# Patient Record
Sex: MALE | Race: WHITE | URBAN - METROPOLITAN AREA
[De-identification: names, ages, dates, MRNs, and addresses within clinical notes are randomized per-mention and may not be internally consistent; named-entity substitution may affect disease eponyms.]

---

## 2017-08-07 ENCOUNTER — OUTPATIENT (OUTPATIENT)
Dept: OUTPATIENT SERVICES | Facility: HOSPITAL | Age: 67
LOS: 1 days | End: 2017-08-07

## 2017-12-09 ENCOUNTER — OUTPATIENT (OUTPATIENT)
Dept: OUTPATIENT SERVICES | Facility: HOSPITAL | Age: 67
LOS: 1 days | End: 2017-12-09

## 2017-12-09 ENCOUNTER — INPATIENT (INPATIENT)
Facility: HOSPITAL | Age: 67
LOS: 2 days | Discharge: NURSING, REHAB OR LONG-TERM CARE | End: 2017-12-12
Admitting: SURGERY
Payer: MEDICARE

## 2017-12-09 PROCEDURE — 99285 EMERGENCY DEPT VISIT HI MDM: CPT

## 2017-12-09 PROCEDURE — 73562 X-RAY EXAM OF KNEE 3: CPT | Mod: 26,RT

## 2017-12-09 PROCEDURE — 73552 X-RAY EXAM OF FEMUR 2/>: CPT | Mod: 26,RT

## 2017-12-09 PROCEDURE — 71260 CT THORAX DX C+: CPT | Mod: 26

## 2017-12-09 PROCEDURE — 93880 EXTRACRANIAL BILAT STUDY: CPT | Mod: 26

## 2017-12-09 PROCEDURE — 74177 CT ABD & PELVIS W/CONTRAST: CPT | Mod: 26

## 2017-12-09 PROCEDURE — 72125 CT NECK SPINE W/O DYE: CPT | Mod: 26

## 2017-12-09 PROCEDURE — 70450 CT HEAD/BRAIN W/O DYE: CPT | Mod: 26

## 2017-12-09 PROCEDURE — 73502 X-RAY EXAM HIP UNI 2-3 VIEWS: CPT | Mod: 26,RT

## 2017-12-10 ENCOUNTER — OUTPATIENT (OUTPATIENT)
Dept: OUTPATIENT SERVICES | Facility: HOSPITAL | Age: 67
LOS: 1 days | End: 2017-12-10

## 2017-12-10 PROCEDURE — 73502 X-RAY EXAM HIP UNI 2-3 VIEWS: CPT | Mod: 26,RT

## 2017-12-11 ENCOUNTER — OUTPATIENT (OUTPATIENT)
Dept: OUTPATIENT SERVICES | Facility: HOSPITAL | Age: 67
LOS: 1 days | End: 2017-12-11

## 2017-12-11 PROCEDURE — 71010: CPT | Mod: 26

## 2017-12-12 ENCOUNTER — OUTPATIENT (OUTPATIENT)
Dept: OUTPATIENT SERVICES | Facility: HOSPITAL | Age: 67
LOS: 1 days | End: 2017-12-12

## 2017-12-12 ENCOUNTER — INPATIENT (INPATIENT)
Facility: HOSPITAL | Age: 67
LOS: 52 days | Discharge: HOME | End: 2018-02-03
Payer: COMMERCIAL

## 2018-01-02 PROCEDURE — 73502 X-RAY EXAM HIP UNI 2-3 VIEWS: CPT | Mod: 26,RT

## 2023-02-15 ENCOUNTER — APPOINTMENT (OUTPATIENT)
Dept: RADIOLOGY | Facility: CLINIC | Age: 73
End: 2023-02-15
Payer: MEDICARE

## 2023-02-15 PROCEDURE — 71046 X-RAY EXAM CHEST 2 VIEWS: CPT

## 2025-01-20 ENCOUNTER — APPOINTMENT (OUTPATIENT)
Dept: CT IMAGING | Facility: CLINIC | Age: 75
End: 2025-01-20
Payer: MEDICARE

## 2025-01-20 PROCEDURE — 74176 CT ABD & PELVIS W/O CONTRAST: CPT

## 2025-04-14 ENCOUNTER — TRANSCRIPTION ENCOUNTER (OUTPATIENT)
Age: 75
End: 2025-04-14

## 2025-04-25 ENCOUNTER — TRANSCRIPTION ENCOUNTER (OUTPATIENT)
Age: 75
End: 2025-04-25

## 2025-04-25 ENCOUNTER — INPATIENT (INPATIENT)
Facility: HOSPITAL | Age: 75
LOS: 2 days | Discharge: HOME | DRG: 696 | End: 2025-04-28
Attending: STUDENT IN AN ORGANIZED HEALTH CARE EDUCATION/TRAINING PROGRAM | Admitting: STUDENT IN AN ORGANIZED HEALTH CARE EDUCATION/TRAINING PROGRAM
Payer: MEDICARE

## 2025-04-25 VITALS
SYSTOLIC BLOOD PRESSURE: 171 MMHG | RESPIRATION RATE: 18 BRPM | TEMPERATURE: 97 F | HEART RATE: 88 BPM | DIASTOLIC BLOOD PRESSURE: 101 MMHG | OXYGEN SATURATION: 98 %

## 2025-04-25 DIAGNOSIS — R31.9 HEMATURIA, UNSPECIFIED: ICD-10-CM

## 2025-04-25 PROBLEM — Z00.00 ENCOUNTER FOR PREVENTIVE HEALTH EXAMINATION: Status: ACTIVE | Noted: 2025-04-25

## 2025-04-25 LAB
GLUCOSE BLDC GLUCOMTR-MCNC: 130 MG/DL — HIGH (ref 70–99)
GLUCOSE BLDC GLUCOMTR-MCNC: 133 MG/DL — HIGH (ref 70–99)
GLUCOSE BLDC GLUCOMTR-MCNC: 138 MG/DL — HIGH (ref 70–99)

## 2025-04-25 PROCEDURE — 52001 CYSTO W/IRRG&EVAC MLT CLOTS: CPT | Mod: 59

## 2025-04-25 PROCEDURE — 99235 HOSP IP/OBS SAME DATE MOD 70: CPT | Mod: FS,25

## 2025-04-25 PROCEDURE — 99222 1ST HOSP IP/OBS MODERATE 55: CPT

## 2025-04-25 PROCEDURE — 52214 CYSTOSCOPY AND TREATMENT: CPT

## 2025-04-25 DEVICE — GUIDEWIRE AMPLATZ SUPER-STIFF .035" X 145CM 7CM BENTSON-TYPE: Type: IMPLANTABLE DEVICE | Status: FUNCTIONAL

## 2025-04-25 RX ORDER — PSYLLIUM SEED (WITH DEXTROSE)
1 POWDER (GRAM) ORAL AT BEDTIME
Refills: 0 | Status: DISCONTINUED | OUTPATIENT
Start: 2025-04-25 | End: 2025-04-28

## 2025-04-25 RX ORDER — ONDANSETRON HCL/PF 4 MG/2 ML
4 VIAL (ML) INJECTION ONCE
Refills: 0 | Status: DISCONTINUED | OUTPATIENT
Start: 2025-04-25 | End: 2025-04-26

## 2025-04-25 RX ORDER — ATORVASTATIN CALCIUM 80 MG/1
20 TABLET, FILM COATED ORAL AT BEDTIME
Refills: 0 | Status: DISCONTINUED | OUTPATIENT
Start: 2025-04-25 | End: 2025-04-26

## 2025-04-25 RX ORDER — CARVEDILOL 3.12 MG/1
25 TABLET, FILM COATED ORAL EVERY 12 HOURS
Refills: 0 | Status: DISCONTINUED | OUTPATIENT
Start: 2025-04-25 | End: 2025-04-26

## 2025-04-25 RX ORDER — FENTANYL CITRATE-0.9 % NACL/PF 100MCG/2ML
25 SYRINGE (ML) INTRAVENOUS
Refills: 0 | Status: DISCONTINUED | OUTPATIENT
Start: 2025-04-25 | End: 2025-04-26

## 2025-04-25 RX ORDER — FINASTERIDE 1 MG/1
5 TABLET, FILM COATED ORAL DAILY
Refills: 0 | Status: DISCONTINUED | OUTPATIENT
Start: 2025-04-25 | End: 2025-04-28

## 2025-04-25 RX ORDER — METFORMIN HYDROCHLORIDE 850 MG/1
500 TABLET ORAL DAILY
Refills: 0 | Status: DISCONTINUED | OUTPATIENT
Start: 2025-04-25 | End: 2025-04-26

## 2025-04-25 RX ORDER — LISINOPRIL 5 MG/1
10 TABLET ORAL AT BEDTIME
Refills: 0 | Status: DISCONTINUED | OUTPATIENT
Start: 2025-04-25 | End: 2025-04-26

## 2025-04-25 RX ORDER — HYDROMORPHONE/SOD CHLOR,ISO/PF 2 MG/10 ML
0.5 SYRINGE (ML) INJECTION
Refills: 0 | Status: DISCONTINUED | OUTPATIENT
Start: 2025-04-25 | End: 2025-04-26

## 2025-04-25 RX ORDER — LEVOTHYROXINE SODIUM 300 MCG
50 TABLET ORAL DAILY
Refills: 0 | Status: DISCONTINUED | OUTPATIENT
Start: 2025-04-25 | End: 2025-04-28

## 2025-04-25 RX ADMIN — Medication 2.5 MILLIGRAM(S): at 23:23

## 2025-04-25 RX ADMIN — Medication 2.5 MILLIGRAM(S): at 22:30

## 2025-04-25 RX ADMIN — CARVEDILOL 25 MILLIGRAM(S): 3.12 TABLET, FILM COATED ORAL at 23:53

## 2025-04-25 RX ADMIN — LISINOPRIL 10 MILLIGRAM(S): 5 TABLET ORAL at 23:53

## 2025-04-25 NOTE — H&P ADULT - ASSESSMENT
Patient is a 72 yo male with extensive medical history transferred from [facility] for emergent clot evacuation due to clot retention.     Plan:   - NPO/IVF  - OR ASAP for clot evacuation   - medicine adm post-op

## 2025-04-25 NOTE — H&P ADULT - NSICDXPASTMEDICALHX_GEN_ALL_CORE_FT
PAST MEDICAL HISTORY:  CAD (coronary artery disease)     CVA (cerebrovascular accident)     DM (diabetes mellitus)     HLD (hyperlipidemia)     HTN (hypertension)

## 2025-04-25 NOTE — BRIEF OPERATIVE NOTE - OPERATION/FINDINGS
Active bleeding from prostate and bladder cauterized. Clot evacuated. 20F Home catheter placed w 60ml in balloon. CBI clear on traction.

## 2025-04-26 LAB
ACETONE SERPL-MCNC: ABNORMAL
ALBUMIN SERPL ELPH-MCNC: 3 G/DL — LOW (ref 3.3–5.2)
ALBUMIN SERPL ELPH-MCNC: 3.1 G/DL — LOW (ref 3.3–5.2)
ALP SERPL-CCNC: 85 U/L — SIGNIFICANT CHANGE UP (ref 40–120)
ALT FLD-CCNC: 7 U/L — SIGNIFICANT CHANGE UP
ANION GAP SERPL CALC-SCNC: 21 MMOL/L — HIGH (ref 5–17)
ANION GAP SERPL CALC-SCNC: 21 MMOL/L — HIGH (ref 5–17)
ANION GAP SERPL CALC-SCNC: 23 MMOL/L — HIGH (ref 5–17)
APPEARANCE UR: ABNORMAL
AST SERPL-CCNC: 8 U/L — SIGNIFICANT CHANGE UP
B-OH-BUTYR SERPL-SCNC: 5.1 MMOL/L — HIGH
BACTERIA # UR AUTO: ABNORMAL /HPF
BASOPHILS # BLD AUTO: 0.01 K/UL — SIGNIFICANT CHANGE UP (ref 0–0.2)
BASOPHILS NFR BLD AUTO: 0.1 % — SIGNIFICANT CHANGE UP (ref 0–2)
BILIRUB SERPL-MCNC: 0.2 MG/DL — LOW (ref 0.4–2)
BILIRUB UR-MCNC: NEGATIVE — SIGNIFICANT CHANGE UP
BUN SERPL-MCNC: 21.6 MG/DL — HIGH (ref 8–20)
BUN SERPL-MCNC: 24.8 MG/DL — HIGH (ref 8–20)
BUN SERPL-MCNC: 32.3 MG/DL — HIGH (ref 8–20)
CALCIUM SERPL-MCNC: 8.3 MG/DL — LOW (ref 8.4–10.5)
CALCIUM SERPL-MCNC: 8.5 MG/DL — SIGNIFICANT CHANGE UP (ref 8.4–10.5)
CALCIUM SERPL-MCNC: 8.6 MG/DL — SIGNIFICANT CHANGE UP (ref 8.4–10.5)
CAST: 4 /LPF — SIGNIFICANT CHANGE UP (ref 0–4)
CHLORIDE SERPL-SCNC: 101 MMOL/L — SIGNIFICANT CHANGE UP (ref 96–108)
CHLORIDE SERPL-SCNC: 101 MMOL/L — SIGNIFICANT CHANGE UP (ref 96–108)
CHLORIDE SERPL-SCNC: 99 MMOL/L — SIGNIFICANT CHANGE UP (ref 96–108)
CO2 SERPL-SCNC: 15 MMOL/L — LOW (ref 22–29)
CO2 SERPL-SCNC: 16 MMOL/L — LOW (ref 22–29)
CO2 SERPL-SCNC: 16 MMOL/L — LOW (ref 22–29)
COLOR SPEC: YELLOW — SIGNIFICANT CHANGE UP
CREAT SERPL-MCNC: 1 MG/DL — SIGNIFICANT CHANGE UP (ref 0.5–1.3)
CREAT SERPL-MCNC: 1.04 MG/DL — SIGNIFICANT CHANGE UP (ref 0.5–1.3)
CREAT SERPL-MCNC: 1.26 MG/DL — SIGNIFICANT CHANGE UP (ref 0.5–1.3)
DIFF PNL FLD: ABNORMAL
EGFR: 59 ML/MIN/1.73M2 — LOW
EGFR: 59 ML/MIN/1.73M2 — LOW
EGFR: 75 ML/MIN/1.73M2 — SIGNIFICANT CHANGE UP
EGFR: 75 ML/MIN/1.73M2 — SIGNIFICANT CHANGE UP
EGFR: 78 ML/MIN/1.73M2 — SIGNIFICANT CHANGE UP
EGFR: 78 ML/MIN/1.73M2 — SIGNIFICANT CHANGE UP
EOSINOPHIL # BLD AUTO: 0 K/UL — SIGNIFICANT CHANGE UP (ref 0–0.5)
EOSINOPHIL NFR BLD AUTO: 0 % — SIGNIFICANT CHANGE UP (ref 0–6)
GAS PNL BLDA: SIGNIFICANT CHANGE UP
GLUCOSE BLDC GLUCOMTR-MCNC: 196 MG/DL — HIGH (ref 70–99)
GLUCOSE BLDC GLUCOMTR-MCNC: 200 MG/DL — HIGH (ref 70–99)
GLUCOSE BLDC GLUCOMTR-MCNC: 240 MG/DL — HIGH (ref 70–99)
GLUCOSE BLDC GLUCOMTR-MCNC: 288 MG/DL — HIGH (ref 70–99)
GLUCOSE BLDC GLUCOMTR-MCNC: 299 MG/DL — HIGH (ref 70–99)
GLUCOSE BLDC GLUCOMTR-MCNC: 302 MG/DL — HIGH (ref 70–99)
GLUCOSE SERPL-MCNC: 196 MG/DL — HIGH (ref 70–99)
GLUCOSE SERPL-MCNC: 211 MG/DL — HIGH (ref 70–99)
GLUCOSE SERPL-MCNC: 299 MG/DL — HIGH (ref 70–99)
GLUCOSE UR QL: >=1000 MG/DL
HCT VFR BLD CALC: 41.2 % — SIGNIFICANT CHANGE UP (ref 39–50)
HGB BLD-MCNC: 12.9 G/DL — LOW (ref 13–17)
IMM GRANULOCYTES # BLD AUTO: 0.07 K/UL — SIGNIFICANT CHANGE UP (ref 0–0.07)
IMM GRANULOCYTES NFR BLD AUTO: 0.5 % — SIGNIFICANT CHANGE UP (ref 0–0.9)
KETONES UR-MCNC: 15 MG/DL
LACTATE SERPL-SCNC: 1.3 MMOL/L — SIGNIFICANT CHANGE UP (ref 0.5–2)
LEUKOCYTE ESTERASE UR-ACNC: ABNORMAL
LYMPHOCYTES # BLD AUTO: 0.83 K/UL — LOW (ref 1–3.3)
LYMPHOCYTES NFR BLD AUTO: 5.8 % — LOW (ref 13–44)
MAGNESIUM SERPL-MCNC: 1.9 MG/DL — SIGNIFICANT CHANGE UP (ref 1.6–2.6)
MCHC RBC-ENTMCNC: 29.7 PG — SIGNIFICANT CHANGE UP (ref 27–34)
MCHC RBC-ENTMCNC: 31.3 G/DL — LOW (ref 32–36)
MCV RBC AUTO: 94.9 FL — SIGNIFICANT CHANGE UP (ref 80–100)
MONOCYTES # BLD AUTO: 0.22 K/UL — SIGNIFICANT CHANGE UP (ref 0–0.9)
MONOCYTES NFR BLD AUTO: 1.5 % — LOW (ref 2–14)
MRSA PCR RESULT.: SIGNIFICANT CHANGE UP
NEUTROPHILS # BLD AUTO: 13.2 K/UL — HIGH (ref 1.8–7.4)
NEUTROPHILS NFR BLD AUTO: 92.1 % — HIGH (ref 43–77)
NITRITE UR-MCNC: NEGATIVE — SIGNIFICANT CHANGE UP
NRBC # BLD AUTO: 0 K/UL — SIGNIFICANT CHANGE UP (ref 0–0)
NRBC # FLD: 0 K/UL — SIGNIFICANT CHANGE UP (ref 0–0)
NRBC BLD AUTO-RTO: 0 /100 WBCS — SIGNIFICANT CHANGE UP (ref 0–0)
PH UR: 5 — SIGNIFICANT CHANGE UP (ref 5–8)
PHOSPHATE SERPL-MCNC: 4.8 MG/DL — HIGH (ref 2.4–4.7)
PLATELET # BLD AUTO: 286 K/UL — SIGNIFICANT CHANGE UP (ref 150–400)
PMV BLD: 11.1 FL — SIGNIFICANT CHANGE UP (ref 7–13)
POTASSIUM SERPL-MCNC: 4.7 MMOL/L — SIGNIFICANT CHANGE UP (ref 3.5–5.3)
POTASSIUM SERPL-MCNC: 4.9 MMOL/L — SIGNIFICANT CHANGE UP (ref 3.5–5.3)
POTASSIUM SERPL-MCNC: 4.9 MMOL/L — SIGNIFICANT CHANGE UP (ref 3.5–5.3)
POTASSIUM SERPL-SCNC: 4.7 MMOL/L — SIGNIFICANT CHANGE UP (ref 3.5–5.3)
POTASSIUM SERPL-SCNC: 4.9 MMOL/L — SIGNIFICANT CHANGE UP (ref 3.5–5.3)
POTASSIUM SERPL-SCNC: 4.9 MMOL/L — SIGNIFICANT CHANGE UP (ref 3.5–5.3)
PROT SERPL-MCNC: 5.9 G/DL — LOW (ref 6.6–8.7)
PROT UR-MCNC: 30 MG/DL
RBC # BLD: 4.34 M/UL — SIGNIFICANT CHANGE UP (ref 4.2–5.8)
RBC # FLD: 13.7 % — SIGNIFICANT CHANGE UP (ref 10.3–14.5)
RBC CASTS # UR COMP ASSIST: 338 /HPF — HIGH (ref 0–4)
S AUREUS DNA NOSE QL NAA+PROBE: SIGNIFICANT CHANGE UP
SODIUM SERPL-SCNC: 136 MMOL/L — SIGNIFICANT CHANGE UP (ref 135–145)
SODIUM SERPL-SCNC: 138 MMOL/L — SIGNIFICANT CHANGE UP (ref 135–145)
SODIUM SERPL-SCNC: 138 MMOL/L — SIGNIFICANT CHANGE UP (ref 135–145)
SP GR SPEC: 1.01 — SIGNIFICANT CHANGE UP (ref 1–1.03)
SQUAMOUS # UR AUTO: 1 /HPF — SIGNIFICANT CHANGE UP (ref 0–5)
UROBILINOGEN FLD QL: 0.2 MG/DL — SIGNIFICANT CHANGE UP (ref 0.2–1)
WBC # BLD: 14.33 K/UL — HIGH (ref 3.8–10.5)
WBC # FLD AUTO: 14.33 K/UL — HIGH (ref 3.8–10.5)
WBC UR QL: 594 /HPF — HIGH (ref 0–5)

## 2025-04-26 PROCEDURE — 71045 X-RAY EXAM CHEST 1 VIEW: CPT | Mod: 26

## 2025-04-26 PROCEDURE — 99233 SBSQ HOSP IP/OBS HIGH 50: CPT | Mod: FS

## 2025-04-26 RX ORDER — SODIUM CHLORIDE 9 G/1000ML
1000 INJECTION, SOLUTION INTRAVENOUS
Refills: 0 | Status: DISCONTINUED | OUTPATIENT
Start: 2025-04-26 | End: 2025-04-28

## 2025-04-26 RX ORDER — INSULIN LISPRO 100 U/ML
INJECTION, SOLUTION INTRAVENOUS; SUBCUTANEOUS AT BEDTIME
Refills: 0 | Status: DISCONTINUED | OUTPATIENT
Start: 2025-04-26 | End: 2025-04-26

## 2025-04-26 RX ORDER — CARVEDILOL 3.12 MG/1
25 TABLET, FILM COATED ORAL EVERY 12 HOURS
Refills: 0 | Status: DISCONTINUED | OUTPATIENT
Start: 2025-04-26 | End: 2025-04-28

## 2025-04-26 RX ORDER — ATORVASTATIN CALCIUM 80 MG/1
20 TABLET, FILM COATED ORAL AT BEDTIME
Refills: 0 | Status: DISCONTINUED | OUTPATIENT
Start: 2025-04-26 | End: 2025-04-28

## 2025-04-26 RX ORDER — MAGNESIUM SULFATE 500 MG/ML
1 SYRINGE (ML) INJECTION ONCE
Refills: 0 | Status: COMPLETED | OUTPATIENT
Start: 2025-04-26 | End: 2025-04-26

## 2025-04-26 RX ORDER — INSULIN LISPRO 100 U/ML
INJECTION, SOLUTION INTRAVENOUS; SUBCUTANEOUS AT BEDTIME
Refills: 0 | Status: DISCONTINUED | OUTPATIENT
Start: 2025-04-26 | End: 2025-04-28

## 2025-04-26 RX ORDER — LISINOPRIL 5 MG/1
1 TABLET ORAL
Refills: 0 | DISCHARGE

## 2025-04-26 RX ORDER — LISINOPRIL 5 MG/1
5 TABLET ORAL DAILY
Refills: 0 | Status: DISCONTINUED | OUTPATIENT
Start: 2025-04-26 | End: 2025-04-28

## 2025-04-26 RX ORDER — INSULIN LISPRO 100 U/ML
INJECTION, SOLUTION INTRAVENOUS; SUBCUTANEOUS
Refills: 0 | Status: DISCONTINUED | OUTPATIENT
Start: 2025-04-26 | End: 2025-04-26

## 2025-04-26 RX ORDER — INSULIN GLARGINE-YFGN 100 [IU]/ML
40 INJECTION, SOLUTION SUBCUTANEOUS AT BEDTIME
Refills: 0 | Status: DISCONTINUED | OUTPATIENT
Start: 2025-04-26 | End: 2025-04-27

## 2025-04-26 RX ORDER — GLUCAGON 3 MG/1
1 POWDER NASAL ONCE
Refills: 0 | Status: DISCONTINUED | OUTPATIENT
Start: 2025-04-26 | End: 2025-04-28

## 2025-04-26 RX ORDER — OXYBUTYNIN CHLORIDE 5 MG/1
5 TABLET, FILM COATED, EXTENDED RELEASE ORAL ONCE
Refills: 0 | Status: COMPLETED | OUTPATIENT
Start: 2025-04-26 | End: 2025-04-26

## 2025-04-26 RX ORDER — ACETAMINOPHEN 500 MG/5ML
1000 LIQUID (ML) ORAL ONCE
Refills: 0 | Status: DISCONTINUED | OUTPATIENT
Start: 2025-04-26 | End: 2025-04-28

## 2025-04-26 RX ORDER — INSULIN LISPRO 100 U/ML
8 INJECTION, SOLUTION INTRAVENOUS; SUBCUTANEOUS
Refills: 0 | Status: DISCONTINUED | OUTPATIENT
Start: 2025-04-26 | End: 2025-04-27

## 2025-04-26 RX ORDER — ACETAMINOPHEN 500 MG/5ML
975 LIQUID (ML) ORAL EVERY 6 HOURS
Refills: 0 | Status: DISCONTINUED | OUTPATIENT
Start: 2025-04-26 | End: 2025-04-28

## 2025-04-26 RX ORDER — DEXTROSE 50 % IN WATER 50 %
25 SYRINGE (ML) INTRAVENOUS ONCE
Refills: 0 | Status: DISCONTINUED | OUTPATIENT
Start: 2025-04-26 | End: 2025-04-28

## 2025-04-26 RX ORDER — INSULIN GLARGINE-YFGN 100 [IU]/ML
22 INJECTION, SOLUTION SUBCUTANEOUS AT BEDTIME
Refills: 0 | Status: DISCONTINUED | OUTPATIENT
Start: 2025-04-26 | End: 2025-04-26

## 2025-04-26 RX ORDER — DEXTROSE 50 % IN WATER 50 %
15 SYRINGE (ML) INTRAVENOUS ONCE
Refills: 0 | Status: DISCONTINUED | OUTPATIENT
Start: 2025-04-26 | End: 2025-04-28

## 2025-04-26 RX ORDER — INSULIN LISPRO 100 U/ML
INJECTION, SOLUTION INTRAVENOUS; SUBCUTANEOUS
Refills: 0 | Status: DISCONTINUED | OUTPATIENT
Start: 2025-04-26 | End: 2025-04-28

## 2025-04-26 RX ORDER — DEXTROSE 50 % IN WATER 50 %
12.5 SYRINGE (ML) INTRAVENOUS ONCE
Refills: 0 | Status: DISCONTINUED | OUTPATIENT
Start: 2025-04-26 | End: 2025-04-28

## 2025-04-26 RX ADMIN — INSULIN LISPRO 2: 100 INJECTION, SOLUTION INTRAVENOUS; SUBCUTANEOUS at 23:03

## 2025-04-26 RX ADMIN — INSULIN LISPRO 8 UNIT(S): 100 INJECTION, SOLUTION INTRAVENOUS; SUBCUTANEOUS at 12:50

## 2025-04-26 RX ADMIN — Medication 975 MILLIGRAM(S): at 06:57

## 2025-04-26 RX ADMIN — Medication 100 GRAM(S): at 13:06

## 2025-04-26 RX ADMIN — CARVEDILOL 25 MILLIGRAM(S): 3.12 TABLET, FILM COATED ORAL at 17:38

## 2025-04-26 RX ADMIN — Medication 975 MILLIGRAM(S): at 17:37

## 2025-04-26 RX ADMIN — Medication 50 MICROGRAM(S): at 06:57

## 2025-04-26 RX ADMIN — Medication 975 MILLIGRAM(S): at 11:27

## 2025-04-26 RX ADMIN — ATORVASTATIN CALCIUM 20 MILLIGRAM(S): 80 TABLET, FILM COATED ORAL at 11:46

## 2025-04-26 RX ADMIN — Medication 1 APPLICATION(S): at 17:52

## 2025-04-26 RX ADMIN — OXYBUTYNIN CHLORIDE 5 MILLIGRAM(S): 5 TABLET, FILM COATED, EXTENDED RELEASE ORAL at 11:27

## 2025-04-26 RX ADMIN — Medication 1 PACKET(S): at 23:01

## 2025-04-26 RX ADMIN — Medication 975 MILLIGRAM(S): at 17:51

## 2025-04-26 RX ADMIN — INSULIN LISPRO 3: 100 INJECTION, SOLUTION INTRAVENOUS; SUBCUTANEOUS at 11:28

## 2025-04-26 RX ADMIN — INSULIN GLARGINE-YFGN 40 UNIT(S): 100 INJECTION, SOLUTION SUBCUTANEOUS at 23:07

## 2025-04-26 RX ADMIN — FINASTERIDE 5 MILLIGRAM(S): 1 TABLET, FILM COATED ORAL at 11:27

## 2025-04-26 RX ADMIN — INSULIN LISPRO 8: 100 INJECTION, SOLUTION INTRAVENOUS; SUBCUTANEOUS at 16:17

## 2025-04-26 RX ADMIN — Medication 100 MILLIGRAM(S): at 23:02

## 2025-04-26 RX ADMIN — INSULIN LISPRO 8 UNIT(S): 100 INJECTION, SOLUTION INTRAVENOUS; SUBCUTANEOUS at 16:17

## 2025-04-26 RX ADMIN — Medication 975 MILLIGRAM(S): at 23:08

## 2025-04-26 RX ADMIN — Medication 100 MILLILITER(S): at 17:53

## 2025-04-26 NOTE — PROGRESS NOTE ADULT - SUBJECTIVE AND OBJECTIVE BOX
Subjective: patient evaluated and examined at the bedside. patient reports intermittent penile pain and suprapubic pain described as spasms as CBI is running. He is also experiencing some bleeding at penile meatus that has since stopped when evaluated at bedside. patient denies any additional acute complaints     STATUS POST:  cystoscopy + clot evacuation     POST OPERATIVE DAY #: 1    MEDICATIONS  (STANDING):  acetaminophen     Tablet .. 975 milliGRAM(s) Oral every 6 hours  acetaminophen   IVPB .. 1000 milliGRAM(s) IV Intermittent once  allopurinol 100 milliGRAM(s) Oral at bedtime  atorvastatin 20 milliGRAM(s) Oral at bedtime  carvedilol 25 milliGRAM(s) Oral every 12 hours  chlorhexidine 2% Cloths 1 Application(s) Topical daily  dextrose 5%. 1000 milliLiter(s) (100 mL/Hr) IV Continuous <Continuous>  dextrose 5%. 1000 milliLiter(s) (50 mL/Hr) IV Continuous <Continuous>  dextrose 50% Injectable 25 Gram(s) IV Push once  dextrose 50% Injectable 12.5 Gram(s) IV Push once  dextrose 50% Injectable 25 Gram(s) IV Push once  finasteride 5 milliGRAM(s) Oral daily  glucagon  Injectable 1 milliGRAM(s) IntraMuscular once  insulin glargine Injectable (LANTUS) 40 Unit(s) SubCutaneous at bedtime  insulin lispro (ADMELOG) corrective regimen sliding scale   SubCutaneous three times a day before meals  insulin lispro (ADMELOG) corrective regimen sliding scale   SubCutaneous at bedtime  insulin lispro Injectable (ADMELOG) 8 Unit(s) SubCutaneous three times a day before meals  levothyroxine 50 MICROGram(s) Oral daily  lisinopril 5 milliGRAM(s) Oral daily  magnesium sulfate  IVPB 1 Gram(s) IV Intermittent once  psyllium Powder 1 Packet(s) Oral at bedtime  sodium chloride 0.9% Bolus 500 milliLiter(s) IV Bolus once  sodium chloride 0.9%. 1000 milliLiter(s) (100 mL/Hr) IV Continuous <Continuous>    MEDICATIONS  (PRN):  dextrose Oral Gel 15 Gram(s) Oral once PRN Blood Glucose LESS THAN 70 milliGRAM(s)/deciliter      Vital Signs Last 24 Hrs  T(C): 36.9 (26 Apr 2025 08:00), Max: 36.9 (26 Apr 2025 08:00)  T(F): 98.4 (26 Apr 2025 08:00), Max: 98.4 (26 Apr 2025 08:00)  HR: 84 (26 Apr 2025 08:00) (77 - 99)  BP: 120/73 (26 Apr 2025 08:00) (105/70 - 184/96)  BP(mean): --  RR: 18 (26 Apr 2025 08:00) (16 - 22)  SpO2: 96% (26 Apr 2025 08:00) (94% - 98%)    Parameters below as of 26 Apr 2025 08:00  Patient On (Oxygen Delivery Method): room air        Physical Exam:    Constitutional: resting in bed, family member at bedside, awake and alert   Respiratory: Respirations non-labored, no accessory muscle use  Gastrointestinal: Soft, non-tender, non-distended  : stout draining clear, light pink urine with CBI running at moderate rate; removed off traction     LABS:                        12.9   14.33 )-----------( 286      ( 26 Apr 2025 05:50 )             41.2     04-26    138  |  101  |  24.8[H]  ----------------------------<  211[H]  4.7   |  15.0[L]  |  1.04    Ca    8.5      26 Apr 2025 08:34  Phos  4.8     04-26  Mg     1.9     04-26    TPro  x   /  Alb  3.1[L]  /  TBili  x   /  DBili  x   /  AST  x   /  ALT  x   /  AlkPhos  x   04-26      Urinalysis Basic - ( 26 Apr 2025 08:34 )    Color: x / Appearance: x / SG: x / pH: x  Gluc: 211 mg/dL / Ketone: x  / Bili: x / Urobili: x   Blood: x / Protein: x / Nitrite: x   Leuk Esterase: x / RBC: x / WBC x   Sq Epi: x / Non Sq Epi: x / Bacteria: x    A: 74M POD#1 s/p cystoscopy with clot evacuation. Remains HD stable and afebrile. AM labs remarkable for diabetic ketoacidosis. Medicine team following, will manage conservatively at this time and reassess - per medicine and discussion w/ endocrinology no acute indication for insulin gtt at this time.     Plan:   - dvt ppx: SCDs, holding chem dvt ppx at this time ISO hematuria  - diet: consistent carbohydrate   - f/u am labs, replete lytes prn  - will monitor DKA and reassess if require insulin gtt however not indicated at this time   - consult endo  - oxybutynin prn for bladder spasms  - MM pain control prn   - will f/u xray final read for PICC IV access   - titrate CBI  - trend H/H   - dispo planning

## 2025-04-26 NOTE — CHART NOTE - NSCHARTNOTEFT_GEN_A_CORE
Patient with uncontrolled glucose, endo consult called and spoke with  who will forward consult to covering attending for DKA. Medicine following for glucose control, will monitor closely and escalate intervention if needed

## 2025-04-26 NOTE — CHART NOTE - NSCHARTNOTEFT_GEN_A_CORE
Patient resting comfortably in NARD on RA. HR 89 SPO2 99% on RA. Patient currently not on their own CPAP. Patients wife is at the bedside. Patient's wife states patient wears his CPAP every night. Patient will wear CPAP mask tonight as per patients wife.

## 2025-04-26 NOTE — PATIENT PROFILE ADULT - FUNCTIONAL ASSESSMENT - BASIC MOBILITY 6.
2-calculated by average/Not able to assess (calculate score using [facility] averaging method)

## 2025-04-26 NOTE — CONSULT NOTE ADULT - ASSESSMENT
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 Now status post cystoscopy.    #s/p clot evacuation via cystoscopy  Being managed by Urology    #Cystitis  CTAP at [facility] showing cystitis  Ceftriaxone for now  order Ua and Ucx    #DM2  Hold home DM2 medications  ISS and POC glucose      #Hx of CVA  c/w carvedilol 25 mg BID  c/w statin  c/w allopurinol  hold plavix for now given clots    #Hypothyroidism  c/w home levothyroxine  75 yo M PMHx CVA residual R sided hemiplegia and aphasia, left carotid stenting, DM2, HTN, HLD, h/o DVT presenting with hematuria and clots. Now status post cystoscopy.    #s/p clot evacuation via cystoscopy  Being managed by Urology    #[place]is?  [place] [facility]  ordered Ua and Ucx    #DM2  Pt is in Glargine 44 units nightly, Linagliptin 5 mg daily, glipizide 5 mg BID, Jardiance 25 mg daily, metformin 500 mg daily  Hold home DM2 medications  ISS and POC glucose      #Hx of CVA residual R sided hemeplegia and aphasia  #HTN  c/w carvedilol 25 mg BID  c/w simvastatin 40 mg daily  hold plavix 75 mg daily for now  c/w lisinopril 5 mg daily    #Hypothyroidism  c/w home levothyroxine 50 mcg daily    DVT ppx: SCDs     73 yo M PMHx CVA residual R sided hemiplegia and aphasia, left carotid stenting, DM2, HTN, HLD, h/o DVT presenting with hematuria and clots. Now status post cystoscopy.    #s/p clot evacuation via cystoscopy  Being managed by Urology    #Cystitis?  CTAP at [facility] showing cystitis  ordered Ua and Ucx    #DM2  Pt is in Glargine 44 units nightly, Linagliptin 5 mg daily, glipizide 5 mg BID, Jardiance 25 mg daily, metformin 500 mg daily  Hold home DM2 medications  ISS and POC glucose  Restart glargine at half dose for now and can increased if pt tolerating PO      #Hx of CVA residual R sided hemeplegia and aphasia  #HTN  c/w carvedilol 25 mg BID  c/w simvastatin 40 mg daily  hold plavix 75 mg daily for now  c/w lisinopril 5 mg daily    #Hypothyroidism  c/w home levothyroxine 50 mcg daily    DVT ppx: SCDs

## 2025-04-26 NOTE — CHART NOTE - NSCHARTNOTEFT_GEN_A_CORE
Labs noted concern for euglycemic DKA, patient takes Jardiance and per wife he last took it on Thursday.  Patient with high Anion gap metabolic acidosis, positive acetone in blood and Ketones in urine.  ABG still pending and beta hydroxy butyrate still pending.   Patient requires insulin drip.   Urology team notified. Will increase IVFs. Labs noted concern for  DKA, patient takes Jardiance and per wife he last took it on Thursday.  Patient with high Anion gap metabolic acidosis, positive acetone in blood and Ketones in urine.  ABG still pending and beta hydroxy butyrate still pending.   Patient requires insulin drip.   Urology team notified. Will increase IVFs. Labs noted concern for  DKA, patient takes Jardiance and per wife he last took it on Thursday.  Patient with high Anion gap metabolic acidosis, positive acetone in blood and Ketones in urine.  ABG still pending and beta hydroxy butyrate still pending.   Urology team notified. Will increase IVFs. Labs noted concern for mild DKA, patient takes Jardiance and per wife he last took it on Thursday.  Patient with high Anion gap metabolic acidosis, positive acetone in blood and Ketones in urine.  ABG reviewed.   Discussed with Torie and ok to manage on the floor.   Stat 1L NS blus  start premeal 8 units  increase Lantus to 40 units  change ISS to moderate scale.   a1c in am  Endo to see tomorrow.  Urology team notified. Labs noted concern for mild DKA, patient takes Jardiance and per wife he last took it on Thursday.  Patient with high Anion gap metabolic acidosis, positive acetone in blood and Ketones in urine.  ABG reviewed.   Discussed with Torie and ok to manage on the floor.   Stat 500cc NS bolus  increse ivfs to 100cc per hr  start premeal 8 units  increase Lantus to 40 units  change ISS to moderate scale.   a1c in am  Endo to see tomorrow.  Urology team notified.  also patient has a midline not a picc line,

## 2025-04-26 NOTE — PATIENT PROFILE ADULT - FALL HARM RISK - CONCLUSION
Pt seen here last afternoon dx with colitis.   Pt is vomiting unable to hold down any fluids, abd pain worse, fabienne lt side
Patient presenting for progressive weakness and acute weakness that precedes falls. Patient endorses poorer PO intake, weight loss, constipation. Appears dehydrated on labs and PE. Hypokalemia to 2.7. Has history of depression and endorses worsening mood affect. Mild psychomotor retardation. Weakness likely 2/2 to poor PO intake i/s/o worsening depression. UA with blood, no RBCs, indicating muscle breakdown.    - f/u CK   - PT evaluation   - hold BP medications, as normotensive on presentation   - avoid sedating agents, hold tramadol, tylenol PRN escalate as needed  - restart psychiatric medications  - replete electrolytes   - encourage PO fluid intake  - f/u TSH, free T4
Fall with Harm Risk

## 2025-04-26 NOTE — CONSULT NOTE ADULT - SUBJECTIVE AND OBJECTIVE BOX
Male w/ PMH of CVA w/ residual right sided hemiplegia and aphasia, bilateral carotid stenting, DM2, HTN, HLD, h/o DVT who initially presented to with [facility] for hematuria. Pt apparently underwent a cystoscopy with urology 11 days prior and on 4/23 began presenting with penile bleeding with passage of large clots. Pt was transferred to [facility] for clot evacuation after pt underwent an attempted cystoscopy for clot evacuation at [facility] which failed d/t a false passage. Pt was transferred emergently to [facility] and pt underwent cystoscopy.   Male w/ PMH of CVA w/ residual right sided hemiplegia and aphasia, bilateral carotid stenting, DM2, HTN, HLD, h/o DVT who initially presented to with [facility] for hematuria. Pt apparently underwent a cystoscopy with urology 11 days prior and on 4/23 began presenting with penile bleeding with passage of large clots. Pt was transferred to [facility] for clot evacuation after pt underwent an attempted cystoscopy for clot evacuation at [facility] which failed d/t a false passage. Pt was transferred emergently to [facility] and pt underwent cystoscopy.     Pt's wife who was at bedside was angry about the consultation and concerned that pt was continuously being woken up. I was unable to assess pt at bedside as a result.         REVIEW OF SYSTEMS:  CONSTITUTIONAL: No fever, chills  HEENT:  No blurry vision No sinus or throat pain  NECK: No pain or stiffness  RESPIRATORY: No cough, wheezing, chills or hemoptysis; No shortness of breath  CARDIOVASCULAR: No chest pain, palpitations  GASTROINTESTINAL: No abdominal pain. No nausea, vomiting, or diarrhea  GENITOURINARY: No dysuria  NEUROLOGICAL: No HA, No focal weakness  SKIN: No itching, burning, rashes, or lesions   MUSCULOSKELETAL: No joint pain or swelling; No muscle, back, or extremity pain    MEDICATIONS:  acetaminophen     Tablet .. 975 milliGRAM(s) Oral every 6 hours  acetaminophen   IVPB .. 1000 milliGRAM(s) IV Intermittent once  allopurinol 100 milliGRAM(s) Oral at bedtime  atorvastatin 20 milliGRAM(s) Oral at bedtime  carvedilol 25 milliGRAM(s) Oral every 12 hours  dextrose 5%. 1000 milliLiter(s) IV Continuous <Continuous>  dextrose 5%. 1000 milliLiter(s) IV Continuous <Continuous>  dextrose 50% Injectable 25 Gram(s) IV Push once  dextrose 50% Injectable 12.5 Gram(s) IV Push once  dextrose 50% Injectable 25 Gram(s) IV Push once  dextrose Oral Gel 15 Gram(s) Oral once PRN  finasteride 5 milliGRAM(s) Oral daily  glucagon  Injectable 1 milliGRAM(s) IntraMuscular once  insulin lispro (ADMELOG) corrective regimen sliding scale   SubCutaneous three times a day before meals  insulin lispro (ADMELOG) corrective regimen sliding scale   SubCutaneous at bedtime  levothyroxine 50 MICROGram(s) Oral daily  lisinopril 10 milliGRAM(s) Oral at bedtime  psyllium Powder 1 Packet(s) Oral at bedtime      T(C): 36.1 (04-25-25 @ 22:10), Max: 36.1 (04-25-25 @ 22:10)  HR: 88 (04-26-25 @ 00:30) (87 - 99)  BP: 150/97 (04-26-25 @ 00:30) (150/97 - 184/96)  RR: 19 (04-26-25 @ 00:30) (16 - 22)  SpO2: 95% (04-26-25 @ 00:30) (95% - 98%)  Wt(kg): --Vital Signs Last 24 Hrs  T(C): 36.1 (25 Apr 2025 22:10), Max: 36.1 (25 Apr 2025 22:10)  T(F): 97 (25 Apr 2025 22:10), Max: 97 (25 Apr 2025 22:10)  HR: 88 (26 Apr 2025 00:30) (87 - 99)  BP: 150/97 (26 Apr 2025 00:30) (150/97 - 184/96)  BP(mean): --  RR: 19 (26 Apr 2025 00:30) (16 - 22)  SpO2: 95% (26 Apr 2025 00:30) (95% - 98%)    Parameters below as of 26 Apr 2025 00:30  Patient On (Oxygen Delivery Method): room air        PHYSICAL EXAM:  Unable to assess    Consultant(s) Notes Reviewed:  [x ] YES  [ ] NO  Care Discussed with Consultants/Other Providers [ x] YES  [ ] NO    LABS:              CAPILLARY BLOOD GLUCOSE      POCT Blood Glucose.: 133 mg/dL (25 Apr 2025 22:05)  POCT Blood Glucose.: 130 mg/dL (25 Apr 2025 20:47)  POCT Blood Glucose.: 138 mg/dL (25 Apr 2025 19:46)            RADIOLOGY & ADDITIONAL TESTS:    Imaging Personally Reviewed:  [x ] YES  [ ] NO    Male w/ PMH of CVA w/ residual right sided hemiplegia and aphasia, bilateral carotid stenting, DM2, HTN, HLD, h/o DVT who initially presented to with [facility] for hematuria. Pt apparently underwent a cystoscopy with urology 11 days prior and on 4/23 began presenting with penile bleeding with passage of large clots. Pt was transferred to [facility] for clot evacuation after pt underwent an attempted cystoscopy for clot evacuation at [facility] which failed d/t a false passage. Pt was transferred emergently to [facility] and pt underwent cystoscopy.     Pt's wife who was at bedside was angry about the consultation and concerned that pt was continuously being woken up. I was unable to assess pt at bedside as a result.         REVIEW OF SYSTEMS:  Unable to assess    MEDICATIONS:  acetaminophen     Tablet .. 975 milliGRAM(s) Oral every 6 hours  acetaminophen   IVPB .. 1000 milliGRAM(s) IV Intermittent once  allopurinol 100 milliGRAM(s) Oral at bedtime  atorvastatin 20 milliGRAM(s) Oral at bedtime  carvedilol 25 milliGRAM(s) Oral every 12 hours  dextrose 5%. 1000 milliLiter(s) IV Continuous <Continuous>  dextrose 5%. 1000 milliLiter(s) IV Continuous <Continuous>  dextrose 50% Injectable 25 Gram(s) IV Push once  dextrose 50% Injectable 12.5 Gram(s) IV Push once  dextrose 50% Injectable 25 Gram(s) IV Push once  dextrose Oral Gel 15 Gram(s) Oral once PRN  finasteride 5 milliGRAM(s) Oral daily  glucagon  Injectable 1 milliGRAM(s) IntraMuscular once  insulin lispro (ADMELOG) corrective regimen sliding scale   SubCutaneous three times a day before meals  insulin lispro (ADMELOG) corrective regimen sliding scale   SubCutaneous at bedtime  levothyroxine 50 MICROGram(s) Oral daily  lisinopril 10 milliGRAM(s) Oral at bedtime  psyllium Powder 1 Packet(s) Oral at bedtime      T(C): 36.1 (04-25-25 @ 22:10), Max: 36.1 (04-25-25 @ 22:10)  HR: 88 (04-26-25 @ 00:30) (87 - 99)  BP: 150/97 (04-26-25 @ 00:30) (150/97 - 184/96)  RR: 19 (04-26-25 @ 00:30) (16 - 22)  SpO2: 95% (04-26-25 @ 00:30) (95% - 98%)  Wt(kg): --Vital Signs Last 24 Hrs  T(C): 36.1 (25 Apr 2025 22:10), Max: 36.1 (25 Apr 2025 22:10)  T(F): 97 (25 Apr 2025 22:10), Max: 97 (25 Apr 2025 22:10)  HR: 88 (26 Apr 2025 00:30) (87 - 99)  BP: 150/97 (26 Apr 2025 00:30) (150/97 - 184/96)  BP(mean): --  RR: 19 (26 Apr 2025 00:30) (16 - 22)  SpO2: 95% (26 Apr 2025 00:30) (95% - 98%)    Parameters below as of 26 Apr 2025 00:30  Patient On (Oxygen Delivery Method): room air        PHYSICAL EXAM:  Unable to assess    Consultant(s) Notes Reviewed:  [x ] YES  [ ] NO  Care Discussed with Consultants/Other Providers [ x] YES  [ ] NO    LABS:              CAPILLARY BLOOD GLUCOSE      POCT Blood Glucose.: 133 mg/dL (25 Apr 2025 22:05)  POCT Blood Glucose.: 130 mg/dL (25 Apr 2025 20:47)  POCT Blood Glucose.: 138 mg/dL (25 Apr 2025 19:46)            RADIOLOGY & ADDITIONAL TESTS:    Imaging Personally Reviewed:  [x ] YES  [ ] NO

## 2025-04-27 LAB
A1C WITH ESTIMATED AVERAGE GLUCOSE RESULT: 7.3 % — HIGH (ref 4–5.6)
ANION GAP SERPL CALC-SCNC: 11 MMOL/L — SIGNIFICANT CHANGE UP (ref 5–17)
ANION GAP SERPL CALC-SCNC: 12 MMOL/L — SIGNIFICANT CHANGE UP (ref 5–17)
BUN SERPL-MCNC: 32.8 MG/DL — HIGH (ref 8–20)
BUN SERPL-MCNC: 41.3 MG/DL — HIGH (ref 8–20)
CALCIUM SERPL-MCNC: 8.1 MG/DL — LOW (ref 8.4–10.5)
CALCIUM SERPL-MCNC: 8.2 MG/DL — LOW (ref 8.4–10.5)
CHLORIDE SERPL-SCNC: 105 MMOL/L — SIGNIFICANT CHANGE UP (ref 96–108)
CHLORIDE SERPL-SCNC: 106 MMOL/L — SIGNIFICANT CHANGE UP (ref 96–108)
CO2 SERPL-SCNC: 21 MMOL/L — LOW (ref 22–29)
CO2 SERPL-SCNC: 22 MMOL/L — SIGNIFICANT CHANGE UP (ref 22–29)
CREAT SERPL-MCNC: 1.02 MG/DL — SIGNIFICANT CHANGE UP (ref 0.5–1.3)
CREAT SERPL-MCNC: 1.24 MG/DL — SIGNIFICANT CHANGE UP (ref 0.5–1.3)
EGFR: 61 ML/MIN/1.73M2 — SIGNIFICANT CHANGE UP
EGFR: 61 ML/MIN/1.73M2 — SIGNIFICANT CHANGE UP
EGFR: 77 ML/MIN/1.73M2 — SIGNIFICANT CHANGE UP
EGFR: 77 ML/MIN/1.73M2 — SIGNIFICANT CHANGE UP
ESTIMATED AVERAGE GLUCOSE: 163 MG/DL — HIGH (ref 68–114)
GLUCOSE BLDC GLUCOMTR-MCNC: 153 MG/DL — HIGH (ref 70–99)
GLUCOSE BLDC GLUCOMTR-MCNC: 180 MG/DL — HIGH (ref 70–99)
GLUCOSE BLDC GLUCOMTR-MCNC: 220 MG/DL — HIGH (ref 70–99)
GLUCOSE BLDC GLUCOMTR-MCNC: 231 MG/DL — HIGH (ref 70–99)
GLUCOSE SERPL-MCNC: 247 MG/DL — HIGH (ref 70–99)
GLUCOSE SERPL-MCNC: 272 MG/DL — HIGH (ref 70–99)
HCT VFR BLD CALC: 34.8 % — LOW (ref 39–50)
HGB BLD-MCNC: 11.1 G/DL — LOW (ref 13–17)
MAGNESIUM SERPL-MCNC: 2.1 MG/DL — SIGNIFICANT CHANGE UP (ref 1.6–2.6)
MCHC RBC-ENTMCNC: 29.8 PG — SIGNIFICANT CHANGE UP (ref 27–34)
MCHC RBC-ENTMCNC: 31.9 G/DL — LOW (ref 32–36)
MCV RBC AUTO: 93.3 FL — SIGNIFICANT CHANGE UP (ref 80–100)
NRBC # BLD AUTO: 0 K/UL — SIGNIFICANT CHANGE UP (ref 0–0)
NRBC # FLD: 0 K/UL — SIGNIFICANT CHANGE UP (ref 0–0)
NRBC BLD AUTO-RTO: 0 /100 WBCS — SIGNIFICANT CHANGE UP (ref 0–0)
PHOSPHATE SERPL-MCNC: 3.7 MG/DL — SIGNIFICANT CHANGE UP (ref 2.4–4.7)
PLATELET # BLD AUTO: 305 K/UL — SIGNIFICANT CHANGE UP (ref 150–400)
PMV BLD: 10.9 FL — SIGNIFICANT CHANGE UP (ref 7–13)
POTASSIUM SERPL-MCNC: 4.1 MMOL/L — SIGNIFICANT CHANGE UP (ref 3.5–5.3)
POTASSIUM SERPL-MCNC: 4.5 MMOL/L — SIGNIFICANT CHANGE UP (ref 3.5–5.3)
POTASSIUM SERPL-SCNC: 4.1 MMOL/L — SIGNIFICANT CHANGE UP (ref 3.5–5.3)
POTASSIUM SERPL-SCNC: 4.5 MMOL/L — SIGNIFICANT CHANGE UP (ref 3.5–5.3)
RBC # BLD: 3.73 M/UL — LOW (ref 4.2–5.8)
RBC # FLD: 14.2 % — SIGNIFICANT CHANGE UP (ref 10.3–14.5)
SODIUM SERPL-SCNC: 138 MMOL/L — SIGNIFICANT CHANGE UP (ref 135–145)
SODIUM SERPL-SCNC: 139 MMOL/L — SIGNIFICANT CHANGE UP (ref 135–145)
WBC # BLD: 15.22 K/UL — HIGH (ref 3.8–10.5)
WBC # FLD AUTO: 15.22 K/UL — HIGH (ref 3.8–10.5)

## 2025-04-27 PROCEDURE — 99223 1ST HOSP IP/OBS HIGH 75: CPT

## 2025-04-27 PROCEDURE — 99232 SBSQ HOSP IP/OBS MODERATE 35: CPT

## 2025-04-27 PROCEDURE — 99233 SBSQ HOSP IP/OBS HIGH 50: CPT | Mod: FS

## 2025-04-27 RX ORDER — OXYBUTYNIN CHLORIDE 5 MG/1
5 TABLET, FILM COATED, EXTENDED RELEASE ORAL EVERY 8 HOURS
Refills: 0 | Status: DISCONTINUED | OUTPATIENT
Start: 2025-04-27 | End: 2025-04-28

## 2025-04-27 RX ORDER — POLYETHYLENE GLYCOL 3350 17 G/17G
17 POWDER, FOR SOLUTION ORAL DAILY
Refills: 0 | Status: DISCONTINUED | OUTPATIENT
Start: 2025-04-27 | End: 2025-04-28

## 2025-04-27 RX ORDER — INSULIN GLARGINE-YFGN 100 [IU]/ML
44 INJECTION, SOLUTION SUBCUTANEOUS AT BEDTIME
Refills: 0 | Status: DISCONTINUED | OUTPATIENT
Start: 2025-04-27 | End: 2025-04-28

## 2025-04-27 RX ORDER — SENNA 187 MG
2 TABLET ORAL AT BEDTIME
Refills: 0 | Status: DISCONTINUED | OUTPATIENT
Start: 2025-04-27 | End: 2025-04-28

## 2025-04-27 RX ORDER — INSULIN LISPRO 100 U/ML
12 INJECTION, SOLUTION INTRAVENOUS; SUBCUTANEOUS
Refills: 0 | Status: DISCONTINUED | OUTPATIENT
Start: 2025-04-27 | End: 2025-04-28

## 2025-04-27 RX ADMIN — Medication 1 APPLICATION(S): at 13:33

## 2025-04-27 RX ADMIN — INSULIN GLARGINE-YFGN 44 UNIT(S): 100 INJECTION, SOLUTION SUBCUTANEOUS at 21:11

## 2025-04-27 RX ADMIN — Medication 975 MILLIGRAM(S): at 14:00

## 2025-04-27 RX ADMIN — CARVEDILOL 25 MILLIGRAM(S): 3.12 TABLET, FILM COATED ORAL at 17:38

## 2025-04-27 RX ADMIN — FINASTERIDE 5 MILLIGRAM(S): 1 TABLET, FILM COATED ORAL at 13:35

## 2025-04-27 RX ADMIN — LISINOPRIL 5 MILLIGRAM(S): 5 TABLET ORAL at 05:34

## 2025-04-27 RX ADMIN — INSULIN LISPRO 8 UNIT(S): 100 INJECTION, SOLUTION INTRAVENOUS; SUBCUTANEOUS at 09:02

## 2025-04-27 RX ADMIN — Medication 1 PACKET(S): at 21:09

## 2025-04-27 RX ADMIN — INSULIN LISPRO 12 UNIT(S): 100 INJECTION, SOLUTION INTRAVENOUS; SUBCUTANEOUS at 17:40

## 2025-04-27 RX ADMIN — INSULIN LISPRO 4: 100 INJECTION, SOLUTION INTRAVENOUS; SUBCUTANEOUS at 09:02

## 2025-04-27 RX ADMIN — Medication 50 MICROGRAM(S): at 05:34

## 2025-04-27 RX ADMIN — OXYBUTYNIN CHLORIDE 5 MILLIGRAM(S): 5 TABLET, FILM COATED, EXTENDED RELEASE ORAL at 18:43

## 2025-04-27 RX ADMIN — Medication 2 TABLET(S): at 21:10

## 2025-04-27 RX ADMIN — ATORVASTATIN CALCIUM 20 MILLIGRAM(S): 80 TABLET, FILM COATED ORAL at 21:10

## 2025-04-27 RX ADMIN — INSULIN LISPRO 4: 100 INJECTION, SOLUTION INTRAVENOUS; SUBCUTANEOUS at 13:14

## 2025-04-27 RX ADMIN — CARVEDILOL 25 MILLIGRAM(S): 3.12 TABLET, FILM COATED ORAL at 05:34

## 2025-04-27 RX ADMIN — Medication 975 MILLIGRAM(S): at 05:33

## 2025-04-27 RX ADMIN — INSULIN LISPRO 12 UNIT(S): 100 INJECTION, SOLUTION INTRAVENOUS; SUBCUTANEOUS at 13:14

## 2025-04-27 RX ADMIN — Medication 100 MILLILITER(S): at 04:23

## 2025-04-27 RX ADMIN — Medication 975 MILLIGRAM(S): at 23:33

## 2025-04-27 RX ADMIN — INSULIN LISPRO 2: 100 INJECTION, SOLUTION INTRAVENOUS; SUBCUTANEOUS at 17:39

## 2025-04-27 RX ADMIN — Medication 975 MILLIGRAM(S): at 00:01

## 2025-04-27 RX ADMIN — Medication 975 MILLIGRAM(S): at 17:39

## 2025-04-27 RX ADMIN — Medication 975 MILLIGRAM(S): at 13:29

## 2025-04-27 RX ADMIN — Medication 100 MILLIGRAM(S): at 21:10

## 2025-04-27 NOTE — PROGRESS NOTE ADULT - SUBJECTIVE AND OBJECTIVE BOX
Subjective:      STATUS POST:      POST OPERATIVE DAY #:     MEDICATIONS  (STANDING):  acetaminophen     Tablet .. 975 milliGRAM(s) Oral every 6 hours  acetaminophen   IVPB .. 1000 milliGRAM(s) IV Intermittent once  allopurinol 100 milliGRAM(s) Oral at bedtime  atorvastatin 20 milliGRAM(s) Oral at bedtime  carvedilol 25 milliGRAM(s) Oral every 12 hours  chlorhexidine 2% Cloths 1 Application(s) Topical daily  dextrose 5%. 1000 milliLiter(s) (100 mL/Hr) IV Continuous <Continuous>  dextrose 5%. 1000 milliLiter(s) (50 mL/Hr) IV Continuous <Continuous>  dextrose 50% Injectable 25 Gram(s) IV Push once  dextrose 50% Injectable 12.5 Gram(s) IV Push once  dextrose 50% Injectable 25 Gram(s) IV Push once  finasteride 5 milliGRAM(s) Oral daily  glucagon  Injectable 1 milliGRAM(s) IntraMuscular once  insulin glargine Injectable (LANTUS) 40 Unit(s) SubCutaneous at bedtime  insulin lispro (ADMELOG) corrective regimen sliding scale   SubCutaneous three times a day before meals  insulin lispro (ADMELOG) corrective regimen sliding scale   SubCutaneous at bedtime  insulin lispro Injectable (ADMELOG) 8 Unit(s) SubCutaneous three times a day before meals  levothyroxine 50 MICROGram(s) Oral daily  lisinopril 5 milliGRAM(s) Oral daily  psyllium Powder 1 Packet(s) Oral at bedtime  sodium chloride 0.9% Bolus 500 milliLiter(s) IV Bolus once  sodium chloride 0.9%. 1000 milliLiter(s) (100 mL/Hr) IV Continuous <Continuous>    MEDICATIONS  (PRN):  dextrose Oral Gel 15 Gram(s) Oral once PRN Blood Glucose LESS THAN 70 milliGRAM(s)/deciliter      Vital Signs Last 24 Hrs  T(C): 36.6 (27 Apr 2025 04:43), Max: 36.9 (26 Apr 2025 08:00)  T(F): 97.8 (27 Apr 2025 04:43), Max: 98.4 (26 Apr 2025 08:00)  HR: 64 (27 Apr 2025 04:43) (64 - 100)  BP: 132/84 (27 Apr 2025 04:43) (99/60 - 144/83)  BP(mean): --  RR: 18 (27 Apr 2025 04:43) (16 - 18)  SpO2: 93% (27 Apr 2025 04:43) (93% - 98%)    Parameters below as of 27 Apr 2025 04:43  Patient On (Oxygen Delivery Method): BiPAP/CPAP        Physical Exam:    Constitutional: NAD  HEENT: PERRL, EOMI  Neck: No JVD, FROM without pain  Respiratory: Respirations non-labored, no accessory muscle use  Gastrointestinal: Soft, non-tender, non-distended  Extremities: No peripheral edema, No cyanosis  Neurological: A&O x 3; without gross deficit  Musculoskeletal: No joint pain, swelling, deformity, or point tenderness; no limitation of movement      LABS:                        11.1   15.22 )-----------( 305      ( 27 Apr 2025 05:20 )             34.8     04-26    136  |  99  |  32.3[H]  ----------------------------<  299[H]  4.9   |  16.0[L]  |  1.26    Ca    8.3[L]      26 Apr 2025 16:44  Phos  4.8     04-26  Mg     1.9     04-26    TPro  5.9[L]  /  Alb  3.0[L]  /  TBili  0.2[L]  /  DBili  x   /  AST  8   /  ALT  7   /  AlkPhos  85  04-26      Urinalysis Basic - ( 26 Apr 2025 16:44 )    Color: x / Appearance: x / SG: x / pH: x  Gluc: 299 mg/dL / Ketone: x  / Bili: x / Urobili: x   Blood: x / Protein: x / Nitrite: x   Leuk Esterase: x / RBC: x / WBC x   Sq Epi: x / Non Sq Epi: x / Bacteria: x        A:     Plan:   -   Subjective: patient evaluated and examined at the bedside. no overnight events. patient does not offer any complaints, hyperglycemia/DKA being closely managed by medicine service. Endocrinology consulted as well, awaiting documented recommendations     STATUS POST:  cysto + clot evacuation    POST OPERATIVE DAY #: 2    MEDICATIONS  (STANDING):  acetaminophen     Tablet .. 975 milliGRAM(s) Oral every 6 hours  acetaminophen   IVPB .. 1000 milliGRAM(s) IV Intermittent once  allopurinol 100 milliGRAM(s) Oral at bedtime  atorvastatin 20 milliGRAM(s) Oral at bedtime  carvedilol 25 milliGRAM(s) Oral every 12 hours  chlorhexidine 2% Cloths 1 Application(s) Topical daily  dextrose 5%. 1000 milliLiter(s) (100 mL/Hr) IV Continuous <Continuous>  dextrose 5%. 1000 milliLiter(s) (50 mL/Hr) IV Continuous <Continuous>  dextrose 50% Injectable 25 Gram(s) IV Push once  dextrose 50% Injectable 12.5 Gram(s) IV Push once  dextrose 50% Injectable 25 Gram(s) IV Push once  finasteride 5 milliGRAM(s) Oral daily  glucagon  Injectable 1 milliGRAM(s) IntraMuscular once  insulin glargine Injectable (LANTUS) 40 Unit(s) SubCutaneous at bedtime  insulin lispro (ADMELOG) corrective regimen sliding scale   SubCutaneous three times a day before meals  insulin lispro (ADMELOG) corrective regimen sliding scale   SubCutaneous at bedtime  insulin lispro Injectable (ADMELOG) 8 Unit(s) SubCutaneous three times a day before meals  levothyroxine 50 MICROGram(s) Oral daily  lisinopril 5 milliGRAM(s) Oral daily  psyllium Powder 1 Packet(s) Oral at bedtime  sodium chloride 0.9% Bolus 500 milliLiter(s) IV Bolus once  sodium chloride 0.9%. 1000 milliLiter(s) (100 mL/Hr) IV Continuous <Continuous>    MEDICATIONS  (PRN):  dextrose Oral Gel 15 Gram(s) Oral once PRN Blood Glucose LESS THAN 70 milliGRAM(s)/deciliter      Vital Signs Last 24 Hrs  T(C): 36.6 (27 Apr 2025 04:43), Max: 36.9 (26 Apr 2025 08:00)  T(F): 97.8 (27 Apr 2025 04:43), Max: 98.4 (26 Apr 2025 08:00)  HR: 64 (27 Apr 2025 04:43) (64 - 100)  BP: 132/84 (27 Apr 2025 04:43) (99/60 - 144/83)  BP(mean): --  RR: 18 (27 Apr 2025 04:43) (16 - 18)  SpO2: 93% (27 Apr 2025 04:43) (93% - 98%)    Parameters below as of 27 Apr 2025 04:43  Patient On (Oxygen Delivery Method): BiPAP/CPAP        Physical Exam:    Constitutional: NAD  Respiratory: Respirations non-labored, no accessory muscle use  Gastrointestinal: Soft, non-tender, non-distended  : stout draining light pink on CBI     LABS:                        11.1   15.22 )-----------( 305      ( 27 Apr 2025 05:20 )             34.8     04-26    136  |  99  |  32.3[H]  ----------------------------<  299[H]  4.9   |  16.0[L]  |  1.26    Ca    8.3[L]      26 Apr 2025 16:44  Phos  4.8     04-26  Mg     1.9     04-26    TPro  5.9[L]  /  Alb  3.0[L]  /  TBili  0.2[L]  /  DBili  x   /  AST  8   /  ALT  7   /  AlkPhos  85  04-26      Urinalysis Basic - ( 26 Apr 2025 16:44 )    Color: x / Appearance: x / SG: x / pH: x  Gluc: 299 mg/dL / Ketone: x  / Bili: x / Urobili: x   Blood: x / Protein: x / Nitrite: x   Leuk Esterase: x / RBC: x / WBC x   Sq Epi: x / Non Sq Epi: x / Bacteria: x    A:  74M POD#2 s/p cystoscopy with clot evacuation. Remains HD stable and afebrile. AM labs remarkable for diabetic ketoacidosis. Medicine team following and endocrinology consulted, will continue to manage conservatively at this time and reassess.    Plan:   - dvt ppx: SCDs, holding chem dvt ppx at this time ISO hematuria  - diet: consistent carbohydrate   - f/u am labs, replete lytes prn  - will monitor DKA and reassess   - f/u final endo recs   - oxybutynin prn for bladder spasms  - MM pain control prn   - midline IV access, confirmed patient transferred from [facility] [place] with midline rather than picc s/p CXRAY   - titrate CBI  - trend H/H   - dispo planning Subjective: patient evaluated and examined at the bedside. no overnight events. patient does not offer any complaints, hyperglycemia/DKA being closely managed by medicine service. Endocrinology consulted as well, awaiting documented recommendations     STATUS POST:  cysto + clot evacuation    POST OPERATIVE DAY #: 2    MEDICATIONS  (STANDING):  acetaminophen     Tablet .. 975 milliGRAM(s) Oral every 6 hours  acetaminophen   IVPB .. 1000 milliGRAM(s) IV Intermittent once  allopurinol 100 milliGRAM(s) Oral at bedtime  atorvastatin 20 milliGRAM(s) Oral at bedtime  carvedilol 25 milliGRAM(s) Oral every 12 hours  chlorhexidine 2% Cloths 1 Application(s) Topical daily  dextrose 5%. 1000 milliLiter(s) (100 mL/Hr) IV Continuous <Continuous>  dextrose 5%. 1000 milliLiter(s) (50 mL/Hr) IV Continuous <Continuous>  dextrose 50% Injectable 25 Gram(s) IV Push once  dextrose 50% Injectable 12.5 Gram(s) IV Push once  dextrose 50% Injectable 25 Gram(s) IV Push once  finasteride 5 milliGRAM(s) Oral daily  glucagon  Injectable 1 milliGRAM(s) IntraMuscular once  insulin glargine Injectable (LANTUS) 40 Unit(s) SubCutaneous at bedtime  insulin lispro (ADMELOG) corrective regimen sliding scale   SubCutaneous three times a day before meals  insulin lispro (ADMELOG) corrective regimen sliding scale   SubCutaneous at bedtime  insulin lispro Injectable (ADMELOG) 8 Unit(s) SubCutaneous three times a day before meals  levothyroxine 50 MICROGram(s) Oral daily  lisinopril 5 milliGRAM(s) Oral daily  psyllium Powder 1 Packet(s) Oral at bedtime  sodium chloride 0.9% Bolus 500 milliLiter(s) IV Bolus once  sodium chloride 0.9%. 1000 milliLiter(s) (100 mL/Hr) IV Continuous <Continuous>    MEDICATIONS  (PRN):  dextrose Oral Gel 15 Gram(s) Oral once PRN Blood Glucose LESS THAN 70 milliGRAM(s)/deciliter      Vital Signs Last 24 Hrs  T(C): 36.6 (27 Apr 2025 04:43), Max: 36.9 (26 Apr 2025 08:00)  T(F): 97.8 (27 Apr 2025 04:43), Max: 98.4 (26 Apr 2025 08:00)  HR: 64 (27 Apr 2025 04:43) (64 - 100)  BP: 132/84 (27 Apr 2025 04:43) (99/60 - 144/83)  BP(mean): --  RR: 18 (27 Apr 2025 04:43) (16 - 18)  SpO2: 93% (27 Apr 2025 04:43) (93% - 98%)    Parameters below as of 27 Apr 2025 04:43  Patient On (Oxygen Delivery Method): BiPAP/CPAP        Physical Exam:    Constitutional: NAD  Respiratory: Respirations non-labored, no accessory muscle use  Gastrointestinal: Soft, non-tender, non-distended  : stout draining yellow with CBI at very slow drip    LABS:                        11.1   15.22 )-----------( 305      ( 27 Apr 2025 05:20 )             34.8     04-26    136  |  99  |  32.3[H]  ----------------------------<  299[H]  4.9   |  16.0[L]  |  1.26    Ca    8.3[L]      26 Apr 2025 16:44  Phos  4.8     04-26  Mg     1.9     04-26    TPro  5.9[L]  /  Alb  3.0[L]  /  TBili  0.2[L]  /  DBili  x   /  AST  8   /  ALT  7   /  AlkPhos  85  04-26      Urinalysis Basic - ( 26 Apr 2025 16:44 )    Color: x / Appearance: x / SG: x / pH: x  Gluc: 299 mg/dL / Ketone: x  / Bili: x / Urobili: x   Blood: x / Protein: x / Nitrite: x   Leuk Esterase: x / RBC: x / WBC x   Sq Epi: x / Non Sq Epi: x / Bacteria: x    A:  74M POD#2 s/p cystoscopy with clot evacuation. Remains HD stable and afebrile. AM labs remarkable for diabetic ketoacidosis. Medicine team following and endocrinology consulted, will continue to manage conservatively at this time and reassess.    Plan:   - dvt ppx: SCDs, holding chem dvt ppx at this time ISO hematuria  - diet: consistent carbohydrate   - f/u am labs, replete lytes prn  - will monitor DKA and reassess   - appreciate endo recs   - oxybutynin prn for bladder spasms  - MM pain control prn   - midline IV access, [place] [facility] [place]   - titrate CBI, held at approx 10 am   - trend H/H   - dispo planning

## 2025-04-27 NOTE — CONSULT NOTE ADULT - SUBJECTIVE AND OBJECTIVE BOX
Patient is a 74y old  Male who presents with a chief complaint of blood clots requiring emergent cystoscopy (26 Apr 2025 03:18)    HPI:  71M obese with PMHx of CVA, CAD s/p PCI on plavix, HTN, HLD, T2DM, wheelchair bound due to CVA transferred from [place] for failed bladder clot evacuation post cystoscopy.  Consult for diabetes mgmt a1c 7.3        home meds:     PAST MEDICAL & SURGICAL HISTORY:  HTN (hypertension)    DM (diabetes mellitus)    HLD (hyperlipidemia)    CVA (cerebrovascular accident)    CAD (coronary artery disease)        Social History:      FAMILY HISTORY:        Allergies    No Known Allergies    Intolerances        ROS as noted in the HPI    MEDICATIONS  (STANDING):  acetaminophen     Tablet .. 975 milliGRAM(s) Oral every 6 hours  acetaminophen   IVPB .. 1000 milliGRAM(s) IV Intermittent once  allopurinol 100 milliGRAM(s) Oral at bedtime  atorvastatin 20 milliGRAM(s) Oral at bedtime  carvedilol 25 milliGRAM(s) Oral every 12 hours  chlorhexidine 2% Cloths 1 Application(s) Topical daily  dextrose 5%. 1000 milliLiter(s) (100 mL/Hr) IV Continuous <Continuous>  dextrose 5%. 1000 milliLiter(s) (50 mL/Hr) IV Continuous <Continuous>  dextrose 50% Injectable 25 Gram(s) IV Push once  dextrose 50% Injectable 12.5 Gram(s) IV Push once  dextrose 50% Injectable 25 Gram(s) IV Push once  finasteride 5 milliGRAM(s) Oral daily  glucagon  Injectable 1 milliGRAM(s) IntraMuscular once  insulin glargine Injectable (LANTUS) 40 Unit(s) SubCutaneous at bedtime  insulin lispro (ADMELOG) corrective regimen sliding scale   SubCutaneous three times a day before meals  insulin lispro (ADMELOG) corrective regimen sliding scale   SubCutaneous at bedtime  insulin lispro Injectable (ADMELOG) 8 Unit(s) SubCutaneous three times a day before meals  levothyroxine 50 MICROGram(s) Oral daily  lisinopril 5 milliGRAM(s) Oral daily  psyllium Powder 1 Packet(s) Oral at bedtime    MEDICATIONS  (PRN):  dextrose Oral Gel 15 Gram(s) Oral once PRN Blood Glucose LESS THAN 70 milliGRAM(s)/deciliter      Vital Signs Last 24 Hrs  T(C): 35.6 (27 Apr 2025 10:46), Max: 36.8 (26 Apr 2025 16:37)  T(F): 96 (27 Apr 2025 10:46), Max: 98.2 (26 Apr 2025 16:37)  HR: 64 (27 Apr 2025 10:46) (64 - 100)  BP: 121/72 (27 Apr 2025 10:46) (99/60 - 144/83)  BP(mean): --  RR: 18 (27 Apr 2025 10:46) (16 - 18)  SpO2: 97% (27 Apr 2025 10:46) (93% - 98%)    Parameters below as of 27 Apr 2025 10:46  Patient On (Oxygen Delivery Method): room air          Physical Exam:    Constitutional: NAD, well-developed  HEENT: EOMI, no exophalmos  Neck: trachea midline, no thyroid enlargement  Respiratory: CTAB, normal respirations  Cardiovascular: S1 and S2, RRR  Gastrointestinal: BS+, soft, ntnd  Extremities: No peripheral edema  Neurological: AOx3, no focal deficits  Psychiatric: Normal mood and normal affect  Skin: no rashes, no acanthosis    LABS  04-27    138  |  106  |  41.3[H]  ----------------------------<  272[H]  4.5   |  21.0[L]  |  1.24    Ca    8.1[L]      27 Apr 2025 05:20  Phos  3.7     04-27  Mg     2.1     04-27    TPro  5.9[L]  /  Alb  3.0[L]  /  TBili  0.2[L]  /  DBili  x   /  AST  8   /  ALT  7   /  AlkPhos  85  04-26                          11.1   15.22 )-----------( 305      ( 27 Apr 2025 05:20 )             34.8       A1C with Estimated Average Glucose Result: 7.3 % (04-27-25 @ 05:29)        Ketone - Urine: 15 mg/dL (04-26 @ 08:20)    Albumin: 3.0 g/dL (04-26-25 @ 16:44)  Aspartate Aminotransferase (AST/SGOT): 8 U/L (04-26-25 @ 16:44)  Alanine Aminotransferase (ALT/SGPT): 7 U/L (04-26-25 @ 16:44)  Alkaline Phosphatase: 85 U/L (04-26-25 @ 16:44)  Albumin: 3.1 g/dL (04-26-25 @ 08:34)          CAPILLARY BLOOD GLUCOSE      POCT Blood Glucose.: 220 mg/dL (27 Apr 2025 08:54)  POCT Blood Glucose.: 299 mg/dL (26 Apr 2025 22:23)  POCT Blood Glucose.: 302 mg/dL (26 Apr 2025 16:14)  POCT Blood Glucose.: 240 mg/dL (26 Apr 2025 12:48)  POCT Blood Glucose.: 288 mg/dL (26 Apr 2025 11:25)      Imaging     Patient is a 74y old  Male who presents with a chief complaint of blood clots requiring emergent cystoscopy (26 Apr 2025 03:18)    HPI:  71M obese, wheelchair bound with PMHx of CVA w/ residual R sided hemiplegia/aphasia, CAD s/p PCI on plavix, HTN, HLD, T2DM, hypothyroidism and hx of dvt [place] [facility] [place] with large clots after cystoscopy 4/16 and transferred emergently for bladder clot evacuation.  Consult for diabetes mgmt a1c 7.3        home meds: Glargine 44 units nightly, Linagliptin 5 mg daily, glipizide 5 mg BID, Jardiance 25 mg daily, metformin 500 mg daily, LT4 50mcg daily      PAST MEDICAL & SURGICAL HISTORY:  HTN (hypertension)    DM (diabetes mellitus)    HLD (hyperlipidemia)    CVA (cerebrovascular accident)    CAD (coronary artery disease)        Social History:  see above    FAMILY HISTORY:  see above      Allergies    No Known Allergies    Intolerances        ROS as noted in the HPI    MEDICATIONS  (STANDING):  acetaminophen     Tablet .. 975 milliGRAM(s) Oral every 6 hours  acetaminophen   IVPB .. 1000 milliGRAM(s) IV Intermittent once  allopurinol 100 milliGRAM(s) Oral at bedtime  atorvastatin 20 milliGRAM(s) Oral at bedtime  carvedilol 25 milliGRAM(s) Oral every 12 hours  chlorhexidine 2% Cloths 1 Application(s) Topical daily  dextrose 5%. 1000 milliLiter(s) (100 mL/Hr) IV Continuous <Continuous>  dextrose 5%. 1000 milliLiter(s) (50 mL/Hr) IV Continuous <Continuous>  dextrose 50% Injectable 25 Gram(s) IV Push once  dextrose 50% Injectable 12.5 Gram(s) IV Push once  dextrose 50% Injectable 25 Gram(s) IV Push once  finasteride 5 milliGRAM(s) Oral daily  glucagon  Injectable 1 milliGRAM(s) IntraMuscular once  insulin glargine Injectable (LANTUS) 40 Unit(s) SubCutaneous at bedtime  insulin lispro (ADMELOG) corrective regimen sliding scale   SubCutaneous three times a day before meals  insulin lispro (ADMELOG) corrective regimen sliding scale   SubCutaneous at bedtime  insulin lispro Injectable (ADMELOG) 8 Unit(s) SubCutaneous three times a day before meals  levothyroxine 50 MICROGram(s) Oral daily  lisinopril 5 milliGRAM(s) Oral daily  psyllium Powder 1 Packet(s) Oral at bedtime    MEDICATIONS  (PRN):  dextrose Oral Gel 15 Gram(s) Oral once PRN Blood Glucose LESS THAN 70 milliGRAM(s)/deciliter      Vital Signs Last 24 Hrs  T(C): 35.6 (27 Apr 2025 10:46), Max: 36.8 (26 Apr 2025 16:37)  T(F): 96 (27 Apr 2025 10:46), Max: 98.2 (26 Apr 2025 16:37)  HR: 64 (27 Apr 2025 10:46) (64 - 100)  BP: 121/72 (27 Apr 2025 10:46) (99/60 - 144/83)  BP(mean): --  RR: 18 (27 Apr 2025 10:46) (16 - 18)  SpO2: 97% (27 Apr 2025 10:46) (93% - 98%)    Parameters below as of 27 Apr 2025 10:46  Patient On (Oxygen Delivery Method): room air          Physical Exam:    Constitutional: NAD, well-developed, obese  Neck: trachea midline, no thyroid enlargement  Respiratory: CTAB, normal respirations  Cardiovascular: S1 and S2, RRR  Gastrointestinal: BS+, soft, ntnd  Extremities: No peripheral edema  Neurological: AOx3, R sided weakness  Psychiatric: Normal mood and normal affect  Skin: no rashes, no acanthosis    LABS  04-27    138  |  106  |  41.3[H]  ----------------------------<  272[H]  4.5   |  21.0[L]  |  1.24    Ca    8.1[L]      27 Apr 2025 05:20  Phos  3.7     04-27  Mg     2.1     04-27    TPro  5.9[L]  /  Alb  3.0[L]  /  TBili  0.2[L]  /  DBili  x   /  AST  8   /  ALT  7   /  AlkPhos  85  04-26                          11.1   15.22 )-----------( 305      ( 27 Apr 2025 05:20 )             34.8       A1C with Estimated Average Glucose Result: 7.3 % (04-27-25 @ 05:29)        Ketone - Urine: 15 mg/dL (04-26 @ 08:20)    Albumin: 3.0 g/dL (04-26-25 @ 16:44)  Aspartate Aminotransferase (AST/SGOT): 8 U/L (04-26-25 @ 16:44)  Alanine Aminotransferase (ALT/SGPT): 7 U/L (04-26-25 @ 16:44)  Alkaline Phosphatase: 85 U/L (04-26-25 @ 16:44)  Albumin: 3.1 g/dL (04-26-25 @ 08:34)          CAPILLARY BLOOD GLUCOSE      POCT Blood Glucose.: 220 mg/dL (27 Apr 2025 08:54)  POCT Blood Glucose.: 299 mg/dL (26 Apr 2025 22:23)  POCT Blood Glucose.: 302 mg/dL (26 Apr 2025 16:14)  POCT Blood Glucose.: 240 mg/dL (26 Apr 2025 12:48)  POCT Blood Glucose.: 288 mg/dL (26 Apr 2025 11:25)      Imaging     Patient is a 74y old  Male who presents with a chief complaint of blood clots requiring emergent cystoscopy (26 Apr 2025 03:18)    HPI:  71M obese, wheelchair bound with PMHx of CVA w/ residual R sided hemiplegia/aphasia, CAD s/p PCI on plavix, HTN, HLD, T2DM, hypothyroidism and hx of dvt [place] [facility] [place]oscopy 4/16 and transferred emergently for bladder clot evacuation.  Consult for diabetes mgmt a1c 7.3    wife at bedside  patient possibly being discharged home tomorrow    sees Dr. Adryan Noonan  diabetes for more than 30 yers  fhx of diabetes in father and uncle (both with T1DM)  lives with wife at home  home meds: Glargine 44 units nightly, Linagliptin 5 mg daily, glipizide 5 mg BID, Jardiance 25 mg daily, LT4 50mcg daily      PAST MEDICAL & SURGICAL HISTORY:  HTN (hypertension)    DM (diabetes mellitus)    HLD (hyperlipidemia)    CVA (cerebrovascular accident)    CAD (coronary artery disease)        Social History:  see above    FAMILY HISTORY:  see above      Allergies    No Known Allergies    Intolerances        ROS as noted in the HPI    MEDICATIONS  (STANDING):  acetaminophen     Tablet .. 975 milliGRAM(s) Oral every 6 hours  acetaminophen   IVPB .. 1000 milliGRAM(s) IV Intermittent once  allopurinol 100 milliGRAM(s) Oral at bedtime  atorvastatin 20 milliGRAM(s) Oral at bedtime  carvedilol 25 milliGRAM(s) Oral every 12 hours  chlorhexidine 2% Cloths 1 Application(s) Topical daily  dextrose 5%. 1000 milliLiter(s) (100 mL/Hr) IV Continuous <Continuous>  dextrose 5%. 1000 milliLiter(s) (50 mL/Hr) IV Continuous <Continuous>  dextrose 50% Injectable 25 Gram(s) IV Push once  dextrose 50% Injectable 12.5 Gram(s) IV Push once  dextrose 50% Injectable 25 Gram(s) IV Push once  finasteride 5 milliGRAM(s) Oral daily  glucagon  Injectable 1 milliGRAM(s) IntraMuscular once  insulin glargine Injectable (LANTUS) 40 Unit(s) SubCutaneous at bedtime  insulin lispro (ADMELOG) corrective regimen sliding scale   SubCutaneous three times a day before meals  insulin lispro (ADMELOG) corrective regimen sliding scale   SubCutaneous at bedtime  insulin lispro Injectable (ADMELOG) 8 Unit(s) SubCutaneous three times a day before meals  levothyroxine 50 MICROGram(s) Oral daily  lisinopril 5 milliGRAM(s) Oral daily  psyllium Powder 1 Packet(s) Oral at bedtime    MEDICATIONS  (PRN):  dextrose Oral Gel 15 Gram(s) Oral once PRN Blood Glucose LESS THAN 70 milliGRAM(s)/deciliter      Vital Signs Last 24 Hrs  T(C): 35.6 (27 Apr 2025 10:46), Max: 36.8 (26 Apr 2025 16:37)  T(F): 96 (27 Apr 2025 10:46), Max: 98.2 (26 Apr 2025 16:37)  HR: 64 (27 Apr 2025 10:46) (64 - 100)  BP: 121/72 (27 Apr 2025 10:46) (99/60 - 144/83)  BP(mean): --  RR: 18 (27 Apr 2025 10:46) (16 - 18)  SpO2: 97% (27 Apr 2025 10:46) (93% - 98%)    Parameters below as of 27 Apr 2025 10:46  Patient On (Oxygen Delivery Method): room air          Physical Exam:    Constitutional: NAD, elderly, obese  Respiratory: CTAB, normal respirations  Cardiovascular: S1 and S2, RRR  Gastrointestinal: BS+, soft, ntnd  Extremities: No peripheral edema  Neurological: AOx3, R sided weakness  Psychiatric: Normal mood and normal affect  Skin: no rashes, no acanthosis    LABS  04-27    138  |  106  |  41.3[H]  ----------------------------<  272[H]  4.5   |  21.0[L]  |  1.24    Ca    8.1[L]      27 Apr 2025 05:20  Phos  3.7     04-27  Mg     2.1     04-27    TPro  5.9[L]  /  Alb  3.0[L]  /  TBili  0.2[L]  /  DBili  x   /  AST  8   /  ALT  7   /  AlkPhos  85  04-26                          11.1   15.22 )-----------( 305      ( 27 Apr 2025 05:20 )             34.8       A1C with Estimated Average Glucose Result: 7.3 % (04-27-25 @ 05:29)        Ketone - Urine: 15 mg/dL (04-26 @ 08:20)    Albumin: 3.0 g/dL (04-26-25 @ 16:44)  Aspartate Aminotransferase (AST/SGOT): 8 U/L (04-26-25 @ 16:44)  Alanine Aminotransferase (ALT/SGPT): 7 U/L (04-26-25 @ 16:44)  Alkaline Phosphatase: 85 U/L (04-26-25 @ 16:44)  Albumin: 3.1 g/dL (04-26-25 @ 08:34)          CAPILLARY BLOOD GLUCOSE      POCT Blood Glucose.: 220 mg/dL (27 Apr 2025 08:54)  POCT Blood Glucose.: 299 mg/dL (26 Apr 2025 22:23)  POCT Blood Glucose.: 302 mg/dL (26 Apr 2025 16:14)  POCT Blood Glucose.: 240 mg/dL (26 Apr 2025 12:48)  POCT Blood Glucose.: 288 mg/dL (26 Apr 2025 11:25)      Imaging

## 2025-04-27 NOTE — PROGRESS NOTE ADULT - SUBJECTIVE AND OBJECTIVE BOX
[facility] Division of Hospital Medicine    Chief Complaint:  Hematuria, urinary retention, cystoscopy with clot evacuation    SUBJECTIVE / OVERNIGHT EVENTS: Patient examined at bedside without distress. No acute events overnight. Patient is tolerating diet, passing gas, but no BM. Endorses improvement in frequency of bladder spasm.    Patient denies chest pain, SOB, abd pain, N/V, fever, chills, dysuria or any other complaints. All remainder ROS negative.     MEDICATIONS  (STANDING):  acetaminophen     Tablet .. 975 milliGRAM(s) Oral every 6 hours  acetaminophen   IVPB .. 1000 milliGRAM(s) IV Intermittent once  allopurinol 100 milliGRAM(s) Oral at bedtime  atorvastatin 20 milliGRAM(s) Oral at bedtime  carvedilol 25 milliGRAM(s) Oral every 12 hours  chlorhexidine 2% Cloths 1 Application(s) Topical daily  dextrose 5%. 1000 milliLiter(s) (100 mL/Hr) IV Continuous <Continuous>  dextrose 5%. 1000 milliLiter(s) (50 mL/Hr) IV Continuous <Continuous>  dextrose 50% Injectable 25 Gram(s) IV Push once  dextrose 50% Injectable 12.5 Gram(s) IV Push once  dextrose 50% Injectable 25 Gram(s) IV Push once  finasteride 5 milliGRAM(s) Oral daily  glucagon  Injectable 1 milliGRAM(s) IntraMuscular once  insulin glargine Injectable (LANTUS) 40 Unit(s) SubCutaneous at bedtime  insulin lispro (ADMELOG) corrective regimen sliding scale   SubCutaneous three times a day before meals  insulin lispro (ADMELOG) corrective regimen sliding scale   SubCutaneous at bedtime  insulin lispro Injectable (ADMELOG) 8 Unit(s) SubCutaneous three times a day before meals  levothyroxine 50 MICROGram(s) Oral daily  lisinopril 5 milliGRAM(s) Oral daily  psyllium Powder 1 Packet(s) Oral at bedtime    MEDICATIONS  (PRN):  dextrose Oral Gel 15 Gram(s) Oral once PRN Blood Glucose LESS THAN 70 milliGRAM(s)/deciliter    I&O's Summary    PHYSICAL EXAM:  Vital Signs Last 24 Hrs  T(C): 35.6 (27 Apr 2025 10:46), Max: 36.8 (26 Apr 2025 16:37)  T(F): 96 (27 Apr 2025 10:46), Max: 98.2 (26 Apr 2025 16:37)  HR: 64 (27 Apr 2025 10:46) (64 - 100)  BP: 121/72 (27 Apr 2025 10:46) (99/60 - 144/83)  BP(mean): --  RR: 18 (27 Apr 2025 10:46) (16 - 18)  SpO2: 97% (27 Apr 2025 10:46) (93% - 98%)    Parameters below as of 27 Apr 2025 10:46  Patient On (Oxygen Delivery Method): room air      CONSTITUTIONAL: NAD, well-developed,  ENMT: Moist oral mucosa, no pharyngeal injection or exudates;   RESPIRATORY: Normal respiratory effort; lungs are clear to auscultation bilaterally  CARDIOVASCULAR: Regular rate and rhythm, normal S1 and S2, no murmu; No lower extremity edema; Peripheral pulses are 2+ bilaterally  ABDOMEN: Soft, Nontender to palpation, normoactive bowel sounds, no rebound/guarding; Morris in place  MUSCULOSKELETAL: No clubbing or cyanosis of digits; no joint swelling or tenderness to palpation. RUE chronic residual weakness  PSYCH: A+O to person, place, and time; affect appropriate  NEUROLOGY: CN 2-12 are intact and symmetric;  SKIN: No rashes; no palpable lesions    LABS:                        11.1   15.22 )-----------( 305      ( 27 Apr 2025 05:20 )             34.8     04-27    138  |  106  |  41.3[H]  ----------------------------<  272[H]  4.5   |  21.0[L]  |  1.24    Ca    8.1[L]      27 Apr 2025 05:20  Phos  3.7     04-27  Mg     2.1     04-27    TPro  5.9[L]  /  Alb  3.0[L]  /  TBili  0.2[L]  /  DBili  x   /  AST  8   /  ALT  7   /  AlkPhos  85  04-26      Urinalysis Basic - ( 27 Apr 2025 05:20 )    Color: x / Appearance: x / SG: x / pH: x  Gluc: 272 mg/dL / Ketone: x  / Bili: x / Urobili: x   Blood: x / Protein: x / Nitrite: x   Leuk Esterase: x / RBC: x / WBC x   Sq Epi: x / Non Sq Epi: x / Bacteria: x      CAPILLARY BLOOD GLUCOSE:  POCT Blood Glucose.: 220 mg/dL (27 Apr 2025 08:54)  POCT Blood Glucose.: 299 mg/dL (26 Apr 2025 22:23)  POCT Blood Glucose.: 302 mg/dL (26 Apr 2025 16:14)  POCT Blood Glucose.: 240 mg/dL (26 Apr 2025 12:48)  POCT Blood Glucose.: 288 mg/dL (26 Apr 2025 11:25)      RADIOLOGY & ADDITIONAL TESTS:    XR Chest:  IMPRESSION: PICC line not visualized. No radiographic evidence of active chest disease. [facility] Division of Hospital Medicine    Chief Complaint:  Hematuria, urinary retention, cystoscopy with clot evacuation    SUBJECTIVE / OVERNIGHT EVENTS: Patient examined at bedside without distress. No acute events overnight. Patient is tolerating diet, passing gas, but no BM. Endorses improvement in frequency of bladder spasm.    Patient denies chest pain, SOB, abd pain, N/V, fever, chills, dysuria or any other complaints. All remainder ROS negative.     MEDICATIONS  (STANDING):  acetaminophen     Tablet .. 975 milliGRAM(s) Oral every 6 hours  acetaminophen   IVPB .. 1000 milliGRAM(s) IV Intermittent once  allopurinol 100 milliGRAM(s) Oral at bedtime  atorvastatin 20 milliGRAM(s) Oral at bedtime  carvedilol 25 milliGRAM(s) Oral every 12 hours  chlorhexidine 2% Cloths 1 Application(s) Topical daily  dextrose 5%. 1000 milliLiter(s) (100 mL/Hr) IV Continuous <Continuous>  dextrose 5%. 1000 milliLiter(s) (50 mL/Hr) IV Continuous <Continuous>  dextrose 50% Injectable 25 Gram(s) IV Push once  dextrose 50% Injectable 12.5 Gram(s) IV Push once  dextrose 50% Injectable 25 Gram(s) IV Push once  finasteride 5 milliGRAM(s) Oral daily  glucagon  Injectable 1 milliGRAM(s) IntraMuscular once  insulin glargine Injectable (LANTUS) 40 Unit(s) SubCutaneous at bedtime  insulin lispro (ADMELOG) corrective regimen sliding scale   SubCutaneous three times a day before meals  insulin lispro (ADMELOG) corrective regimen sliding scale   SubCutaneous at bedtime  insulin lispro Injectable (ADMELOG) 8 Unit(s) SubCutaneous three times a day before meals  levothyroxine 50 MICROGram(s) Oral daily  lisinopril 5 milliGRAM(s) Oral daily  psyllium Powder 1 Packet(s) Oral at bedtime    MEDICATIONS  (PRN):  dextrose Oral Gel 15 Gram(s) Oral once PRN Blood Glucose LESS THAN 70 milliGRAM(s)/deciliter    I&O's Summary    PHYSICAL EXAM:  Vital Signs Last 24 Hrs  T(C): 35.6 (27 Apr 2025 10:46), Max: 36.8 (26 Apr 2025 16:37)  T(F): 96 (27 Apr 2025 10:46), Max: 98.2 (26 Apr 2025 16:37)  HR: 64 (27 Apr 2025 10:46) (64 - 100)  BP: 121/72 (27 Apr 2025 10:46) (99/60 - 144/83)  BP(mean): --  RR: 18 (27 Apr 2025 10:46) (16 - 18)  SpO2: 97% (27 Apr 2025 10:46) (93% - 98%)    Parameters below as of 27 Apr 2025 10:46  Patient On (Oxygen Delivery Method): room air      CONSTITUTIONAL: NAD, well-developed,  ENMT: Moist oral mucosa, no pharyngeal injection or exudates;   RESPIRATORY: Normal respiratory effort; lungs are clear to auscultation bilaterally  CARDIOVASCULAR: Regular rate and rhythm, normal S1 and S2, no murmur; No lower extremity edema; Peripheral pulses are 2+ bilaterally  ABDOMEN: Soft, Nontender to palpation, normoactive bowel sounds, no rebound/guarding; Morris in place  MUSCULOSKELETAL: No clubbing or cyanosis of digits; no joint swelling or tenderness to palpation. RUE chronic residual weakness  PSYCH: A+O to person, place, and time; affect appropriate  NEUROLOGY: CN 2-12 are intact and symmetric;  SKIN: No rashes; no palpable lesions    LABS:                        11.1   15.22 )-----------( 305      ( 27 Apr 2025 05:20 )             34.8     04-27    138  |  106  |  41.3[H]  ----------------------------<  272[H]  4.5   |  21.0[L]  |  1.24    Ca    8.1[L]      27 Apr 2025 05:20  Phos  3.7     04-27  Mg     2.1     04-27    TPro  5.9[L]  /  Alb  3.0[L]  /  TBili  0.2[L]  /  DBili  x   /  AST  8   /  ALT  7   /  AlkPhos  85  04-26      Urinalysis Basic - ( 27 Apr 2025 05:20 )    Color: x / Appearance: x / SG: x / pH: x  Gluc: 272 mg/dL / Ketone: x  / Bili: x / Urobili: x   Blood: x / Protein: x / Nitrite: x   Leuk Esterase: x / RBC: x / WBC x   Sq Epi: x / Non Sq Epi: x / Bacteria: x      CAPILLARY BLOOD GLUCOSE:  POCT Blood Glucose.: 220 mg/dL (27 Apr 2025 08:54)  POCT Blood Glucose.: 299 mg/dL (26 Apr 2025 22:23)  POCT Blood Glucose.: 302 mg/dL (26 Apr 2025 16:14)  POCT Blood Glucose.: 240 mg/dL (26 Apr 2025 12:48)  POCT Blood Glucose.: 288 mg/dL (26 Apr 2025 11:25)      RADIOLOGY & ADDITIONAL TESTS:    XR Chest:  IMPRESSION: PICC line not visualized. No radiographic evidence of active chest disease.

## 2025-04-27 NOTE — CHART NOTE - NSCHARTNOTEFT_GEN_A_CORE
Received pt on Home cpap machine. Pt tolerating well. Vitals are HR 60/ SpO2 96%. Will continue to monitor.

## 2025-04-27 NOTE — PROGRESS NOTE ADULT - ATTENDING COMMENTS
71M obese, wheelchair bound with PMHx of CVA w/ residual R sided hemiplegia/aphasia, CAD s/p PCI on plavix, HTN, HLD, T2DM, hypothyroidism and hx of [place] [facility] [place]. Post op patient developed mild DKA, AG has closed since started insult and fluid. Wife at bedside questions answered.     PHYSICAL EXAM:  Constitutional: No acute distress, alert and oriented by 3  HEENT: AT/NC, EOMI, PERRLA, Normal conjunctiva, no pharyngeal erythema, moist oral mucosa  Respiratory: CTA BL, equal breath sounds, no crackles or wheezing  Cardiovascular: RRR, no edema  Gastrointestinal: soft, Non-tender, Non-distended + Bowel sounds, no rebound or guarding  Genitourinary: + stout  Musculoskeletal: No joint edema  Neurological: CN 2-12 grossly intact, right sided weakness, + aphasia  Skin: warm, dry and intact  Psychiatric: normal mood and affect    Discussed with NP Student Ajay and Agree with above. Changes made to text.   Discussed with Urology team and Endocrinology.

## 2025-04-27 NOTE — CONSULT NOTE ADULT - ASSESSMENT
71M obese, wheelchair bound with PMHx of CVA w/ residual R sided hemiplegia/aphasia, CAD s/p PCI on plavix, HTN, HLD, T2DM, hypothyroidism and hx of dvt [place] to [facility] [place]uria with large clots after cystoscopy 4/16 and transferred emergently for bladder clot evacuation.  Consult for diabetes mgmt a1c 7.3    Uncontrolled T2DM- hyperglycemic  -A1c 7.3  -check sugars AC and bedtime  -ensure diabetic diet  -change to lantus 44 units QHS  -change to admelog 12 units TID  -continue with insulin sliding scale    Hematuria, bladder clot- s/p evacuation 4/25, care per urology    Hypothyroidism- check TSH, continue LT4     HLD- continue statin   71M obese, wheelchair bound with PMHx of CVA w/ residual R sided hemiplegia/aphasia, CAD s/p PCI on plavix, HTN, HLD, T2DM, hypothyroidism and hx of dvt presented to [facility] [place] for hematuria with large clots after cystoscopy 4/16 and transferred emergently for bladder clot evacuation.  Consult for diabetes mgmt a1c 7.3    Uncontrolled T2DM- hyperglycemic  -A1c 7.3  -check sugars AC and bedtime  -ensure diabetic diet  -change to lantus 44 units QHS  -change to admelog 12 units TID  -continue with insulin sliding scale  -patient can likely be discharged on home meds and follow up with his own endocrinologist    Hematuria, bladder clot- s/p evacuation 4/25, care per urology    Hypothyroidism- check TSH, continue LT4 50mcg daily    HLD- continue statin

## 2025-04-27 NOTE — PROGRESS NOTE ADULT - ASSESSMENT
73 y/o Male PMH: CVA (residual R sided hemiplegia, aphasia, wheelchair), CAD s/p PCI, HTN, HLD, DM2. Presented to [facility] as an emergent transfer for cystoscopy with clot evacuation.    Plan:   - dvt ppx: SCDs, holding chem dvt ppx at this time ISO hematuria  - diet: consistent carbohydrate   - f/u am labs, replete lytes prn  - will monitor DKA and reassess if require insulin gtt however not indicated at this time   - consult endo  - oxybutynin prn for bladder spasms  - MM pain control prn   - will f/u xray final read for PICC IV access   - titrate CBI  - trend H/H   - dispo planning    S/p Clot evacuation via cystoscopy, POD #2  - Being managed by Urology    Cystitis  - [place] [facility] [place]  - Urine Culture ordered.    DM2, elevated POC, DKA resolved  - Hold home DM2 medications: Glargine 44 units nightly, Linagliptin 5 mg daily, glipizide 5 mg BID, Jardiance 25 mg daily, metformin 500 mg daily  - Received 78 Units insulin over 24 hours.  - Increase ISS,   - 12 units Lispro, TID before meals.     HTN  - c/w carvedilol 25 mg BID  - c/w simvastatin 40 mg daily  - hold plavix 75 mg daily for now  - c/w lisinopril 5 mg daily    Hypothyroidism  - c/w home levothyroxine 50 mcg daily    - DVT ppx: SCDs 75 y/o Male PMH: CVA (residual R sided hemiplegia, aphasia, wheelchair), CAD s/p PCI, HTN, HLD, DM2. Presented to [facility] as an emergent transfer for cystoscopy with clot evacuation.    S/p Clot evacuation via cystoscopy, POD #2  - Being managed by Urology    Cystitis  - [place] [facility] [place]  - Urine Culture ordered.    DM2, elevated POC, DKA resolved  - Hold home DM2 medications: Glargine 44 units nightly, Linagliptin 5 mg daily, glipizide 5 mg BID, Jardiance 25 mg daily, metformin 500 mg daily  - Received 78 Units insulin over 24 hours.  - Increase ISS,   - 12 units Lispro, TID before meals.     HTN  - c/w carvedilol 25 mg BID  - c/w simvastatin 40 mg daily  - hold plavix 75 mg daily for now  - c/w lisinopril 5 mg daily    Hypothyroidism  - c/w home levothyroxine 50 mcg daily    - DVT ppx: SCDs 73 y/o Male PMH: CVA (residual R sided hemiplegia, aphasia, wheelchair), CAD s/p PCI, HTN, HLD, DM2. Presented to [facility] as an emergent transfer for cystoscopy with clot evacuation.    S/p Clot evacuation via cystoscopy, POD #2  - Being managed by Urology    Bladder Spasms, Improving.  - consider restarting oxybutynin    Cystitis  - Per [place] at [facility] [place]stitis  - Urine Culture ordered.    DM2, elevated POC, DKA resolved  - Hold home DM2 medications: Glargine 44 units nightly, Linagliptin 5 mg daily, glipizide 5 mg BID, Jardiance 25 mg daily, metformin 500 mg daily  - Received 78 Units insulin over 24 hours.  - Increase ISS,   - 12 units Lispro, TID before meals.     HTN  - c/w carvedilol 25 mg BID  - c/w simvastatin 40 mg daily  - hold plavix 75 mg daily for now  - c/w lisinopril 5 mg daily    Hypothyroidism  - c/w home levothyroxine 50 mcg daily    - DVT ppx: SCDs 73 y/o Male PMH: CVA (residual R sided hemiplegia, aphasia, wheelchair), CAD s/p PCI, HTN, HLD, DM2. Presented to [facility] as an emergent transfer for cystoscopy with clot evacuation.    S/p Clot evacuation via cystoscopy, POD #2  - Being managed by Urology  - Morris in place  - CBI in place  - monitor H/H     Bladder Spasms, Improving.  - consider restarting oxybutynin PRN    Cystitis  - [place] [facility] [place]  - Urine Culture ordered.    DM2, elevated POC, DKA resolved  - Hold home DM2 medications: Glargine 44 units nightly, Linagliptin 5 mg daily, glipizide 5 mg BID, Jardiance 25 mg daily, metformin 500 mg daily  - Received 78 Units insulin over 24 hours.  - Per Endo:   - Maintain ISS  - 12 units Lispro, TID before meals.     HTN  - c/w carvedilol 25 mg BID  - c/w simvastatin 40 mg daily  - hold plavix 75 mg daily for now  - c/w lisinopril 5 mg daily    Hypothyroidism  - c/w home levothyroxine 50 mcg daily    - DVT ppx: SCDs 73 y/o Male PMH: CVA (residual R sided hemiplegia, aphasia, wheelchair), CAD s/p PCI, HTN, HLD, DM2. Presented to [facility] as an emergent transfer for cystoscopy with clot evacuation.    S/p Clot evacuation via cystoscopy, POD #2  - Being managed by Urology  - Morris in place  - CBI in place  - monitor H/H     Bladder Spasms, Improving.  - consider restarting oxybutynin PRN    Cystitis  - Per [place]-A/P at [facility] [place] [place] showing cystitis  - cx from [facility] [place] [place] no growth to date    DM2, Hyperglycemia, DKA resolved  a1c 7.2%  - Hold home DM2 medications: Glargine 44 units nightly, Linagliptin 5 mg daily, glipizide 5 mg BID, Jardiance 25 mg daily, metformin 500 mg daily  - Received 78 Units insulin over 24 hours.  - Per Endo: change to lantus 44 units QHS AND Change to admelog 12 units TID  - Maintain ISS- moderate    HTN  - c/w carvedilol 25 mg BID  - c/w simvastatin 40 mg daily  - hold plavix 75 mg daily for now  - c/w lisinopril 5 mg daily    Hypothyroidism  - c/w home levothyroxine 50 mcg daily    Constipation  - start senna and miralax    - DVT ppx: SCDs 75 y/o Male PMH: CVA (residual R sided hemiplegia, aphasia, wheelchair), CAD s/p PCI, HTN, HLD, DM2. Presented to [facility] as an emergent transfer for cystoscopy with clot evacuation.    S/p Clot evacuation via cystoscopy, POD #2  - Being managed by Urology  - Morris in place  - CBI in place  - monitor H/H     Bladder Spasms, Improving.  - consider restarting oxybutynin PRN    Cystitis  - Per [place] [facility] [place]  - cx from [facility] [place] no growth to date    DM2, Hyperglycemia, DKA resolved  a1c 7.2%  - Hold home DM2 medications: Glargine 44 units nightly, Linagliptin 5 mg daily, glipizide 5 mg BID, Jardiance 25 mg daily, metformin 500 mg daily  - Received 78 Units insulin over 24 hours.  - Per Endo: change to lantus 44 units QHS AND Change to admelog 12 units TID  - Maintain ISS- moderate    HTN  - c/w carvedilol 25 mg BID  - c/w simvastatin 40 mg daily  - hold plavix 75 mg daily for now  - c/w lisinopril 5 mg daily    Hypothyroidism  - c/w home levothyroxine 50 mcg daily    Constipation  - start senna and miralax    BRITTANI  - patient to use own cpap    - DVT ppx: SCDs

## 2025-04-27 NOTE — CHART NOTE - NSCHARTNOTEFT_GEN_A_CORE
Patient seen at bedside. Patient resting comfortably in NARD while wearing their own CPAP mask. HR 55 SPO2 94%.

## 2025-04-28 ENCOUNTER — TRANSCRIPTION ENCOUNTER (OUTPATIENT)
Age: 75
End: 2025-04-28

## 2025-04-28 VITALS
HEART RATE: 69 BPM | DIASTOLIC BLOOD PRESSURE: 84 MMHG | SYSTOLIC BLOOD PRESSURE: 149 MMHG | OXYGEN SATURATION: 94 % | RESPIRATION RATE: 18 BRPM | TEMPERATURE: 98 F

## 2025-04-28 LAB
ANION GAP SERPL CALC-SCNC: 12 MMOL/L — SIGNIFICANT CHANGE UP (ref 5–17)
BUN SERPL-MCNC: 26.2 MG/DL — HIGH (ref 8–20)
CALCIUM SERPL-MCNC: 8.1 MG/DL — LOW (ref 8.4–10.5)
CHLORIDE SERPL-SCNC: 106 MMOL/L — SIGNIFICANT CHANGE UP (ref 96–108)
CO2 SERPL-SCNC: 22 MMOL/L — SIGNIFICANT CHANGE UP (ref 22–29)
CREAT SERPL-MCNC: 0.82 MG/DL — SIGNIFICANT CHANGE UP (ref 0.5–1.3)
EGFR: 92 ML/MIN/1.73M2 — SIGNIFICANT CHANGE UP
EGFR: 92 ML/MIN/1.73M2 — SIGNIFICANT CHANGE UP
GLUCOSE BLDC GLUCOMTR-MCNC: 240 MG/DL — HIGH (ref 70–99)
GLUCOSE BLDC GLUCOMTR-MCNC: 347 MG/DL — HIGH (ref 70–99)
GLUCOSE SERPL-MCNC: 151 MG/DL — HIGH (ref 70–99)
HCT VFR BLD CALC: 32.8 % — LOW (ref 39–50)
HGB BLD-MCNC: 10.4 G/DL — LOW (ref 13–17)
MAGNESIUM SERPL-MCNC: 1.8 MG/DL — SIGNIFICANT CHANGE UP (ref 1.8–2.6)
MCHC RBC-ENTMCNC: 30 PG — SIGNIFICANT CHANGE UP (ref 27–34)
MCHC RBC-ENTMCNC: 31.7 G/DL — LOW (ref 32–36)
MCV RBC AUTO: 94.5 FL — SIGNIFICANT CHANGE UP (ref 80–100)
NRBC # BLD AUTO: 0 K/UL — SIGNIFICANT CHANGE UP (ref 0–0)
NRBC # FLD: 0 K/UL — SIGNIFICANT CHANGE UP (ref 0–0)
NRBC BLD AUTO-RTO: 0 /100 WBCS — SIGNIFICANT CHANGE UP (ref 0–0)
PHOSPHATE SERPL-MCNC: 3.1 MG/DL — SIGNIFICANT CHANGE UP (ref 2.4–4.7)
PLATELET # BLD AUTO: 235 K/UL — SIGNIFICANT CHANGE UP (ref 150–400)
PMV BLD: 10.8 FL — SIGNIFICANT CHANGE UP (ref 7–13)
POTASSIUM SERPL-MCNC: 3.7 MMOL/L — SIGNIFICANT CHANGE UP (ref 3.5–5.3)
POTASSIUM SERPL-SCNC: 3.7 MMOL/L — SIGNIFICANT CHANGE UP (ref 3.5–5.3)
RBC # BLD: 3.47 M/UL — LOW (ref 4.2–5.8)
RBC # FLD: 14.4 % — SIGNIFICANT CHANGE UP (ref 10.3–14.5)
SODIUM SERPL-SCNC: 140 MMOL/L — SIGNIFICANT CHANGE UP (ref 135–145)
TSH SERPL-MCNC: 1.46 UIU/ML — SIGNIFICANT CHANGE UP (ref 0.27–4.2)
WBC # BLD: 7.78 K/UL — SIGNIFICANT CHANGE UP (ref 3.8–10.5)
WBC # FLD AUTO: 7.78 K/UL — SIGNIFICANT CHANGE UP (ref 3.8–10.5)

## 2025-04-28 PROCEDURE — 99232 SBSQ HOSP IP/OBS MODERATE 35: CPT

## 2025-04-28 RX ORDER — FINASTERIDE 1 MG/1
1 TABLET, FILM COATED ORAL
Qty: 0 | Refills: 0 | DISCHARGE
Start: 2025-04-28

## 2025-04-28 RX ORDER — CEPHALEXIN 250 MG/1
1 CAPSULE ORAL
Qty: 9 | Refills: 0
Start: 2025-04-28 | End: 2025-04-30

## 2025-04-28 RX ORDER — POLYETHYLENE GLYCOL 3350 17 G/17G
17 POWDER, FOR SOLUTION ORAL
Qty: 0 | Refills: 0 | DISCHARGE
Start: 2025-04-28

## 2025-04-28 RX ORDER — OXYBUTYNIN CHLORIDE 5 MG/1
1 TABLET, FILM COATED, EXTENDED RELEASE ORAL
Qty: 20 | Refills: 0
Start: 2025-04-28 | End: 2025-05-07

## 2025-04-28 RX ORDER — OXYBUTYNIN CHLORIDE 5 MG/1
1 TABLET, FILM COATED, EXTENDED RELEASE ORAL
Qty: 0 | Refills: 0
Start: 2025-04-28

## 2025-04-28 RX ADMIN — Medication 975 MILLIGRAM(S): at 11:58

## 2025-04-28 RX ADMIN — INSULIN LISPRO 12 UNIT(S): 100 INJECTION, SOLUTION INTRAVENOUS; SUBCUTANEOUS at 11:45

## 2025-04-28 RX ADMIN — Medication 50 MICROGRAM(S): at 05:22

## 2025-04-28 RX ADMIN — LISINOPRIL 5 MILLIGRAM(S): 5 TABLET ORAL at 05:22

## 2025-04-28 RX ADMIN — Medication 1 APPLICATION(S): at 12:10

## 2025-04-28 RX ADMIN — CARVEDILOL 25 MILLIGRAM(S): 3.12 TABLET, FILM COATED ORAL at 05:22

## 2025-04-28 RX ADMIN — Medication 975 MILLIGRAM(S): at 05:21

## 2025-04-28 RX ADMIN — Medication 975 MILLIGRAM(S): at 00:33

## 2025-04-28 RX ADMIN — INSULIN LISPRO 4: 100 INJECTION, SOLUTION INTRAVENOUS; SUBCUTANEOUS at 11:44

## 2025-04-28 RX ADMIN — OXYBUTYNIN CHLORIDE 5 MILLIGRAM(S): 5 TABLET, FILM COATED, EXTENDED RELEASE ORAL at 05:22

## 2025-04-28 RX ADMIN — INSULIN LISPRO 8: 100 INJECTION, SOLUTION INTRAVENOUS; SUBCUTANEOUS at 09:04

## 2025-04-28 RX ADMIN — FINASTERIDE 5 MILLIGRAM(S): 1 TABLET, FILM COATED ORAL at 11:58

## 2025-04-28 RX ADMIN — Medication 975 MILLIGRAM(S): at 06:20

## 2025-04-28 RX ADMIN — INSULIN LISPRO 12 UNIT(S): 100 INJECTION, SOLUTION INTRAVENOUS; SUBCUTANEOUS at 09:04

## 2025-04-28 NOTE — DISCHARGE NOTE NURSING/CASE MANAGEMENT/SOCIAL WORK - NSDCFUADDAPPT_GEN_ALL_CORE_FT
Patient discharged today with follow up instructions ( to contact their Urologist  Dr. Gloria at [facility] [place] to schedule follow up for next week in his office. They understand and were told if any problems they can return to [facility] for evaluation.

## 2025-04-28 NOTE — PROGRESS NOTE ADULT - ASSESSMENT
75 y/o Male PMH: CVA (residual R sided hemiplegia, aphasia, wheelchair), CAD s/p PCI, HTN, HLD, DM2. Presented to [facility] as an emergent transfer for cystoscopy with clot evacuation.    S/p Clot evacuation via cystoscopy, POD #3  - Being managed by Urology  - Morris in place  - monitor H/H     Cystitis  - Per [place] [facility] [place]  - cx [place] [facility] [place] no growth to date    DM2, Hyperglycemia, DKA resolved  a1c 7.2%  - Hold home DM2 medications: Glargine 44 units nightly, Linagliptin 5 mg daily, glipizide 5 mg BID, Jardiance 25 mg daily, metformin 500 mg daily  - on Discharged patient to resume his home regimen above.   - While admitted cont. lantus 44 units QHS AND Change to admelog 12 units TID  - Maintain ISS- moderate    HTN  - c/w carvedilol 25 mg BID  - c/w simvastatin 40 mg daily  - hold plavix 75 mg daily for now and resume when ok by Urology  - c/w lisinopril 5 mg daily    Hypothyroidism  - c/w home levothyroxine 50 mcg daily    Constipation, resolved  - Senna and miralax    BRITTANI  - patient to use own cpap    - DVT ppx: SCDs  Dispo: ok to MD home once cleared by Urology.

## 2025-04-28 NOTE — PROGRESS NOTE ADULT - SUBJECTIVE AND OBJECTIVE BOX
Follow up: diabetes, hypothyroid  Interval Hx/Events: feels well, wife at bedside, no issues and eating well, going home today    MEDICATIONS  (STANDING):  acetaminophen     Tablet .. 975 milliGRAM(s) Oral every 6 hours  acetaminophen   IVPB .. 1000 milliGRAM(s) IV Intermittent once  allopurinol 100 milliGRAM(s) Oral at bedtime  atorvastatin 20 milliGRAM(s) Oral at bedtime  carvedilol 25 milliGRAM(s) Oral every 12 hours  chlorhexidine 2% Cloths 1 Application(s) Topical daily  dextrose 5%. 1000 milliLiter(s) (100 mL/Hr) IV Continuous <Continuous>  dextrose 5%. 1000 milliLiter(s) (50 mL/Hr) IV Continuous <Continuous>  dextrose 50% Injectable 25 Gram(s) IV Push once  dextrose 50% Injectable 12.5 Gram(s) IV Push once  dextrose 50% Injectable 25 Gram(s) IV Push once  finasteride 5 milliGRAM(s) Oral daily  glucagon  Injectable 1 milliGRAM(s) IntraMuscular once  insulin glargine Injectable (LANTUS) 44 Unit(s) SubCutaneous at bedtime  insulin lispro (ADMELOG) corrective regimen sliding scale   SubCutaneous three times a day before meals  insulin lispro (ADMELOG) corrective regimen sliding scale   SubCutaneous at bedtime  insulin lispro Injectable (ADMELOG) 12 Unit(s) SubCutaneous three times a day before meals  levothyroxine 50 MICROGram(s) Oral daily  lisinopril 5 milliGRAM(s) Oral daily  polyethylene glycol 3350 17 Gram(s) Oral daily  psyllium Powder 1 Packet(s) Oral at bedtime  senna 2 Tablet(s) Oral at bedtime    MEDICATIONS  (PRN):  dextrose Oral Gel 15 Gram(s) Oral once PRN Blood Glucose LESS THAN 70 milliGRAM(s)/deciliter  oxybutynin 5 milliGRAM(s) Oral every 8 hours PRN Bladder spasms    ALLERGIES: No Known Allergies    Vital Signs Last 24 Hrs  T(C): 36.7 (28 Apr 2025 08:24), Max: 36.9 (28 Apr 2025 00:04)  T(F): 98.1 (28 Apr 2025 08:24), Max: 98.5 (28 Apr 2025 00:04)  HR: 67 (28 Apr 2025 08:24) (59 - 71)  BP: 132/73 (28 Apr 2025 08:24) (111/69 - 151/85)  BP(mean): --  RR: 18 (28 Apr 2025 08:24) (16 - 18)  SpO2: 94% (28 Apr 2025 08:24) (94% - 96%)    Parameters below as of 28 Apr 2025 08:24  Patient On (Oxygen Delivery Method): room air    Physical Exam:  General appearance: NAD  Eyes:  EOMI  Lungs: Normal respiratory excursion. Lungs clear no w/r/r  CV: Normal S1S2, regular. No m/r/g.   Abdomen: Soft, nontender, nondistended, (+) BS  Musculoskeletal: No cyanosis, clubbing, or edema.  Skin: Warm and moist.   Neuro: Cranial nerves intact.   Psych: Normal affect, good judgement/insight      LABS:                        10.4   7.78  )-----------( 235      ( 28 Apr 2025 05:56 )             32.8     04-28    140  |  106  |  26.2[H]  ----------------------------<  151[H]  3.7   |  22.0  |  0.82    Ca    8.1[L]      28 Apr 2025 05:56  Phos  3.1     04-28  Mg     1.8     04-28    TPro  5.9[L]  /  Alb  3.0[L]  /  TBili  0.2[L]  /  DBili  x   /  AST  8   /  ALT  7   /  AlkPhos  85  04-26    LIVER FUNCTIONS - ( 26 Apr 2025 16:44 )  Alb: 3.0 g/dL / Pro: 5.9 g/dL / ALK PHOS: 85 U/L / ALT: 7 U/L / AST: 8 U/L / GGT: x             A1C with Estimated Average Glucose Result: 7.3 % (04-27-25 @ 05:29)      CAPILLARY BLOOD GLUCOSE  POCT Blood Glucose.: 240 mg/dL (28 Apr 2025 11:42)  POCT Blood Glucose.: 347 mg/dL (28 Apr 2025 08:56)  POCT Blood Glucose.: 153 mg/dL (27 Apr 2025 21:08)  POCT Blood Glucose.: 180 mg/dL (27 Apr 2025 17:35)  POCT Blood Glucose.: 231 mg/dL (27 Apr 2025 13:00)  POCT Blood Glucose.: 220 mg/dL (27 Apr 2025 08:54)  POCT Blood Glucose.: 299 mg/dL (26 Apr 2025 22:23)  POCT Blood Glucose.: 302 mg/dL (26 Apr 2025 16:14)      Thyroid Stimulating Hormone, Serum: 1.46 uIU/mL [0.27 - 4.20] (04-28-25)

## 2025-04-28 NOTE — DISCHARGE NOTE PROVIDER - CARE PROVIDER_API CALL
Richie Correia  Urology  [address], [address]  [place] [address]  Phone: [phone]  Fax: [phone]  Follow Up Time:

## 2025-04-28 NOTE — DISCHARGE NOTE NURSING/CASE MANAGEMENT/SOCIAL WORK - PATIENT PORTAL LINK FT
You can access the FollowMyHealth Patient Portal offered by [facility] by registering at the following website: [email or web address]. By joining [employer]’s FollowMyHealth portal, you will also be able to view your health information using other applications (apps) compatible with our system.

## 2025-04-28 NOTE — PROGRESS NOTE ADULT - SUBJECTIVE AND OBJECTIVE BOX
[facility] Division of Hospital Medicine    Chief Complaint:  Hematuria, urinary retention, cystoscopy with clot evacuation    SUBJECTIVE / OVERNIGHT EVENTS: Patient seen and examined. Had a large BM. Less spasms today. No complaints. Wife at bedside and questions answered.     Patient denies chest pain, SOB, abd pain, N/V, fever, chills, dysuria or any other complaints. All remainder ROS negative.     MEDICATIONS  (STANDING):  acetaminophen     Tablet .. 975 milliGRAM(s) Oral every 6 hours  acetaminophen   IVPB .. 1000 milliGRAM(s) IV Intermittent once  allopurinol 100 milliGRAM(s) Oral at bedtime  atorvastatin 20 milliGRAM(s) Oral at bedtime  carvedilol 25 milliGRAM(s) Oral every 12 hours  chlorhexidine 2% Cloths 1 Application(s) Topical daily  dextrose 5%. 1000 milliLiter(s) (100 mL/Hr) IV Continuous <Continuous>  dextrose 5%. 1000 milliLiter(s) (50 mL/Hr) IV Continuous <Continuous>  dextrose 50% Injectable 25 Gram(s) IV Push once  dextrose 50% Injectable 12.5 Gram(s) IV Push once  dextrose 50% Injectable 25 Gram(s) IV Push once  finasteride 5 milliGRAM(s) Oral daily  glucagon  Injectable 1 milliGRAM(s) IntraMuscular once  insulin glargine Injectable (LANTUS) 44 Unit(s) SubCutaneous at bedtime  insulin lispro (ADMELOG) corrective regimen sliding scale   SubCutaneous three times a day before meals  insulin lispro (ADMELOG) corrective regimen sliding scale   SubCutaneous at bedtime  insulin lispro Injectable (ADMELOG) 12 Unit(s) SubCutaneous three times a day before meals  levothyroxine 50 MICROGram(s) Oral daily  lisinopril 5 milliGRAM(s) Oral daily  polyethylene glycol 3350 17 Gram(s) Oral daily  psyllium Powder 1 Packet(s) Oral at bedtime  senna 2 Tablet(s) Oral at bedtime    MEDICATIONS  (PRN):  dextrose Oral Gel 15 Gram(s) Oral once PRN Blood Glucose LESS THAN 70 milliGRAM(s)/deciliter  oxybutynin 5 milliGRAM(s) Oral every 8 hours PRN Bladder spasms        I&O's Summary      PHYSICAL EXAM:  Vital Signs Last 24 Hrs  T(C): 36.7 (28 Apr 2025 08:24), Max: 36.9 (28 Apr 2025 00:04)  T(F): 98.1 (28 Apr 2025 08:24), Max: 98.5 (28 Apr 2025 00:04)  HR: 67 (28 Apr 2025 08:24) (59 - 71)  BP: 132/73 (28 Apr 2025 08:24) (111/69 - 151/85)  BP(mean): --  RR: 18 (28 Apr 2025 08:24) (16 - 18)  SpO2: 94% (28 Apr 2025 08:24) (94% - 96%)    Parameters below as of 28 Apr 2025 08:24  Patient On (Oxygen Delivery Method): room air      PHYSICAL EXAM:  Constitutional: No acute distress, alert and oriented by 3  HEENT: AT/NC, EOMI, PERRLA, Normal conjunctiva, no pharyngeal erythema, moist oral mucosa  Respiratory: CTA BL, equal breath sounds, no crackles or wheezing  Cardiovascular: RRR, no edema  Gastrointestinal: soft, Non-tender, Non-distended + Bowel sounds, no rebound or guarding  Genitourinary: + stout  Musculoskeletal: No joint edema  Neurological: CN 2-12 grossly intact, right sided weakness, + aphasia  Skin: warm, dry and intact  Psychiatric: normal mood and affect    LABS:                        10.4   7.78  )-----------( 235      ( 28 Apr 2025 05:56 )             32.8     04-28    140  |  106  |  26.2[H]  ----------------------------<  151[H]  3.7   |  22.0  |  0.82    Ca    8.1[L]      28 Apr 2025 05:56  Phos  3.1     04-28  Mg     1.8     04-28    TPro  5.9[L]  /  Alb  3.0[L]  /  TBili  0.2[L]  /  DBili  x   /  AST  8   /  ALT  7   /  AlkPhos  85  04-26          Urinalysis Basic - ( 28 Apr 2025 05:56 )    Color: x / Appearance: x / SG: x / pH: x  Gluc: 151 mg/dL / Ketone: x  / Bili: x / Urobili: x   Blood: x / Protein: x / Nitrite: x   Leuk Esterase: x / RBC: x / WBC x   Sq Epi: x / Non Sq Epi: x / Bacteria: x        CAPILLARY BLOOD GLUCOSE      POCT Blood Glucose.: 347 mg/dL (28 Apr 2025 08:56)  POCT Blood Glucose.: 153 mg/dL (27 Apr 2025 21:08)  POCT Blood Glucose.: 180 mg/dL (27 Apr 2025 17:35)  POCT Blood Glucose.: 231 mg/dL (27 Apr 2025 13:00)        RADIOLOGY & ADDITIONAL TESTS:  Results Reviewed:   Imaging Personally Reviewed:  Electrocardiogram Personally Reviewed:                                           [facility] Division of Hospital Medicine    Chief Complaint:  Hematuria, urinary retention, cystoscopy with clot evacuation    SUBJECTIVE / OVERNIGHT EVENTS: Patient seen and examined. Had a large BM. Less spasms today. No complaints. Wife at bedside and questions answered. blood sugar of 347 was done after patient had eaten therefore it is not accurate.     Patient denies chest pain, SOB, abd pain, N/V, fever, chills, dysuria or any other complaints. All remainder ROS negative.     MEDICATIONS  (STANDING):  acetaminophen     Tablet .. 975 milliGRAM(s) Oral every 6 hours  acetaminophen   IVPB .. 1000 milliGRAM(s) IV Intermittent once  allopurinol 100 milliGRAM(s) Oral at bedtime  atorvastatin 20 milliGRAM(s) Oral at bedtime  carvedilol 25 milliGRAM(s) Oral every 12 hours  chlorhexidine 2% Cloths 1 Application(s) Topical daily  dextrose 5%. 1000 milliLiter(s) (100 mL/Hr) IV Continuous <Continuous>  dextrose 5%. 1000 milliLiter(s) (50 mL/Hr) IV Continuous <Continuous>  dextrose 50% Injectable 25 Gram(s) IV Push once  dextrose 50% Injectable 12.5 Gram(s) IV Push once  dextrose 50% Injectable 25 Gram(s) IV Push once  finasteride 5 milliGRAM(s) Oral daily  glucagon  Injectable 1 milliGRAM(s) IntraMuscular once  insulin glargine Injectable (LANTUS) 44 Unit(s) SubCutaneous at bedtime  insulin lispro (ADMELOG) corrective regimen sliding scale   SubCutaneous three times a day before meals  insulin lispro (ADMELOG) corrective regimen sliding scale   SubCutaneous at bedtime  insulin lispro Injectable (ADMELOG) 12 Unit(s) SubCutaneous three times a day before meals  levothyroxine 50 MICROGram(s) Oral daily  lisinopril 5 milliGRAM(s) Oral daily  polyethylene glycol 3350 17 Gram(s) Oral daily  psyllium Powder 1 Packet(s) Oral at bedtime  senna 2 Tablet(s) Oral at bedtime    MEDICATIONS  (PRN):  dextrose Oral Gel 15 Gram(s) Oral once PRN Blood Glucose LESS THAN 70 milliGRAM(s)/deciliter  oxybutynin 5 milliGRAM(s) Oral every 8 hours PRN Bladder spasms        I&O's Summary      PHYSICAL EXAM:  Vital Signs Last 24 Hrs  T(C): 36.7 (28 Apr 2025 08:24), Max: 36.9 (28 Apr 2025 00:04)  T(F): 98.1 (28 Apr 2025 08:24), Max: 98.5 (28 Apr 2025 00:04)  HR: 67 (28 Apr 2025 08:24) (59 - 71)  BP: 132/73 (28 Apr 2025 08:24) (111/69 - 151/85)  BP(mean): --  RR: 18 (28 Apr 2025 08:24) (16 - 18)  SpO2: 94% (28 Apr 2025 08:24) (94% - 96%)    Parameters below as of 28 Apr 2025 08:24  Patient On (Oxygen Delivery Method): room air      PHYSICAL EXAM:  Constitutional: No acute distress, alert and oriented by 3  HEENT: AT/NC, EOMI, PERRLA, Normal conjunctiva, no pharyngeal erythema, moist oral mucosa  Respiratory: CTA BL, equal breath sounds, no crackles or wheezing  Cardiovascular: RRR, no edema  Gastrointestinal: soft, Non-tender, Non-distended + Bowel sounds, no rebound or guarding  Genitourinary: + stout  Musculoskeletal: No joint edema  Neurological: CN 2-12 grossly intact, right sided weakness, + aphasia  Skin: warm, dry and intact  Psychiatric: normal mood and affect    LABS:                        10.4   7.78  )-----------( 235      ( 28 Apr 2025 05:56 )             32.8     04-28    140  |  106  |  26.2[H]  ----------------------------<  151[H]  3.7   |  22.0  |  0.82    Ca    8.1[L]      28 Apr 2025 05:56  Phos  3.1     04-28  Mg     1.8     04-28    TPro  5.9[L]  /  Alb  3.0[L]  /  TBili  0.2[L]  /  DBili  x   /  AST  8   /  ALT  7   /  AlkPhos  85  04-26          Urinalysis Basic - ( 28 Apr 2025 05:56 )    Color: x / Appearance: x / SG: x / pH: x  Gluc: 151 mg/dL / Ketone: x  / Bili: x / Urobili: x   Blood: x / Protein: x / Nitrite: x   Leuk Esterase: x / RBC: x / WBC x   Sq Epi: x / Non Sq Epi: x / Bacteria: x        CAPILLARY BLOOD GLUCOSE      POCT Blood Glucose.: 347 mg/dL (28 Apr 2025 08:56)  POCT Blood Glucose.: 153 mg/dL (27 Apr 2025 21:08)  POCT Blood Glucose.: 180 mg/dL (27 Apr 2025 17:35)  POCT Blood Glucose.: 231 mg/dL (27 Apr 2025 13:00)        RADIOLOGY & ADDITIONAL TESTS:  Results Reviewed:   Imaging Personally Reviewed:  Electrocardiogram Personally Reviewed:

## 2025-04-28 NOTE — DISCHARGE NOTE NURSING/CASE MANAGEMENT/SOCIAL WORK - NSDCPEPTCAREGIVEDUMATLIST _GEN_ALL_CORE
Stroke/Diabetes Treatment Goal Explanation (Does Not Render In The Note): Stable for the purposes of categorizing medical decision making is defined by the specific treatment goals for an individual patient. A patient that is not at their treatment goal is not stable, even if the condition has not changed and there is no short- term threat to life or function. Treatment Goal Met?: yes

## 2025-04-28 NOTE — DISCHARGE NOTE NURSING/CASE MANAGEMENT/SOCIAL WORK - FINANCIAL ASSISTANCE
[facility] provides services at a reduced cost to those who are determined to be eligible through [facility]’s financial assistance program. Information regarding [facility]’s financial assistance program can be found by going to [email or web address] or by calling [phone].

## 2025-04-28 NOTE — DISCHARGE NOTE PROVIDER - NSDCFUADDAPPT_GEN_ALL_CORE_FT
Patient discharged today with follow up instructions ( to contact their Urologist  Dr. Gloria at [facility] ) to schedule follow up for next week in his office. They understand and were told if any problems they can return to [facility] for evaluation.

## 2025-04-28 NOTE — DISCHARGE NOTE PROVIDER - NSDCHC_MEDRECSTATUS_GEN_ALL_CORE
Detail Level: Detailed Admission Reconciliation is Completed  Discharge Reconciliation is Completed Detail Level: Generalized Detail Level: Zone

## 2025-04-28 NOTE — DISCHARGE NOTE PROVIDER - HOSPITAL COURSE
70 yo male with PMHx of CVA, cad s/p PCI, HTN, HLD, DM, wheelchair bound due to hx of stroke transferred from [facility] for evaluation and clot evacuation. Patient had a failed cysto and clot evacuation at [facility] due to a false passage and was transferred here emergently.  Seen by Urology and patient was prepped for the OR.  Patient then went to the OR and under going successful cystoscopy and evacuation of clot with fulguration bleeding.  Patient POD# 1 did well tolerated surgery with patient reporting intermittent penile pain and suprapubic pain described as spasms as CBI is running. He is also experiencing some bleeding at penile meatus that has since stopped when evaluated at bedside. patient denies any additional acute complaints .  Patient slowly continued to improve and POD# 2 feeling better, still with mild bladder spasms , stout output good urine noted yellow and patients lab work was stable.  Patient with passing flatus and with good bowel movement and thus patient was eager to go home.  Discussed with wife and patient to be discharged with the stout catheter and should follow up with their Urologist at [facility]. ( Dr. Wadsworth ) They are aware and understand.  Discharge today home with follow up instruction and family aware and will continue stout management - with can to leg bag during day and outings and stout for over night.

## 2025-04-28 NOTE — PROGRESS NOTE ADULT - SUBJECTIVE AND OBJECTIVE BOX
Urology f/U;    POD# 3 s/p cysto- clot evacuation with fulguration bladder & prostate  Patient seen and examined at bedside with wife present.  Patient awake, alert, responsive and feeling better.  Denies headache, dizziness, pain and discomfort.  Patient passing flatus and with bowel movements this am.      Vital Signs Last 24 Hrs  T(C): 36.7 (28 Apr 2025 08:24), Max: 36.9 (28 Apr 2025 00:04)  T(F): 98.1 (28 Apr 2025 08:24), Max: 98.5 (28 Apr 2025 00:04)  HR: 67 (28 Apr 2025 08:24) (59 - 71)  BP: 132/73 (28 Apr 2025 08:24) (111/69 - 151/85)  BP(mean): --  RR: 18 (28 Apr 2025 08:24) (16 - 18)  SpO2: 94% (28 Apr 2025 08:24) (94% - 96%)    Parameters below as of 28 Apr 2025 08:24  Patient On (Oxygen Delivery Method): room air    MEDICATIONS  (STANDING):  acetaminophen     Tablet .. 975 milliGRAM(s) Oral every 6 hours  acetaminophen   IVPB .. 1000 milliGRAM(s) IV Intermittent once  allopurinol 100 milliGRAM(s) Oral at bedtime  atorvastatin 20 milliGRAM(s) Oral at bedtime  carvedilol 25 milliGRAM(s) Oral every 12 hours  chlorhexidine 2% Cloths 1 Application(s) Topical daily  dextrose 5%. 1000 milliLiter(s) (100 mL/Hr) IV Continuous <Continuous>  dextrose 5%. 1000 milliLiter(s) (50 mL/Hr) IV Continuous <Continuous>  dextrose 50% Injectable 25 Gram(s) IV Push once  dextrose 50% Injectable 12.5 Gram(s) IV Push once  dextrose 50% Injectable 25 Gram(s) IV Push once  finasteride 5 milliGRAM(s) Oral daily  glucagon  Injectable 1 milliGRAM(s) IntraMuscular once  insulin glargine Injectable (LANTUS) 44 Unit(s) SubCutaneous at bedtime  insulin lispro (ADMELOG) corrective regimen sliding scale   SubCutaneous three times a day before meals  insulin lispro (ADMELOG) corrective regimen sliding scale   SubCutaneous at bedtime  insulin lispro Injectable (ADMELOG) 12 Unit(s) SubCutaneous three times a day before meals  levothyroxine 50 MICROGram(s) Oral daily  lisinopril 5 milliGRAM(s) Oral daily  polyethylene glycol 3350 17 Gram(s) Oral daily  psyllium Powder 1 Packet(s) Oral at bedtime  senna 2 Tablet(s) Oral at bedtime    MEDICATIONS  (PRN):  dextrose Oral Gel 15 Gram(s) Oral once PRN Blood Glucose LESS THAN 70 milliGRAM(s)/deciliter  oxybutynin 5 milliGRAM(s) Oral every 8 hours PRN Bladder spasms      abdomen:  soft n/d, n/t +bs, +flatus, +bm.   :  Morris in place with good output- urine yellow.    Labs:  CBC:                        10.4   7.78  )-----------( 235      ( 28 Apr 2025 05:56 )             32.8     Chemistry:  04-28    140  |  106  |  26.2[H]  ----------------------------<  151[H]  3.7   |  22.0  |  0.82        Impression:  stable POD# 3 improving ,urine yellow, with good output and + bowel function.     Discussed with Surgeon present situation with continued care and management  Plan:  discharge planning for today. With follow up instruction to call and make appointment with his Urologist Dr. Wadsworth for next week.

## 2025-04-28 NOTE — PROGRESS NOTE ADULT - ASSESSMENT
73 y/o Male h/o CVA (residual R sided hemiplegia, aphasia, wheelchair), CAD s/p PCI, HTN, HLD, DM2. Presented to [facility] as an emergent transfer for cystoscopy with clot evacuation    1. T2DM (A1c 7.3%, home meds: Glargine 44 units nightly, Linagliptin 5 mg daily, glipizide 5 mg BID, Jardiance 25 mg daily) - glucoses suboptimal   - cont Lantus 44 units HS  - if staying in hospital, increase Admelog to 16 units premeals and cont Admelog scale  - if going home, can resume outpatient diabetic regimen as he was better controlled outpt  - he will follow up with his out pt endo, Dr Noonan    2. Hypothyroid  - cont LT4 50 mcg daily    3. HLD   - cont statin    4. S/p Clot evacuation via cystoscopy  - Being managed by Urology  - Morris in place

## 2025-04-28 NOTE — DISCHARGE NOTE PROVIDER - NSDCMRMEDTOKEN_GEN_ALL_CORE_FT
allopurinol 100 mg oral tablet: orally once a day  carvedilol 25 mg oral tablet: 1 tab(s) orally 2 times a day  cephalexin 500 mg oral capsule: 1 cap(s) orally 3 times a day MDD: 3 caps  finasteride 5 mg oral tablet: 1 tab(s) orally once a day  levothyroxine 50 mcg (0.05 mg) oral tablet: 1 tab(s) orally once a day  lisinopril 5 mg oral tablet: 1 tab(s) orally once a day  MetFORMIN (Eqv-Glucophage XR) 500 mg oral tablet, extended release: 1 tab(s) orally once a day  oxyBUTYnin 5 mg oral tablet: 1 tab(s) orally 2 times a day as needed for Bladder spasms MDD: 2 tabs  Plavix 75 mg oral tablet: 1 tab(s) orally once a day  polyethylene glycol 3350 oral powder for reconstitution: 17 gram(s) orally once a day  simvastatin 40 mg oral tablet: 1 tab(s) orally once a day (at bedtime)

## 2025-04-28 NOTE — DISCHARGE NOTE PROVIDER - NSDCFUADDINST_GEN_ALL_CORE_FT
May return back to his normal motor activity- wheel chair bound , right hemiplegia ans some aphasia due to previous stroke.

## 2025-04-29 PROBLEM — I63.9 CEREBRAL INFARCTION, UNSPECIFIED: Chronic | Status: ACTIVE | Noted: 2025-04-25

## 2025-04-29 PROBLEM — I10 ESSENTIAL (PRIMARY) HYPERTENSION: Chronic | Status: ACTIVE | Noted: 2025-04-25

## 2025-04-29 PROBLEM — E11.9 TYPE 2 DIABETES MELLITUS WITHOUT COMPLICATIONS: Chronic | Status: ACTIVE | Noted: 2025-04-25

## 2025-04-29 PROBLEM — E78.5 HYPERLIPIDEMIA, UNSPECIFIED: Chronic | Status: ACTIVE | Noted: 2025-04-25

## 2025-05-06 ENCOUNTER — APPOINTMENT (OUTPATIENT)
Dept: UROLOGY | Facility: CLINIC | Age: 75
End: 2025-05-06
Payer: MEDICARE

## 2025-05-06 VITALS
WEIGHT: 267 LBS | BODY MASS INDEX: 30.89 KG/M2 | HEART RATE: 71 BPM | HEIGHT: 78 IN | TEMPERATURE: 97.3 F | DIASTOLIC BLOOD PRESSURE: 64 MMHG | SYSTOLIC BLOOD PRESSURE: 115 MMHG

## 2025-05-06 DIAGNOSIS — Z86.73 PERSONAL HISTORY OF TRANSIENT ISCHEMIC ATTACK (TIA), AND CEREBRAL INFARCTION W/OUT RESIDUAL DEFICITS: ICD-10-CM

## 2025-05-06 DIAGNOSIS — Z86.39 PERSONAL HISTORY OF OTHER ENDOCRINE, NUTRITIONAL AND METABOLIC DISEASE: ICD-10-CM

## 2025-05-06 DIAGNOSIS — Z86.69 PERSONAL HISTORY OF OTHER DISEASES OF THE NERVOUS SYSTEM AND SENSE ORGANS: ICD-10-CM

## 2025-05-06 DIAGNOSIS — Z85.828 PERSONAL HISTORY OF OTHER MALIGNANT NEOPLASM OF SKIN: ICD-10-CM

## 2025-05-06 DIAGNOSIS — Z86.79 PERSONAL HISTORY OF OTHER DISEASES OF THE CIRCULATORY SYSTEM: ICD-10-CM

## 2025-05-06 DIAGNOSIS — Z87.442 PERSONAL HISTORY OF URINARY CALCULI: ICD-10-CM

## 2025-05-06 DIAGNOSIS — I25.2 OLD MYOCARDIAL INFARCTION: ICD-10-CM

## 2025-05-06 DIAGNOSIS — Z87.448 PERSONAL HISTORY OF OTHER DISEASES OF URINARY SYSTEM: ICD-10-CM

## 2025-05-06 DIAGNOSIS — Z78.9 OTHER SPECIFIED HEALTH STATUS: ICD-10-CM

## 2025-05-06 DIAGNOSIS — I69.320 APHASIA FOLLOWING CEREBRAL INFARCTION: ICD-10-CM

## 2025-05-06 PROCEDURE — 99213 OFFICE O/P EST LOW 20 MIN: CPT

## 2025-05-06 RX ORDER — METFORMIN HYDROCHLORIDE 500 MG/1
500 TABLET, COATED ORAL
Refills: 0 | Status: ACTIVE | COMMUNITY

## 2025-05-06 RX ORDER — ASPIRIN 81 MG
81 TABLET, DELAYED RELEASE (ENTERIC COATED) ORAL
Refills: 0 | Status: ACTIVE | COMMUNITY

## 2025-05-06 RX ORDER — EMPAGLIFLOZIN 25 MG/1
25 TABLET, FILM COATED ORAL
Refills: 0 | Status: ACTIVE | COMMUNITY

## 2025-05-06 RX ORDER — LEVOTHYROXINE SODIUM 0.05 MG/1
50 TABLET ORAL
Refills: 0 | Status: ACTIVE | COMMUNITY

## 2025-05-06 RX ORDER — FINASTERIDE 5 MG/1
5 TABLET, FILM COATED ORAL
Refills: 0 | Status: ACTIVE | COMMUNITY

## 2025-05-06 RX ORDER — CARVEDILOL 25 MG/1
25 TABLET, FILM COATED ORAL
Refills: 0 | Status: ACTIVE | COMMUNITY

## 2025-05-06 RX ORDER — GLIPIZIDE 5 MG/1
5 TABLET ORAL
Refills: 0 | Status: ACTIVE | COMMUNITY

## 2025-05-06 RX ORDER — CLOPIDOGREL 75 MG/1
75 TABLET, FILM COATED ORAL
Refills: 0 | Status: ACTIVE | COMMUNITY

## 2025-05-06 RX ORDER — LISINOPRIL 10 MG/1
10 TABLET ORAL
Refills: 0 | Status: ACTIVE | COMMUNITY

## 2025-05-06 RX ORDER — TRAMADOL HYDROCHLORIDE 50 MG/1
50 TABLET, COATED ORAL
Refills: 0 | Status: ACTIVE | COMMUNITY

## 2025-05-06 RX ORDER — POTASSIUM CITRATE 15 MEQ/1
15 MEQ TABLET, EXTENDED RELEASE ORAL
Refills: 0 | Status: ACTIVE | COMMUNITY

## 2025-05-06 RX ORDER — SIMVASTATIN 40 MG/1
40 TABLET, FILM COATED ORAL
Refills: 0 | Status: ACTIVE | COMMUNITY

## 2025-05-06 RX ORDER — ALLOPURINOL 100 MG/1
100 TABLET ORAL
Refills: 0 | Status: ACTIVE | COMMUNITY

## 2025-05-06 RX ORDER — BLACK COHOSH ROOT EXTRACT 80 MG
100 CAPSULE ORAL
Refills: 0 | Status: ACTIVE | COMMUNITY

## 2025-05-07 PROBLEM — Z86.73 HISTORY OF CEREBROVASCULAR ACCIDENT: Status: RESOLVED | Noted: 2025-05-06 | Resolved: 2025-05-07

## 2025-05-09 ENCOUNTER — APPOINTMENT (OUTPATIENT)
Dept: UROLOGY | Facility: CLINIC | Age: 75
End: 2025-05-09
Payer: MEDICARE

## 2025-05-09 VITALS
HEART RATE: 63 BPM | BODY MASS INDEX: 30.89 KG/M2 | WEIGHT: 267 LBS | TEMPERATURE: 98 F | SYSTOLIC BLOOD PRESSURE: 126 MMHG | DIASTOLIC BLOOD PRESSURE: 69 MMHG | HEIGHT: 78 IN

## 2025-05-09 DIAGNOSIS — R31.0 GROSS HEMATURIA: ICD-10-CM

## 2025-05-09 PROCEDURE — 99213 OFFICE O/P EST LOW 20 MIN: CPT

## 2025-05-16 ENCOUNTER — INPATIENT (INPATIENT)
Facility: HOSPITAL | Age: 75
LOS: 10 days | Discharge: HOME | DRG: 700 | End: 2025-05-27
Attending: INTERNAL MEDICINE | Admitting: INTERNAL MEDICINE
Payer: MEDICARE

## 2025-05-16 ENCOUNTER — APPOINTMENT (OUTPATIENT)
Dept: UROLOGY | Facility: CLINIC | Age: 75
End: 2025-05-16

## 2025-05-16 VITALS
WEIGHT: 296.52 LBS | SYSTOLIC BLOOD PRESSURE: 137 MMHG | HEIGHT: 77 IN | OXYGEN SATURATION: 99 % | DIASTOLIC BLOOD PRESSURE: 79 MMHG | HEART RATE: 108 BPM | TEMPERATURE: 98 F | RESPIRATION RATE: 25 BRPM

## 2025-05-16 DIAGNOSIS — N32.89 OTHER SPECIFIED DISORDERS OF BLADDER: ICD-10-CM

## 2025-05-16 LAB
ANION GAP SERPL CALC-SCNC: 17 MMOL/L — SIGNIFICANT CHANGE UP (ref 5–17)
APTT BLD: 26 SEC — LOW (ref 26.1–36.8)
BASOPHILS # BLD AUTO: 0.02 K/UL — SIGNIFICANT CHANGE UP (ref 0–0.2)
BASOPHILS # BLD MANUAL: 0 K/UL — SIGNIFICANT CHANGE UP (ref 0–0.2)
BASOPHILS NFR BLD AUTO: 0.1 % — SIGNIFICANT CHANGE UP (ref 0–2)
BASOPHILS NFR BLD MANUAL: 0 % — SIGNIFICANT CHANGE UP (ref 0–2)
BLD GP AB SCN SERPL QL: SIGNIFICANT CHANGE UP
BUN SERPL-MCNC: 29.4 MG/DL — HIGH (ref 8–20)
BURR CELLS BLD QL SMEAR: ABNORMAL
CALCIUM SERPL-MCNC: 6.6 MG/DL — LOW (ref 8.4–10.5)
CHLORIDE SERPL-SCNC: 111 MMOL/L — HIGH (ref 96–108)
CO2 SERPL-SCNC: 13 MMOL/L — LOW (ref 22–29)
CREAT SERPL-MCNC: 1.77 MG/DL — HIGH (ref 0.5–1.3)
EGFR: 40 ML/MIN/1.73M2 — LOW
EGFR: 40 ML/MIN/1.73M2 — LOW
EOSINOPHIL # BLD AUTO: 0 K/UL — SIGNIFICANT CHANGE UP (ref 0–0.5)
EOSINOPHIL # BLD MANUAL: 0 K/UL — SIGNIFICANT CHANGE UP (ref 0–0.5)
EOSINOPHIL NFR BLD AUTO: 0 % — SIGNIFICANT CHANGE UP (ref 0–6)
EOSINOPHIL NFR BLD MANUAL: 0 % — SIGNIFICANT CHANGE UP (ref 0–6)
GAS PNL BLDA: SIGNIFICANT CHANGE UP
GLUCOSE SERPL-MCNC: 199 MG/DL — HIGH (ref 70–99)
HCT VFR BLD CALC: 23.6 % — LOW (ref 39–50)
HGB BLD-MCNC: 7.3 G/DL — LOW (ref 13–17)
IMM GRANULOCYTES # BLD AUTO: 0.24 K/UL — HIGH (ref 0–0.07)
IMM GRANULOCYTES NFR BLD AUTO: 1 % — HIGH (ref 0–0.9)
INR BLD: 1.5 RATIO — HIGH (ref 0.85–1.16)
LACTATE SERPL-SCNC: 1.7 MMOL/L — SIGNIFICANT CHANGE UP (ref 0.5–2)
LYMPHOCYTES # BLD AUTO: 1.1 K/UL — SIGNIFICANT CHANGE UP (ref 1–3.3)
LYMPHOCYTES # BLD MANUAL: 0.62 K/UL — LOW (ref 1–3.3)
LYMPHOCYTES NFR BLD AUTO: 4.6 % — LOW (ref 13–44)
LYMPHOCYTES NFR BLD MANUAL: 2.6 % — LOW (ref 13–44)
MAGNESIUM SERPL-MCNC: 1.5 MG/DL — LOW (ref 1.8–2.6)
MCHC RBC-ENTMCNC: 29.4 PG — SIGNIFICANT CHANGE UP (ref 27–34)
MCHC RBC-ENTMCNC: 30.9 G/DL — LOW (ref 32–36)
MCV RBC AUTO: 95.2 FL — SIGNIFICANT CHANGE UP (ref 80–100)
MONOCYTES # BLD AUTO: 1.53 K/UL — HIGH (ref 0–0.9)
MONOCYTES # BLD MANUAL: 0.62 K/UL — SIGNIFICANT CHANGE UP (ref 0–0.9)
MONOCYTES NFR BLD AUTO: 6.5 % — SIGNIFICANT CHANGE UP (ref 2–14)
MONOCYTES NFR BLD MANUAL: 2.6 % — SIGNIFICANT CHANGE UP (ref 2–14)
NEUTROPHILS # BLD AUTO: 20.77 K/UL — HIGH (ref 1.8–7.4)
NEUTROPHILS # BLD MANUAL: 22.43 K/UL — HIGH (ref 1.8–7.4)
NEUTROPHILS NFR BLD AUTO: 87.8 % — HIGH (ref 43–77)
NEUTROPHILS NFR BLD MANUAL: 94.8 % — HIGH (ref 43–77)
NRBC # BLD AUTO: 0 K/UL — SIGNIFICANT CHANGE UP (ref 0–0)
NRBC # FLD: 0 K/UL — SIGNIFICANT CHANGE UP (ref 0–0)
NRBC BLD AUTO-RTO: 0 /100 WBCS — SIGNIFICANT CHANGE UP (ref 0–0)
PHOSPHATE SERPL-MCNC: 4.2 MG/DL — SIGNIFICANT CHANGE UP (ref 2.4–4.7)
PLAT MORPH BLD: NORMAL — SIGNIFICANT CHANGE UP
PLATELET # BLD AUTO: 235 K/UL — SIGNIFICANT CHANGE UP (ref 150–400)
PMV BLD: 11.6 FL — SIGNIFICANT CHANGE UP (ref 7–13)
POIKILOCYTOSIS BLD QL AUTO: ABNORMAL
POLYCHROMASIA BLD QL SMEAR: ABNORMAL
POTASSIUM SERPL-MCNC: 4.7 MMOL/L — SIGNIFICANT CHANGE UP (ref 3.5–5.3)
POTASSIUM SERPL-SCNC: 4.7 MMOL/L — SIGNIFICANT CHANGE UP (ref 3.5–5.3)
PROTHROM AB SERPL-ACNC: 16.9 SEC — HIGH (ref 9.9–13.4)
RBC # BLD: 2.48 M/UL — LOW (ref 4.2–5.8)
RBC # FLD: 15.2 % — HIGH (ref 10.3–14.5)
RBC BLD AUTO: ABNORMAL
SODIUM SERPL-SCNC: 141 MMOL/L — SIGNIFICANT CHANGE UP (ref 135–145)
WBC # BLD: 23.66 K/UL — HIGH (ref 3.8–10.5)
WBC # FLD AUTO: 23.66 K/UL — HIGH (ref 3.8–10.5)

## 2025-05-16 PROCEDURE — G0545: CPT

## 2025-05-16 PROCEDURE — 71045 X-RAY EXAM CHEST 1 VIEW: CPT | Mod: 26

## 2025-05-16 PROCEDURE — 99223 1ST HOSP IP/OBS HIGH 75: CPT

## 2025-05-16 RX ORDER — MEROPENEM 1 G/30ML
1000 INJECTION INTRAVENOUS EVERY 12 HOURS
Refills: 0 | Status: DISCONTINUED | OUTPATIENT
Start: 2025-05-16 | End: 2025-05-16

## 2025-05-16 RX ORDER — MEROPENEM 1 G/30ML
1000 INJECTION INTRAVENOUS EVERY 12 HOURS
Refills: 0 | Status: COMPLETED | OUTPATIENT
Start: 2025-05-16 | End: 2025-05-20

## 2025-05-16 RX ORDER — SODIUM CHLORIDE 9 G/1000ML
1000 INJECTION, SOLUTION INTRAVENOUS
Refills: 0 | Status: DISCONTINUED | OUTPATIENT
Start: 2025-05-16 | End: 2025-05-17

## 2025-05-16 RX ORDER — SODIUM CHLORIDE 9 G/1000ML
1000 INJECTION, SOLUTION INTRAVENOUS ONCE
Refills: 0 | Status: COMPLETED | OUTPATIENT
Start: 2025-05-16 | End: 2025-05-16

## 2025-05-16 RX ADMIN — SODIUM CHLORIDE 2000 MILLILITER(S): 9 INJECTION, SOLUTION INTRAVENOUS at 23:23

## 2025-05-16 RX ADMIN — Medication 1 APPLICATION(S): at 23:55

## 2025-05-16 RX ADMIN — SODIUM CHLORIDE 100 MILLILITER(S): 9 INJECTION, SOLUTION INTRAVENOUS at 23:53

## 2025-05-16 NOTE — PATIENT PROFILE ADULT - FLU SEASON?
Faxed last c to number below. Emma Milligan, TC    
Mom is calling for status on request faxed in from patients therapy for last WCC. Please call to advise. Thank you.  
Mom will call back with fax number. KARIS Vasquez    
Mother called back. The fax is, [phone]  
No

## 2025-05-16 NOTE — H&P ADULT - HISTORY OF PRESENT ILLNESS
HPI: 74M transferred from [facility] with Hx of CVA (on home plavix) with residual aphasia and R hemiparesis, HTN, HLD, DM2, CAD s/p PCI. Patient had been admitted for continued hematuria and underwent cysto with clot evacuation yesterday. Today patient was found to be hypotensive and required pressors. Patient was found to have large amount of free air and fluid in abdomen due to bladder perforation and bilateral pneumothorax and pneumomediastinum. Patient had chest tube placed on Right side but was subsequently removed before transferred.     When patient arrived, he was off pressors and hemodynamically stable. WBC 23, hgb 7.3. Lactate 1.6 and is acidotic.       PAST MEDICAL & SURGICAL HISTORY:  HTN (hypertension)      DM (diabetes mellitus)      HLD (hyperlipidemia)      CVA (cerebrovascular accident)      CAD (coronary artery disease)          Home Medications:  allopurinol 100 mg oral tablet: orally once a day (26 Apr 2025 03:37)  carvedilol 25 mg oral tablet: 1 tab(s) orally 2 times a day (26 Apr 2025 03:37)  finasteride 5 mg oral tablet: 1 tab(s) orally once a day (28 Apr 2025 13:49)  levothyroxine 50 mcg (0.05 mg) oral tablet: 1 tab(s) orally once a day (26 Apr 2025 03:39)  lisinopril 5 mg oral tablet: 1 tab(s) orally once a day (26 Apr 2025 04:52)  MetFORMIN (Eqv-Glucophage XR) 500 mg oral tablet, extended release: 1 tab(s) orally once a day (26 Apr 2025 03:37)  Plavix 75 mg oral tablet: 1 tab(s) orally once a day (26 Apr 2025 03:39)  polyethylene glycol 3350 oral powder for reconstitution: 17 gram(s) orally once a day (28 Apr 2025 13:49)  simvastatin 40 mg oral tablet: 1 tab(s) orally once a day (at bedtime) (26 Apr 2025 04:53)      Review of Systems: All negative except where noted in HPI    MEDICATIONS  (STANDING):  chlorhexidine 2% Cloths 1 Application(s) Topical daily  lactated ringers. 1000 milliLiter(s) (100 mL/Hr) IV Continuous <Continuous>  meropenem Injectable 1000 milliGRAM(s) IV Push every 12 hours    MEDICATIONS  (PRN):      SOCIAL HISTORY:      Vital Signs Last 24 Hrs  T(C): 37.2 (16 May 2025 23:34), Max: 37.2 (16 May 2025 23:34)  T(F): 98.9 (16 May 2025 23:34), Max: 98.9 (16 May 2025 23:34)  HR: 107 (16 May 2025 23:00) (107 - 108)  BP: 134/102 (16 May 2025 23:00) (134/102 - 137/79)  BP(mean): 113 (16 May 2025 23:00) (98 - 113)  RR: 27 (16 May 2025 23:00) (25 - 27)  SpO2: 92% (16 May 2025 23:00) (92% - 99%)    Parameters below as of 16 May 2025 22:48  Patient On (Oxygen Delivery Method): mask, nonrebreather  O2 Flow (L/min): 15      Physical Exam:    General: NAD  Pulm: Respirations non labored, no accessory muscle use, on high flow  Abdomen: soft, non-distended, mildly tender diffusely  Neuro: Alert and oriented x3, right hemiparesis, expressive aphasia  : stout in place with lobo hematuria    LABS:                        7.3    23.66 )-----------( 235      ( 16 May 2025 22:46 )             23.6     05-16    141  |  111[H]  |  29.4[H]  ----------------------------<  199[H]  4.7   |  13.0[L]  |  1.77[H]    Ca    6.6[L]      16 May 2025 22:46  Phos  4.2     05-16  Mg     1.5     05-16      PT/INR - ( 16 May 2025 22:46 )   PT: 16.9 sec;   INR: 1.50 ratio         PTT - ( 16 May 2025 22:46 )  PTT:26.0 sec  Urinalysis Basic - ( 16 May 2025 22:46 )    Color: x / Appearance: x / SG: x / pH: x  Gluc: 199 mg/dL / Ketone: x  / Bili: x / Urobili: x   Blood: x / Protein: x / Nitrite: x   Leuk Esterase: x / RBC: x / WBC x   Sq Epi: x / Non Sq Epi: x / Bacteria: x        RADIOLOGY & ADDITIONAL STUDIES    Impression and Plan: 74M with significant medical hx transferred with CT findings of air and fluid filled abdomen and bilateral pneumothorax, assumed to be form bladder perforation after cysto and clot evac yesterday.   Plan for OR tonight for ex-lap and bladder repair  Admit to SICU for resuscitation   Plan discussed with Dr. Bey

## 2025-05-16 NOTE — H&P ADULT - NSHPPOADEEPVENOUSTHROMB_GEN_A_CORE
Problem: Adult Inpatient Plan of Care  Goal: Plan of Care Review  Outcome: Ongoing, Progressing  Flowsheets (Taken 3/19/2022 1656)  Progress: improving  Plan of Care Reviewed With: patient  Goal: Patient-Specific Goal (Individualized)  Outcome: Ongoing, Progressing  Goal: Absence of Hospital-Acquired Illness or Injury  Outcome: Ongoing, Progressing  Intervention: Identify and Manage Fall Risk  Recent Flowsheet Documentation  Taken 3/19/2022 1613 by Mare Wong LPN  Safety Promotion/Fall Prevention:   assistive device/personal items within reach   clutter free environment maintained   lighting adjusted   nonskid shoes/slippers when out of bed   room organization consistent   safety round/check completed  Taken 3/19/2022 1415 by Mare Wong LPN  Safety Promotion/Fall Prevention:   assistive device/personal items within reach   clutter free environment maintained   lighting adjusted   nonskid shoes/slippers when out of bed   room organization consistent   safety round/check completed  Taken 3/19/2022 1239 by Mare Wong LPN  Safety Promotion/Fall Prevention:   assistive device/personal items within reach   clutter free environment maintained   lighting adjusted   nonskid shoes/slippers when out of bed   room organization consistent   safety round/check completed  Taken 3/19/2022 1000 by Mare Wong LPN  Safety Promotion/Fall Prevention:   assistive device/personal items within reach   clutter free environment maintained   lighting adjusted   nonskid shoes/slippers when out of bed   room organization consistent   safety round/check completed  Taken 3/19/2022 0820 by Mare Wong LPN  Safety Promotion/Fall Prevention:   assistive device/personal items within reach   clutter free environment maintained   lighting adjusted   nonskid shoes/slippers when out of bed   room organization consistent   safety round/check completed  Intervention: Prevent Skin Injury  Recent Flowsheet Documentation  Taken  3/19/2022 1613 by Mare Wong LPN  Body Position: position changed independently  Skin Protection: adhesive use limited  Taken 3/19/2022 1415 by Mare Wong LPN  Body Position: position changed independently  Taken 3/19/2022 1239 by Mare Wong LPN  Body Position: position changed independently  Skin Protection: adhesive use limited  Taken 3/19/2022 1000 by Mare Wong LPN  Body Position: position changed independently  Taken 3/19/2022 0820 by Mare Wong LPN  Body Position: position changed independently  Intervention: Prevent and Manage VTE (Venous Thromboembolism) Risk  Recent Flowsheet Documentation  Taken 3/19/2022 1613 by Mare Wong LPN  VTE Prevention/Management: patient refused intervention  Taken 3/19/2022 1239 by Mare Wong LPN  VTE Prevention/Management: patient refused intervention  Taken 3/19/2022 0820 by Mare Wong LPN  Activity Management: activity adjusted per tolerance  VTE Prevention/Management:   bilateral   sequential compression devices on  Intervention: Prevent Infection  Recent Flowsheet Documentation  Taken 3/19/2022 1613 by Mare Wong LPN  Infection Prevention:   hand hygiene promoted   personal protective equipment utilized   single patient room provided  Taken 3/19/2022 1415 by Mare Wong LPN  Infection Prevention:   equipment surfaces disinfected   hand hygiene promoted   personal protective equipment utilized   single patient room provided  Taken 3/19/2022 1239 by Mare Wong LPN  Infection Prevention:   hand hygiene promoted   personal protective equipment utilized   single patient room provided  Taken 3/19/2022 1000 by Mare Wong LPN  Infection Prevention:   hand hygiene promoted   personal protective equipment utilized   single patient room provided  Taken 3/19/2022 0820 by Mare Wong LPN  Infection Prevention:   equipment surfaces disinfected   hand hygiene promoted   personal protective equipment utilized    single patient room provided  Goal: Optimal Comfort and Wellbeing  Outcome: Ongoing, Progressing  Intervention: Provide Person-Centered Care  Recent Flowsheet Documentation  Taken 3/19/2022 1613 by Mare Wong LPN  Trust Relationship/Rapport:   care explained   choices provided   questions answered   questions encouraged   thoughts/feelings acknowledged  Taken 3/19/2022 1239 by Mare Wong LPN  Trust Relationship/Rapport:   care explained   choices provided   questions answered   questions encouraged   thoughts/feelings acknowledged  Taken 3/19/2022 0820 by Mare Wong LPN  Trust Relationship/Rapport:   care explained   choices provided   questions answered   questions encouraged   thoughts/feelings acknowledged  Goal: Readiness for Transition of Care  Outcome: Ongoing, Progressing     Problem: Diabetes Comorbidity  Goal: Blood Glucose Level Within Targeted Range  Outcome: Ongoing, Progressing  Intervention: Monitor and Manage Glycemia  Recent Flowsheet Documentation  Taken 3/19/2022 1613 by Mare Wong LPN  Glycemic Management: blood glucose monitored  Taken 3/19/2022 1239 by Mare Wong LPN  Glycemic Management: blood glucose monitored     Problem: Hypertension Comorbidity  Goal: Blood Pressure in Desired Range  Outcome: Ongoing, Progressing  Intervention: Maintain Blood Pressure Management  Recent Flowsheet Documentation  Taken 3/19/2022 1613 by Mare Wong LPN  Syncope Management: position changed slowly  Medication Review/Management: medications reviewed  Taken 3/19/2022 1415 by Mare Wong LPN  Medication Review/Management: medications reviewed  Taken 3/19/2022 1239 by Mare Wong LPN  Medication Review/Management: medications reviewed  Taken 3/19/2022 1000 by Mare Wong LPN  Medication Review/Management: medications reviewed  Taken 3/19/2022 0820 by Mare Wong LPN  Medication Review/Management: medications reviewed   Goal Outcome Evaluation:  Plan of Care  Reviewed With: patient        Progress: improving    Pt remains alert and oriented x 4; Speech clear; able to make wants/needs known; lungs CTA; resp even and unlabored; bs x 4; skin is c/d/I with no impairments noted; Pt noted with headache earlier this shift with PRN Floricet given and positive effects noted; Pt was finally able to receive MRI this shift with PRN Ativan administered prior to MRI; No further complications noted; Pleasant and cooperative mood throughout the shift; will cont to monitor.       no

## 2025-05-16 NOTE — PATIENT PROFILE ADULT - FALL HARM RISK - HARM RISK INTERVENTIONS
Assistance with ambulation/Assistance OOB with selected safe patient handling equipment/Communicate Risk of Fall with Harm to all staff/Discuss with provider need for PT consult/Monitor gait and stability/Provide patient with walking aids - walker, cane, crutches/Reinforce activity limits and safety measures with patient and family/Sit up slowly, dangle for a short time, stand at bedside before walking/Tailored Fall Risk Interventions/Use of alarms - bed, chair and/or voice tab/Visual Cue: Yellow wristband and red socks/Bed in lowest position, wheels locked, appropriate side rails in place/Call bell, personal items and telephone in reach/Instruct patient to call for assistance before getting out of bed or chair/Non-slip footwear when patient is out of bed/[place] to call system/Physically safe environment - no spills, clutter or unnecessary equipment/Purposeful Proactive Rounding/Room/bathroom lighting operational, light cord in reach

## 2025-05-17 ENCOUNTER — TRANSCRIPTION ENCOUNTER (OUTPATIENT)
Age: 75
End: 2025-05-17

## 2025-05-17 ENCOUNTER — RESULT REVIEW (OUTPATIENT)
Age: 75
End: 2025-05-17

## 2025-05-17 LAB
ANION GAP SERPL CALC-SCNC: 14 MMOL/L — SIGNIFICANT CHANGE UP (ref 5–17)
ANION GAP SERPL CALC-SCNC: 15 MMOL/L — SIGNIFICANT CHANGE UP (ref 5–17)
APTT BLD: 24.6 SEC — LOW (ref 26.1–36.8)
BASOPHILS # BLD AUTO: 0.01 K/UL — SIGNIFICANT CHANGE UP (ref 0–0.2)
BASOPHILS # BLD AUTO: 0.03 K/UL — SIGNIFICANT CHANGE UP (ref 0–0.2)
BASOPHILS NFR BLD AUTO: 0.1 % — SIGNIFICANT CHANGE UP (ref 0–2)
BASOPHILS NFR BLD AUTO: 0.2 % — SIGNIFICANT CHANGE UP (ref 0–2)
BUN SERPL-MCNC: 28.4 MG/DL — HIGH (ref 8–20)
BUN SERPL-MCNC: 32.5 MG/DL — HIGH (ref 8–20)
CALCIUM SERPL-MCNC: 6.7 MG/DL — LOW (ref 8.4–10.5)
CALCIUM SERPL-MCNC: 6.8 MG/DL — LOW (ref 8.4–10.5)
CHLORIDE SERPL-SCNC: 109 MMOL/L — HIGH (ref 96–108)
CHLORIDE SERPL-SCNC: 111 MMOL/L — HIGH (ref 96–108)
CO2 SERPL-SCNC: 12 MMOL/L — LOW (ref 22–29)
CO2 SERPL-SCNC: 17 MMOL/L — LOW (ref 22–29)
CREAT SERPL-MCNC: 1.59 MG/DL — HIGH (ref 0.5–1.3)
CREAT SERPL-MCNC: 1.6 MG/DL — HIGH (ref 0.5–1.3)
EGFR: 45 ML/MIN/1.73M2 — LOW
EOSINOPHIL # BLD AUTO: 0 K/UL — SIGNIFICANT CHANGE UP (ref 0–0.5)
EOSINOPHIL # BLD AUTO: 0 K/UL — SIGNIFICANT CHANGE UP (ref 0–0.5)
EOSINOPHIL NFR BLD AUTO: 0 % — SIGNIFICANT CHANGE UP (ref 0–6)
EOSINOPHIL NFR BLD AUTO: 0 % — SIGNIFICANT CHANGE UP (ref 0–6)
FIBRINOGEN PPP-MCNC: 343 MG/DL — SIGNIFICANT CHANGE UP (ref 200–450)
GAS PNL BLDA: SIGNIFICANT CHANGE UP
GLUCOSE BLDC GLUCOMTR-MCNC: 199 MG/DL — HIGH (ref 70–99)
GLUCOSE BLDC GLUCOMTR-MCNC: 227 MG/DL — HIGH (ref 70–99)
GLUCOSE SERPL-MCNC: 193 MG/DL — HIGH (ref 70–99)
GLUCOSE SERPL-MCNC: 225 MG/DL — HIGH (ref 70–99)
HCT VFR BLD CALC: 23.6 % — LOW (ref 39–50)
HCT VFR BLD CALC: 25.7 % — LOW (ref 39–50)
HCT VFR BLD CALC: 27.6 % — LOW (ref 39–50)
HGB BLD-MCNC: 7.5 G/DL — LOW (ref 13–17)
HGB BLD-MCNC: 8.3 G/DL — LOW (ref 13–17)
HGB BLD-MCNC: 8.6 G/DL — LOW (ref 13–17)
IMM GRANULOCYTES # BLD AUTO: 0.1 K/UL — HIGH (ref 0–0.07)
IMM GRANULOCYTES # BLD AUTO: 0.17 K/UL — HIGH (ref 0–0.07)
IMM GRANULOCYTES NFR BLD AUTO: 0.6 % — SIGNIFICANT CHANGE UP (ref 0–0.9)
IMM GRANULOCYTES NFR BLD AUTO: 0.9 % — SIGNIFICANT CHANGE UP (ref 0–0.9)
INR BLD: 1.43 RATIO — HIGH (ref 0.85–1.16)
LYMPHOCYTES # BLD AUTO: 0.89 K/UL — LOW (ref 1–3.3)
LYMPHOCYTES # BLD AUTO: 0.92 K/UL — LOW (ref 1–3.3)
LYMPHOCYTES NFR BLD AUTO: 4.7 % — LOW (ref 13–44)
LYMPHOCYTES NFR BLD AUTO: 5.6 % — LOW (ref 13–44)
MAGNESIUM SERPL-MCNC: 1.5 MG/DL — LOW (ref 1.6–2.6)
MAGNESIUM SERPL-MCNC: 1.6 MG/DL — LOW (ref 1.8–2.6)
MCHC RBC-ENTMCNC: 28.2 PG — SIGNIFICANT CHANGE UP (ref 27–34)
MCHC RBC-ENTMCNC: 28.2 PG — SIGNIFICANT CHANGE UP (ref 27–34)
MCHC RBC-ENTMCNC: 29.1 PG — SIGNIFICANT CHANGE UP (ref 27–34)
MCHC RBC-ENTMCNC: 31.2 G/DL — LOW (ref 32–36)
MCHC RBC-ENTMCNC: 31.8 G/DL — LOW (ref 32–36)
MCHC RBC-ENTMCNC: 32.3 G/DL — SIGNIFICANT CHANGE UP (ref 32–36)
MCV RBC AUTO: 88.7 FL — SIGNIFICANT CHANGE UP (ref 80–100)
MCV RBC AUTO: 90.2 FL — SIGNIFICANT CHANGE UP (ref 80–100)
MCV RBC AUTO: 90.5 FL — SIGNIFICANT CHANGE UP (ref 80–100)
MONOCYTES # BLD AUTO: 1.35 K/UL — HIGH (ref 0–0.9)
MONOCYTES # BLD AUTO: 1.41 K/UL — HIGH (ref 0–0.9)
MONOCYTES NFR BLD AUTO: 7.4 % — SIGNIFICANT CHANGE UP (ref 2–14)
MONOCYTES NFR BLD AUTO: 8.3 % — SIGNIFICANT CHANGE UP (ref 2–14)
MRSA PCR RESULT.: SIGNIFICANT CHANGE UP
NEUTROPHILS # BLD AUTO: 13.93 K/UL — HIGH (ref 1.8–7.4)
NEUTROPHILS # BLD AUTO: 16.57 K/UL — HIGH (ref 1.8–7.4)
NEUTROPHILS NFR BLD AUTO: 85.4 % — HIGH (ref 43–77)
NEUTROPHILS NFR BLD AUTO: 86.8 % — HIGH (ref 43–77)
NRBC # BLD AUTO: 0 K/UL — SIGNIFICANT CHANGE UP (ref 0–0)
NRBC # FLD: 0 K/UL — SIGNIFICANT CHANGE UP (ref 0–0)
NRBC BLD AUTO-RTO: 0 /100 WBCS — SIGNIFICANT CHANGE UP (ref 0–0)
PHOSPHATE SERPL-MCNC: 3.5 MG/DL — SIGNIFICANT CHANGE UP (ref 2.4–4.7)
PHOSPHATE SERPL-MCNC: 4.3 MG/DL — SIGNIFICANT CHANGE UP (ref 2.4–4.7)
PLATELET # BLD AUTO: 200 K/UL — SIGNIFICANT CHANGE UP (ref 150–400)
PLATELET # BLD AUTO: 205 K/UL — SIGNIFICANT CHANGE UP (ref 150–400)
PLATELET # BLD AUTO: 260 K/UL — SIGNIFICANT CHANGE UP (ref 150–400)
PMV BLD: 10.9 FL — SIGNIFICANT CHANGE UP (ref 7–13)
PMV BLD: 11.3 FL — SIGNIFICANT CHANGE UP (ref 7–13)
PMV BLD: 11.5 FL — SIGNIFICANT CHANGE UP (ref 7–13)
POTASSIUM SERPL-MCNC: 4.3 MMOL/L — SIGNIFICANT CHANGE UP (ref 3.5–5.3)
POTASSIUM SERPL-MCNC: 4.7 MMOL/L — SIGNIFICANT CHANGE UP (ref 3.5–5.3)
POTASSIUM SERPL-SCNC: 4.3 MMOL/L — SIGNIFICANT CHANGE UP (ref 3.5–5.3)
POTASSIUM SERPL-SCNC: 4.7 MMOL/L — SIGNIFICANT CHANGE UP (ref 3.5–5.3)
PROTHROM AB SERPL-ACNC: 16.5 SEC — HIGH (ref 9.9–13.4)
RBC # BLD: 2.66 M/UL — LOW (ref 4.2–5.8)
RBC # BLD: 2.85 M/UL — LOW (ref 4.2–5.8)
RBC # BLD: 3.05 M/UL — LOW (ref 4.2–5.8)
RBC # FLD: 18.6 % — HIGH (ref 10.3–14.5)
RBC # FLD: 19.5 % — HIGH (ref 10.3–14.5)
RBC # FLD: 19.9 % — HIGH (ref 10.3–14.5)
S AUREUS DNA NOSE QL NAA+PROBE: SIGNIFICANT CHANGE UP
SODIUM SERPL-SCNC: 138 MMOL/L — SIGNIFICANT CHANGE UP (ref 135–145)
SODIUM SERPL-SCNC: 140 MMOL/L — SIGNIFICANT CHANGE UP (ref 135–145)
TROPONIN T, HIGH SENSITIVITY RESULT: 128 NG/L — HIGH (ref 0–51)
TROPONIN T, HIGH SENSITIVITY RESULT: 225 NG/L — HIGH (ref 0–51)
TROPONIN T, HIGH SENSITIVITY RESULT: 261 NG/L — HIGH (ref 0–51)
TROPONIN T, HIGH SENSITIVITY RESULT: 66 NG/L — HIGH (ref 0–51)
WBC # BLD: 16.31 K/UL — HIGH (ref 3.8–10.5)
WBC # BLD: 19.07 K/UL — HIGH (ref 3.8–10.5)
WBC # BLD: 29.04 K/UL — HIGH (ref 3.8–10.5)
WBC # FLD AUTO: 16.31 K/UL — HIGH (ref 3.8–10.5)
WBC # FLD AUTO: 19.07 K/UL — HIGH (ref 3.8–10.5)
WBC # FLD AUTO: 29.04 K/UL — HIGH (ref 3.8–10.5)

## 2025-05-17 PROCEDURE — 99291 CRITICAL CARE FIRST HOUR: CPT | Mod: 24,57

## 2025-05-17 PROCEDURE — 93308 TTE F-UP OR LMTD: CPT | Mod: 26

## 2025-05-17 PROCEDURE — 99291 CRITICAL CARE FIRST HOUR: CPT

## 2025-05-17 PROCEDURE — 51865 REPAIR OF BLADDER WOUND: CPT | Mod: 62

## 2025-05-17 PROCEDURE — 93010 ELECTROCARDIOGRAM REPORT: CPT

## 2025-05-17 PROCEDURE — 31500 INSERT EMERGENCY AIRWAY: CPT

## 2025-05-17 PROCEDURE — 71045 X-RAY EXAM CHEST 1 VIEW: CPT | Mod: 26

## 2025-05-17 DEVICE — IMPLANTABLE DEVICE: Type: IMPLANTABLE DEVICE | Site: ABDOMINAL | Status: FUNCTIONAL

## 2025-05-17 RX ORDER — FENTANYL CITRATE-0.9 % NACL/PF 100MCG/2ML
1.5 SYRINGE (ML) INTRAVENOUS
Qty: 2500 | Refills: 0 | Status: DISCONTINUED | OUTPATIENT
Start: 2025-05-17 | End: 2025-05-21

## 2025-05-17 RX ORDER — ASPIRIN 325 MG
81 TABLET ORAL DAILY
Refills: 0 | Status: DISCONTINUED | OUTPATIENT
Start: 2025-05-17 | End: 2025-05-27

## 2025-05-17 RX ORDER — PROPOFOL 10 MG/ML
10 INJECTION, EMULSION INTRAVENOUS
Qty: 1000 | Refills: 0 | Status: DISCONTINUED | OUTPATIENT
Start: 2025-05-17 | End: 2025-05-17

## 2025-05-17 RX ORDER — SODIUM CHLORIDE 9 G/1000ML
1000 INJECTION, SOLUTION INTRAVENOUS ONCE
Refills: 0 | Status: COMPLETED | OUTPATIENT
Start: 2025-05-17 | End: 2025-05-17

## 2025-05-17 RX ORDER — KETAMINE HCL IN 0.9 % NACL 50 MG/5 ML
100 SYRINGE (ML) INTRAVENOUS ONCE
Refills: 0 | Status: DISCONTINUED | OUTPATIENT
Start: 2025-05-17 | End: 2025-05-17

## 2025-05-17 RX ORDER — WATER FOR INJECTION,STERILE
1000 VIAL (ML) INJECTION
Refills: 0 | Status: DISCONTINUED | OUTPATIENT
Start: 2025-05-17 | End: 2025-05-17

## 2025-05-17 RX ORDER — HYDROMORPHONE/SOD CHLOR,ISO/PF 2 MG/10 ML
0.5 SYRINGE (ML) INJECTION
Refills: 0 | Status: DISCONTINUED | OUTPATIENT
Start: 2025-05-17 | End: 2025-05-20

## 2025-05-17 RX ORDER — CALCIUM GLUCONATE 20 MG/ML
2 INJECTION, SOLUTION INTRAVENOUS ONCE
Refills: 0 | Status: COMPLETED | OUTPATIENT
Start: 2025-05-17 | End: 2025-05-17

## 2025-05-17 RX ORDER — FENTANYL CITRATE-0.9 % NACL/PF 100MCG/2ML
0.5 SYRINGE (ML) INTRAVENOUS
Qty: 2500 | Refills: 0 | Status: DISCONTINUED | OUTPATIENT
Start: 2025-05-17 | End: 2025-05-17

## 2025-05-17 RX ORDER — NOREPINEPHRINE BITARTRATE 8 MG
0.02 SOLUTION INTRAVENOUS
Qty: 8 | Refills: 0 | Status: DISCONTINUED | OUTPATIENT
Start: 2025-05-17 | End: 2025-05-18

## 2025-05-17 RX ORDER — FENTANYL CITRATE-0.9 % NACL/PF 100MCG/2ML
50 SYRINGE (ML) INTRAVENOUS ONCE
Refills: 0 | Status: DISCONTINUED | OUTPATIENT
Start: 2025-05-17 | End: 2025-05-17

## 2025-05-17 RX ORDER — ACETAMINOPHEN 500 MG/5ML
1000 LIQUID (ML) ORAL EVERY 6 HOURS
Refills: 0 | Status: COMPLETED | OUTPATIENT
Start: 2025-05-17 | End: 2025-05-18

## 2025-05-17 RX ORDER — HEPARIN SODIUM 1000 [USP'U]/ML
5000 INJECTION INTRAVENOUS; SUBCUTANEOUS EVERY 8 HOURS
Refills: 0 | Status: DISCONTINUED | OUTPATIENT
Start: 2025-05-17 | End: 2025-05-22

## 2025-05-17 RX ORDER — MIDAZOLAM IN 0.9 % SOD.CHLORID 1 MG/ML
4 PLASTIC BAG, INJECTION (ML) INTRAVENOUS ONCE
Refills: 0 | Status: DISCONTINUED | OUTPATIENT
Start: 2025-05-17 | End: 2025-05-17

## 2025-05-17 RX ORDER — PROPOFOL 10 MG/ML
20 INJECTION, EMULSION INTRAVENOUS ONCE
Refills: 0 | Status: COMPLETED | OUTPATIENT
Start: 2025-05-17 | End: 2025-05-17

## 2025-05-17 RX ORDER — VASOPRESSIN 20 [USP'U]/ML
0.04 INJECTION INTRAVENOUS
Qty: 40 | Refills: 0 | Status: DISCONTINUED | OUTPATIENT
Start: 2025-05-17 | End: 2025-05-18

## 2025-05-17 RX ORDER — FENTANYL CITRATE-0.9 % NACL/PF 100MCG/2ML
100 SYRINGE (ML) INTRAVENOUS ONCE
Refills: 0 | Status: DISCONTINUED | OUTPATIENT
Start: 2025-05-17 | End: 2025-05-17

## 2025-05-17 RX ORDER — INSULIN LISPRO 100 U/ML
INJECTION, SOLUTION INTRAVENOUS; SUBCUTANEOUS EVERY 6 HOURS
Refills: 0 | Status: DISCONTINUED | OUTPATIENT
Start: 2025-05-17 | End: 2025-05-18

## 2025-05-17 RX ORDER — INSULIN LISPRO 100 U/ML
INJECTION, SOLUTION INTRAVENOUS; SUBCUTANEOUS EVERY 6 HOURS
Refills: 0 | Status: DISCONTINUED | OUTPATIENT
Start: 2025-05-17 | End: 2025-05-17

## 2025-05-17 RX ORDER — MAGNESIUM SULFATE 500 MG/ML
2 SYRINGE (ML) INJECTION
Refills: 0 | Status: COMPLETED | OUTPATIENT
Start: 2025-05-17 | End: 2025-05-18

## 2025-05-17 RX ADMIN — Medication 6.73 MICROGRAM(S)/KG/HR: at 06:05

## 2025-05-17 RX ADMIN — Medication 81 MILLIGRAM(S): at 21:44

## 2025-05-17 RX ADMIN — VASOPRESSIN 6 UNIT(S)/MIN: 20 INJECTION INTRAVENOUS at 11:37

## 2025-05-17 RX ADMIN — Medication 1 APPLICATION(S): at 13:33

## 2025-05-17 RX ADMIN — INSULIN LISPRO 4: 100 INJECTION, SOLUTION INTRAVENOUS; SUBCUTANEOUS at 13:33

## 2025-05-17 RX ADMIN — Medication 0.5 MILLIGRAM(S): at 20:17

## 2025-05-17 RX ADMIN — Medication 26.9 MICROGRAM(S)/KG/HR: at 08:00

## 2025-05-17 RX ADMIN — Medication 26.9 MICROGRAM(S)/KG/HR: at 16:27

## 2025-05-17 RX ADMIN — HEPARIN SODIUM 5000 UNIT(S): 1000 INJECTION INTRAVENOUS; SUBCUTANEOUS at 15:00

## 2025-05-17 RX ADMIN — SODIUM CHLORIDE 1000 MILLILITER(S): 9 INJECTION, SOLUTION INTRAVENOUS at 08:00

## 2025-05-17 RX ADMIN — NOREPINEPHRINE BITARTRATE 5.04 MICROGRAM(S)/KG/MIN: 8 SOLUTION at 08:00

## 2025-05-17 RX ADMIN — Medication 100 MICROGRAM(S): at 06:30

## 2025-05-17 RX ADMIN — Medication 400 MILLIGRAM(S): at 13:35

## 2025-05-17 RX ADMIN — Medication 0.5 MILLIGRAM(S): at 23:48

## 2025-05-17 RX ADMIN — SODIUM CHLORIDE 100 MILLILITER(S): 9 INJECTION, SOLUTION INTRAVENOUS at 06:05

## 2025-05-17 RX ADMIN — MEROPENEM 1000 MILLIGRAM(S): 1 INJECTION INTRAVENOUS at 17:59

## 2025-05-17 RX ADMIN — INSULIN LISPRO 2: 100 INJECTION, SOLUTION INTRAVENOUS; SUBCUTANEOUS at 06:00

## 2025-05-17 RX ADMIN — Medication 25 GRAM(S): at 22:29

## 2025-05-17 RX ADMIN — MEROPENEM 1000 MILLIGRAM(S): 1 INJECTION INTRAVENOUS at 06:04

## 2025-05-17 RX ADMIN — Medication 4 MILLIGRAM(S): at 01:35

## 2025-05-17 RX ADMIN — Medication 400 MILLIGRAM(S): at 06:04

## 2025-05-17 RX ADMIN — Medication 15 MILLILITER(S): at 18:00

## 2025-05-17 RX ADMIN — HEPARIN SODIUM 5000 UNIT(S): 1000 INJECTION INTRAVENOUS; SUBCUTANEOUS at 21:44

## 2025-05-17 RX ADMIN — Medication 15 MILLILITER(S): at 06:06

## 2025-05-17 RX ADMIN — Medication 100 MICROGRAM(S): at 06:04

## 2025-05-17 RX ADMIN — Medication 0.5 MILLIGRAM(S): at 23:19

## 2025-05-17 RX ADMIN — INSULIN LISPRO 4: 100 INJECTION, SOLUTION INTRAVENOUS; SUBCUTANEOUS at 18:00

## 2025-05-17 RX ADMIN — Medication 1000 MILLIGRAM(S): at 06:30

## 2025-05-17 RX ADMIN — CALCIUM GLUCONATE 200 GRAM(S): 20 INJECTION, SOLUTION INTRAVENOUS at 22:28

## 2025-05-17 RX ADMIN — Medication 0.5 MILLIGRAM(S): at 20:36

## 2025-05-17 RX ADMIN — Medication 0.5 MILLIGRAM(S): at 15:43

## 2025-05-17 RX ADMIN — PROPOFOL 20 MILLIGRAM(S): 10 INJECTION, EMULSION INTRAVENOUS at 01:36

## 2025-05-17 RX ADMIN — Medication 0.5 MILLIGRAM(S): at 16:00

## 2025-05-17 RX ADMIN — Medication 400 MILLIGRAM(S): at 17:59

## 2025-05-17 RX ADMIN — Medication 100 MILLIGRAM(S): at 08:57

## 2025-05-17 RX ADMIN — Medication 75 MILLILITER(S): at 08:54

## 2025-05-17 NOTE — BRIEF OPERATIVE NOTE - OPERATION/FINDINGS
2 holes found on anterior bladder, closed in 2 layers with chromic and vicryl. Bladder integrity tested with saline/methylene blue infusion into bladder via 24Fr 3 way stout placed pre-op. 18Fr malencot placed for SPT. Alex drain placed in RLQ. Fascia closed with 1 PDS.  2 holes found on anterior bladder, closed in 2 layers with chromic and vicryl. Bladder integrity tested with saline/methylene blue infusion into bladder via 24Fr 3 way stout placed pre-op. 18Fr malencot placed for SPT. Alex drain placed in RLQ. Fascia closed with #1 PDS in running fashion.

## 2025-05-17 NOTE — PROGRESS NOTE ADULT - SUBJECTIVE AND OBJECTIVE BOX
Subjective: Patient was seen and examined at bedside with Dr Bey.           MEDICATIONS  (STANDING):  acetaminophen   IVPB .. 1000 milliGRAM(s) IV Intermittent every 6 hours  chlorhexidine 0.12% Liquid 15 milliLiter(s) Oral Mucosa every 12 hours  chlorhexidine 2% Cloths 1 Application(s) Topical daily  fentaNYL   Infusion 2 MICROgram(s)/kG/Hr (26.9 mL/Hr) IV Continuous <Continuous>  heparin   Injectable 5000 Unit(s) SubCutaneous every 8 hours  insulin lispro (ADMELOG) corrective regimen sliding scale   SubCutaneous every 6 hours  meropenem Injectable 1000 milliGRAM(s) IV Push every 12 hours  norepinephrine Infusion 0.02 MICROgram(s)/kG/Min (5.04 mL/Hr) IV Continuous <Continuous>  sterile water 1000 milliLiter(s) (75 mL/Hr) IV Continuous <Continuous>  vasopressin Infusion 0.04 Unit(s)/Min (6 mL/Hr) IV Continuous <Continuous>    MEDICATIONS  (PRN):  HYDROmorphone  Injectable 0.5 milliGRAM(s) IV Push every 3 hours PRN pain, vent dyssynchrony      Vital Signs Last 24 Hrs  T(C): 37.4 (17 May 2025 16:00), Max: 37.4 (17 May 2025 15:45)  T(F): 99.3 (17 May 2025 16:00), Max: 99.3 (17 May 2025 15:45)  HR: 71 (17 May 2025 16:03) (64 - 117)  BP: 91/59 (17 May 2025 16:00) (91/59 - 141/83)  BP(mean): 70 (17 May 2025 16:00) (70 - 113)  RR: 22 (17 May 2025 16:00) (9 - 27)  SpO2: 100% (17 May 2025 16:03) (89% - 100%)    Parameters below as of 17 May 2025 16:00  Patient On (Oxygen Delivery Method): ventilator        Physical Exam:    Constitutional: NAD  HEENT: PERRL, EOMI  Neck: No JVD, FROM without pain  Respiratory: Respirations non-labored, no accessory muscle use  : SPT LLQ capped, stout draining yellow urine, kristy: sero-sang       LABS:                        8.3    29.04 )-----------( 260      ( 17 May 2025 10:15 )             25.7     05-17    138  |  111[H]  |  28.4[H]  ----------------------------<  193[H]  4.7   |  12.0[L]  |  1.60[H]    Ca    6.7[L]      17 May 2025 05:00  Phos  4.3     05-17  Mg     1.5     05-17      PT/INR - ( 17 May 2025 05:00 )   PT: 16.5 sec;   INR: 1.43 ratio         PTT - ( 17 May 2025 05:00 )  PTT:24.6 sec  Urinalysis Basic - ( 17 May 2025 05:00 )    Color: x / Appearance: x / SG: x / pH: x  Gluc: 193 mg/dL / Ketone: x  / Bili: x / Urobili: x   Blood: x / Protein: x / Nitrite: x   Leuk Esterase: x / RBC: x / WBC x   Sq Epi: x / Non Sq Epi: x / Bacteria: x        A: 75 yo male POD#1 s/p ex-lap and anterior bladder repair.     Plan:   - keep 3 way stout in  - keep mallencott for SPT   - monitor kristy OP  - rest of care as per SICU

## 2025-05-17 NOTE — CONSULT NOTE ADULT - NS ATTEND AMEND GEN_ALL_CORE FT
Mr. Smith was transferred from [facility] at the discretion of the Urologists discussing his case. I, as the SICU attending, accepted the patient to the unit for critical care monitoring. As above, he has multiple medical problems and has a complex past history and is now requiring operative exploration for an intraabdominal bladder perforation.  We will continue to metabolically optimize through resuscitation, antibiotic coverage for intraabdominal contamination through bladder perforation.  CXR shows lungs are inflated and there may be a very small ptx at apicies. We made the decision to preoperative intubate (with anesthesiologist) in the SICU and obtain a CXR should positive pressure cause a pneumothorax. That was completed without incident.   I had a discussion with the family (wife and two other members) regarding his ongoing care, outline our critical care plans as well as being available for Dr. Bey should he need assistance in the operating room.   Con't sedation for intubation and pending OR   Monitor BP, Hypertensive prior to sedation, required slight pressor need afterwards  CXR without large ptx but with effusions and atelectasis. Tolerating high flow prior to intubation   -maintain on AC/VC  Morris present from [place], will be changed in OR by urology  Was on meropenem from [place], will continue for now.  Okay for DVT prophylaxis, stopped AC  Central line and arterial line placed    Critical Care Dx    The patient is a critical care patient with life threatening metabolic and respiratory instability in SICU.  I have personally interviewed and examined the patient, reviewed data and laboratory tests/x-rays and all pertinent electronic images.  The SICU team has a constant risk benefit analyzes discussion with the primary team, all consultants, House Staff and PA's on all decisions.   Time involved in performance of separately billable procedures was not counted toward my critical care time. There is no overlap.    I have personally provided 60 minutes of critical care time concurrently with the resident/fellow. This time excludes time spent on separate procedures and time spent teaching. I have reviewed the resident's/PA's documentation and agree with the assessment and plan of care.  I was physically present for the key portions of the evaluation and management (E/M) service provided.        Johnathan Cohen MD FACS  Acute and Critical Care Surgery  Director of Emergency General Surgery @ [facility]  Associate  - General Surgery @ [facility] Mr. Smith was transferred from [facility] at the discretion of the Urologists discussing his case. I, as the SICU attending, accepted the patient to the unit for critical care monitoring. As above, he has multiple medical problems and has a complex past history and is now requiring operative exploration for an intraabdominal bladder perforation.  We will continue to metabolically optimize through resuscitation, antibiotic coverage for intraabdominal contamination through bladder perforation.  CXR shows lungs are inflated and there may be a very small ptx at apicies. We made the decision to preoperative intubate (with anesthesiologist) in the SICU and obtain a CXR should positive pressure cause a pneumothorax. That was completed without incident.   I had a discussion with the family (wife and two other members) regarding his ongoing care, outline our critical care plans as well as being available for Dr. Bey should he need assistance in the operating room.   Con't sedation for intubation and pending OR   Monitor BP, Hypertensive prior to sedation, required slight pressor need afterwards  CXR without large ptx but with effusions and atelectasis. Tolerating high flow prior to intubation   -maintain on AC/VC  Morris present from [place], will be changed in OR by urology  Was on meropenem from [place], will continue for now.  Okay for DVT prophylaxis, stopped AC  Central line and arterial line placed  Unclear if sepsis present currently given all other diagnoses.    5/17 ([id number])  Late Entry  The patient is a critical care patient with life threatening metabolic and respiratory instability in SICU.  I have personally interviewed and examined the patient, reviewed data and laboratory tests/x-rays and all pertinent electronic images.  The SICU team has a constant risk benefit analyzes discussion with the primary team, all consultants, House Staff and PA's on all decisions.   Time involved in performance of separately billable procedures was not counted toward my critical care time. There is no overlap.    I have personally provided 60 minutes of critical care time concurrently with the resident/fellow. This time excludes time spent on separate procedures and time spent teaching. I have reviewed the resident's/PA's documentation and agree with the assessment and plan of care.  I was physically present for the key portions of the evaluation and management (E/M) service provided.        Johnathan Cohen MD [facility]  Acute and Critical Care Surgery  Director of Emergency General Surgery @ [facility]  Associate  - General Surgery @ [facility]

## 2025-05-17 NOTE — BRIEF OPERATIVE NOTE - SECOND ASSIST PARTICIPATION DETAILS - (COMPONENTS OF THE PROCEDURE THAT ASSISTANT PARTICIPATED IN)
Body Location Override (Optional - Billing Will Still Be Based On Selected Body Map Location If Applicable): left chest Detail Level: Detailed Add 36106 Cpt? (Important Note: In 2017 The Use Of 78879 Is Being Tracked By Cms To Determine Future Global Period Reimbursement For Global Periods): no I acted within this role throughout the entirety of the procedure performed by the primary surgeon

## 2025-05-17 NOTE — BRIEF OPERATIVE NOTE - NSICDXBRIEFPROCEDURE_GEN_ALL_CORE_FT
PROCEDURES:  Complex cystorrhaphy for bladder rupture 17-May-2025 04:24:14  Shanita Bentley  Exploratory laparotomy 17-May-2025 04:24:23  Shanita Bentley

## 2025-05-17 NOTE — PROGRESS NOTE ADULT - CRITICAL CARE ATTENDING COMMENT
Patient examined by me at the bedside.   S/p emergent laparotomy with bladder repair overnight.     Afebrile. Hypotensive requiring Levo 0.06. HR low 100s.  Intubated requiring 70% FIO2.   Following commands on Fentanyl drip.   Abdomen non tender, non distended.   RLQ JOSHUA with 190cc serosanguinous Output since OR.   Suprapubic catheter clamped.    WBC 22 --> 19.   Hb 7.3 --> 8.6.   Glucose 199 - 227.   Creatinine 1.7 --> 1.6.  7.1/47/79/16    Current management as follows:   NEURO: Following commands. Fentanyl drip for pain control.   RESP: Hypoxic repository failure secondary to pulmonary edema/fluid overload from CBI in the setting of bladder rupture.   ---Minimize IV fluids. Diurese as tolerated once off pressors.   CVS: Septic shock secondary to bladder rupture. Continue Levo 0.06 and wean as tolerated.   ---Hx of CAD s/p PCI. Obtain collateral info RE timing and holding anti platelets.   GI: NPO/OGT to LCWS.  : DAYDAY in setting of bladder rupture/CBI. Creatinine downtrending. UOP 3- - 125cc/hr since OR.   ---DC IVF in setting of fluid overload.   HEME: No evidence of ongoing bleeding. Start DVT ppx tomorrow once cleared with urology.   ID: Afebrile. Leukocytosis to 19 but downtrending. Continue Meropenem.   ENDO: Hypoerglycmic. Titrate up sliding scale. Patient examined by me at the bedside.   Transferred from [place] with bladder rupture while undergoing CBI for hematurea.   S/p emergent laparotomy with bladder repair overnight.     Afebrile. Hypotensive requiring Levo 0.06. HR low 100s.  Intubated requiring 70% FIO2.   Following commands on Fentanyl drip.   Abdomen non tender, non distended.   RLQ JOSHUA with 190cc serosanguinous Output since OR.   Suprapubic catheter clamped.    WBC 22 --> 19.   Hb 7.3 --> 8.6.   Glucose 199 - 227.   Creatinine 1.7 --> 1.6.  7.1/47/79/16    Current management as follows:   NEURO: Following commands. Fentanyl drip for pain control.   RESP: Hypoxic repository failure secondary to pulmonary edema/fluid overload from CBI in the setting of bladder rupture.   --- balloon not gholding air. Tube exchanged on AM rounds. F/up repeat CXR.   ---This AM dx consistent with severe pul edema/fluid OL. DC IVF. Diurese as tolerated once off pressors.   CVS: Septic shock secondary to bladder rupture. Continue Levo 0.06 and wean as tolerated.   ---Hx of CAD s/p PCI, unknown date. Will obtain collateral info from wife RE stents.   ---Anti platelets currently held. F/up  regarding when safe to resume.   GI: NPO/OGT to LCWS.  : DAYDAY in setting of bladder rupture/CBI. Creatinine downtrending. UOP 30 - 125cc/hr since OR.   ---DC IVF in setting of fluid overload.   HEME: No evidence of ongoing bleeding but remains anemic. F/up  when safe to start DVT ppx.  ID: Afebrile. Leukocytosis to 19 but downtrending. Continue Meropenem.   ENDO: Hyperglycemic. Titrate up sliding scale. Patient examined by me at the bedside.   Transferred from [place] with bladder rupture while undergoing CBI for hematurea.   S/p emergent laparotomy with bladder repair overnight.     Afebrile. Hypotensive requiring Levo 0.06. HR low 100s.  Intubated requiring 70% FIO2.   Following commands on Fentanyl drip.   Abdomen non tender, non distended.   RLQ JOSHUA with 190cc serosanguinous Output since OR.   Suprapubic catheter clamped.    WBC 22 --> 19.   Hb 7.3 --> 8.6.   Glucose 199 - 227.   Creatinine 1.7 --> 1.6.  7.1/47/79/16    Current management as follows:   NEURO: Following commands. Fentanyl drip for pain control.   RESP: Hypoxic repository failure secondary to pulmonary edema/fluid overload from CBI in the setting of bladder rupture.   --- balloon not gholding air. Tube exchanged on AM rounds. F/up repeat CXR.   ---This AM dx consistent with severe pul edema/fluid OL. DC IVF. Diurese as tolerated once off pressors.   CVS: Septic shock secondary to bladder rupture vs vasoplegia(was not on pressors prior to intubation).   ---Continue Levo 0.06 and wean as tolerated.   ---Hx of CAD s/p PCI, unknown date. Will obtain collateral info from wife RE stents.   ---F/up ECHO   ---Anti platelets currently held. F/up  regarding when safe to resume.   GI: NPO/OGT to LCWS.  : DAYDAY in setting of bladder rupture/CBI. Creatinine downtrending. UOP 30 - 125cc/hr since OR.   ---Minimize IVF in setting of fluid overload.  ---Metabolic acidosis with Bicarb 12. DAYDAY vs sepsis. Start Bicarb drip at 75.   HEME: No evidence of ongoing bleeding but remains anemic. F/up  when safe to start DVT ppx.  ID: Afebrile. Leukocytosis to 19 but downtrending. Continue Meropenem.   ENDO: Hyperglycemic. Titrate up sliding scale. Patient examined by me at the bedside.   Transferred from [place] with bladder rupture while undergoing CBI for hematurea.   S/p emergent laparotomy with bladder repair overnight.     Afebrile. Hypotensive requiring Levo 0.06. HR low 100s.  Intubated requiring 70% FIO2.   Following commands on Fentanyl drip.   Abdomen non tender, non distended.   RLQ JOSHUA with 190cc serosanguinous Output since OR.   Suprapubic catheter clamped.    WBC 22 --> 19.   Hb 7.3 --> 8.6.   Glucose 199 - 227.   Creatinine 1.7 --> 1.6.  7.1/47/79/16    Current management as follows:   NEURO: Following commands. Fentanyl drip for pain control.   RESP: Hypoxic repository failure secondary to pulmonary edema/fluid overload from CBI in the setting of bladder rupture.   --- balloon not gholding air. Tube exchanged on AM rounds. F/up repeat CXR.   ---This AM dx consistent with severe pul edema/fluid OL. DC IVF. Diurese as tolerated once off pressors.   CVS: Septic shock secondary to bladder rupture vs vasoplegia(was not on pressors prior to intubation).   ---Continue Levo 0.06 and wean as tolerated.   ---Hx of CAD s/p PCI, unknown date. Will obtain collateral info from wife RE stents.   ---F/up ECHO   ---Anti platelets currently held. F/up  regarding when safe to resume.   GI: NPO/OGT to LCWS.  : DAYDAY in setting of bladder rupture/CBI. Creatinine downtrending. UOP 30 - 125cc/hr since OR.   ---Minimize IVF in setting of fluid overload.  ---Metabolic acidosis with Bicarb 12. DAYDAY vs sepsis. Start Bicarb drip at 75.   HEME: No evidence of ongoing bleeding but remains anemic. F/up  when safe to start DVT ppx.  ID: Afebrile. Leukocytosis to 19 but downtrending. Continue Meropenem day 1/7.   ENDO: Hyperglycemic. Titrate up sliding scale.

## 2025-05-18 DIAGNOSIS — R79.89 OTHER SPECIFIED ABNORMAL FINDINGS OF BLOOD CHEMISTRY: ICD-10-CM

## 2025-05-18 LAB
ACETONE SERPL-MCNC: ABNORMAL
ALBUMIN SERPL ELPH-MCNC: 2.4 G/DL — LOW (ref 3.3–5.2)
ALBUMIN SERPL ELPH-MCNC: 2.6 G/DL — LOW (ref 3.3–5.2)
ALP SERPL-CCNC: 62 U/L — SIGNIFICANT CHANGE UP (ref 40–120)
ALP SERPL-CCNC: 73 U/L — SIGNIFICANT CHANGE UP (ref 40–120)
ALT FLD-CCNC: 51 U/L — HIGH
ALT FLD-CCNC: 53 U/L — HIGH
ANION GAP SERPL CALC-SCNC: 15 MMOL/L — SIGNIFICANT CHANGE UP (ref 5–17)
ANION GAP SERPL CALC-SCNC: 15 MMOL/L — SIGNIFICANT CHANGE UP (ref 5–17)
AST SERPL-CCNC: 27 U/L — SIGNIFICANT CHANGE UP
AST SERPL-CCNC: 30 U/L — SIGNIFICANT CHANGE UP
BASOPHILS # BLD AUTO: 0.01 K/UL — SIGNIFICANT CHANGE UP (ref 0–0.2)
BASOPHILS NFR BLD AUTO: 0.1 % — SIGNIFICANT CHANGE UP (ref 0–2)
BILIRUB DIRECT SERPL-MCNC: 0.2 MG/DL — SIGNIFICANT CHANGE UP (ref 0–0.3)
BILIRUB INDIRECT FLD-MCNC: 0.4 MG/DL — SIGNIFICANT CHANGE UP (ref 0.2–1)
BILIRUB SERPL-MCNC: 0.5 MG/DL — SIGNIFICANT CHANGE UP (ref 0.4–2)
BILIRUB SERPL-MCNC: 0.6 MG/DL — SIGNIFICANT CHANGE UP (ref 0.4–2)
BUN SERPL-MCNC: 30.1 MG/DL — HIGH (ref 8–20)
BUN SERPL-MCNC: 31.4 MG/DL — HIGH (ref 8–20)
CALCIUM SERPL-MCNC: 7.2 MG/DL — LOW (ref 8.4–10.5)
CALCIUM SERPL-MCNC: 7.2 MG/DL — LOW (ref 8.4–10.5)
CHLORIDE SERPL-SCNC: 110 MMOL/L — HIGH (ref 96–108)
CHLORIDE SERPL-SCNC: 111 MMOL/L — HIGH (ref 96–108)
CO2 SERPL-SCNC: 16 MMOL/L — LOW (ref 22–29)
CO2 SERPL-SCNC: 17 MMOL/L — LOW (ref 22–29)
CREAT FLD-MCNC: 1.61 MG/DL — SIGNIFICANT CHANGE UP
CREAT SERPL-MCNC: 1.17 MG/DL — SIGNIFICANT CHANGE UP (ref 0.5–1.3)
CREAT SERPL-MCNC: 1.34 MG/DL — HIGH (ref 0.5–1.3)
EGFR: 55 ML/MIN/1.73M2 — LOW
EGFR: 55 ML/MIN/1.73M2 — LOW
EGFR: 65 ML/MIN/1.73M2 — SIGNIFICANT CHANGE UP
EGFR: 65 ML/MIN/1.73M2 — SIGNIFICANT CHANGE UP
EOSINOPHIL # BLD AUTO: 0.01 K/UL — SIGNIFICANT CHANGE UP (ref 0–0.5)
EOSINOPHIL NFR BLD AUTO: 0.1 % — SIGNIFICANT CHANGE UP (ref 0–6)
GAS PNL BLDA: SIGNIFICANT CHANGE UP
GAS PNL BLDA: SIGNIFICANT CHANGE UP
GLUCOSE BLDC GLUCOMTR-MCNC: 206 MG/DL — HIGH (ref 70–99)
GLUCOSE BLDC GLUCOMTR-MCNC: 220 MG/DL — HIGH (ref 70–99)
GLUCOSE BLDC GLUCOMTR-MCNC: 222 MG/DL — HIGH (ref 70–99)
GLUCOSE BLDC GLUCOMTR-MCNC: 237 MG/DL — HIGH (ref 70–99)
GLUCOSE BLDC GLUCOMTR-MCNC: 246 MG/DL — HIGH (ref 70–99)
GLUCOSE SERPL-MCNC: 217 MG/DL — HIGH (ref 70–99)
GLUCOSE SERPL-MCNC: 220 MG/DL — HIGH (ref 70–99)
HCT VFR BLD CALC: 25 % — LOW (ref 39–50)
HGB BLD-MCNC: 8 G/DL — LOW (ref 13–17)
IMM GRANULOCYTES # BLD AUTO: 0.07 K/UL — SIGNIFICANT CHANGE UP (ref 0–0.07)
IMM GRANULOCYTES NFR BLD AUTO: 0.5 % — SIGNIFICANT CHANGE UP (ref 0–0.9)
LYMPHOCYTES # BLD AUTO: 1.05 K/UL — SIGNIFICANT CHANGE UP (ref 1–3.3)
LYMPHOCYTES NFR BLD AUTO: 7.9 % — LOW (ref 13–44)
MAGNESIUM SERPL-MCNC: 2.2 MG/DL — SIGNIFICANT CHANGE UP (ref 1.6–2.6)
MCHC RBC-ENTMCNC: 28.4 PG — SIGNIFICANT CHANGE UP (ref 27–34)
MCHC RBC-ENTMCNC: 32 G/DL — SIGNIFICANT CHANGE UP (ref 32–36)
MCV RBC AUTO: 88.7 FL — SIGNIFICANT CHANGE UP (ref 80–100)
MONOCYTES # BLD AUTO: 1.19 K/UL — HIGH (ref 0–0.9)
MONOCYTES NFR BLD AUTO: 9 % — SIGNIFICANT CHANGE UP (ref 2–14)
NEUTROPHILS # BLD AUTO: 10.89 K/UL — HIGH (ref 1.8–7.4)
NEUTROPHILS NFR BLD AUTO: 82.4 % — HIGH (ref 43–77)
NRBC # BLD AUTO: 0 K/UL — SIGNIFICANT CHANGE UP (ref 0–0)
NRBC # FLD: 0 K/UL — SIGNIFICANT CHANGE UP (ref 0–0)
NRBC BLD AUTO-RTO: 0 /100 WBCS — SIGNIFICANT CHANGE UP (ref 0–0)
PHOSPHATE SERPL-MCNC: 2.8 MG/DL — SIGNIFICANT CHANGE UP (ref 2.4–4.7)
PLATELET # BLD AUTO: 162 K/UL — SIGNIFICANT CHANGE UP (ref 150–400)
PMV BLD: 11 FL — SIGNIFICANT CHANGE UP (ref 7–13)
POTASSIUM SERPL-MCNC: 4.1 MMOL/L — SIGNIFICANT CHANGE UP (ref 3.5–5.3)
POTASSIUM SERPL-MCNC: 4.2 MMOL/L — SIGNIFICANT CHANGE UP (ref 3.5–5.3)
POTASSIUM SERPL-SCNC: 4.1 MMOL/L — SIGNIFICANT CHANGE UP (ref 3.5–5.3)
POTASSIUM SERPL-SCNC: 4.2 MMOL/L — SIGNIFICANT CHANGE UP (ref 3.5–5.3)
PROT SERPL-MCNC: 4.3 G/DL — LOW (ref 6.6–8.7)
PROT SERPL-MCNC: 4.7 G/DL — LOW (ref 6.6–8.7)
RBC # BLD: 2.82 M/UL — LOW (ref 4.2–5.8)
RBC # FLD: 18.8 % — HIGH (ref 10.3–14.5)
SODIUM SERPL-SCNC: 141 MMOL/L — SIGNIFICANT CHANGE UP (ref 135–145)
SODIUM SERPL-SCNC: 142 MMOL/L — SIGNIFICANT CHANGE UP (ref 135–145)
TROPONIN T, HIGH SENSITIVITY RESULT: 224 NG/L — HIGH (ref 0–51)
TROPONIN T, HIGH SENSITIVITY RESULT: 294 NG/L — HIGH (ref 0–51)
WBC # BLD: 13.22 K/UL — HIGH (ref 3.8–10.5)
WBC # FLD AUTO: 13.22 K/UL — HIGH (ref 3.8–10.5)

## 2025-05-18 PROCEDURE — 99291 CRITICAL CARE FIRST HOUR: CPT

## 2025-05-18 PROCEDURE — 71045 X-RAY EXAM CHEST 1 VIEW: CPT | Mod: 26

## 2025-05-18 PROCEDURE — 93010 ELECTROCARDIOGRAM REPORT: CPT | Mod: 76

## 2025-05-18 RX ORDER — LISINOPRIL 5 MG/1
1 TABLET ORAL
Refills: 0 | DISCHARGE

## 2025-05-18 RX ORDER — METFORMIN HYDROCHLORIDE 850 MG/1
1 TABLET ORAL
Refills: 0 | DISCHARGE

## 2025-05-18 RX ORDER — HYDROCORTISONE 20 MG
50 TABLET ORAL EVERY 6 HOURS
Refills: 0 | Status: DISCONTINUED | OUTPATIENT
Start: 2025-05-18 | End: 2025-05-19

## 2025-05-18 RX ORDER — WATER FOR INJECTION,STERILE
1000 VIAL (ML) INJECTION
Refills: 0 | Status: DISCONTINUED | OUTPATIENT
Start: 2025-05-18 | End: 2025-05-19

## 2025-05-18 RX ORDER — LEVOTHYROXINE SODIUM 300 MCG
1 TABLET ORAL
Refills: 0 | DISCHARGE

## 2025-05-18 RX ORDER — INSULIN LISPRO 100 U/ML
INJECTION, SOLUTION INTRAVENOUS; SUBCUTANEOUS EVERY 4 HOURS
Refills: 0 | Status: DISCONTINUED | OUTPATIENT
Start: 2025-05-18 | End: 2025-05-19

## 2025-05-18 RX ORDER — CARVEDILOL 3.12 MG/1
1 TABLET, FILM COATED ORAL
Refills: 0 | DISCHARGE

## 2025-05-18 RX ORDER — CLOPIDOGREL BISULFATE 75 MG/1
1 TABLET, FILM COATED ORAL
Refills: 0 | DISCHARGE

## 2025-05-18 RX ADMIN — Medication 25 GRAM(S): at 00:55

## 2025-05-18 RX ADMIN — Medication 0.5 MILLIGRAM(S): at 12:31

## 2025-05-18 RX ADMIN — INSULIN LISPRO 4: 100 INJECTION, SOLUTION INTRAVENOUS; SUBCUTANEOUS at 11:06

## 2025-05-18 RX ADMIN — Medication 0.5 MILLIGRAM(S): at 06:08

## 2025-05-18 RX ADMIN — Medication 81 MILLIGRAM(S): at 13:33

## 2025-05-18 RX ADMIN — Medication 0.5 MILLIGRAM(S): at 06:42

## 2025-05-18 RX ADMIN — MEROPENEM 1000 MILLIGRAM(S): 1 INJECTION INTRAVENOUS at 17:27

## 2025-05-18 RX ADMIN — Medication 400 MILLIGRAM(S): at 00:06

## 2025-05-18 RX ADMIN — INSULIN LISPRO 4: 100 INJECTION, SOLUTION INTRAVENOUS; SUBCUTANEOUS at 00:20

## 2025-05-18 RX ADMIN — Medication 15 MILLILITER(S): at 17:27

## 2025-05-18 RX ADMIN — Medication 50 MILLIGRAM(S): at 13:33

## 2025-05-18 RX ADMIN — HEPARIN SODIUM 5000 UNIT(S): 1000 INJECTION INTRAVENOUS; SUBCUTANEOUS at 06:03

## 2025-05-18 RX ADMIN — Medication 15 MILLILITER(S): at 06:04

## 2025-05-18 RX ADMIN — INSULIN LISPRO 4: 100 INJECTION, SOLUTION INTRAVENOUS; SUBCUTANEOUS at 06:03

## 2025-05-18 RX ADMIN — Medication 26.9 MICROGRAM(S)/KG/HR: at 01:12

## 2025-05-18 RX ADMIN — Medication 1 APPLICATION(S): at 11:06

## 2025-05-18 RX ADMIN — Medication 0.5 MILLIGRAM(S): at 12:46

## 2025-05-18 RX ADMIN — Medication 1000 MILLIGRAM(S): at 00:00

## 2025-05-18 RX ADMIN — Medication 50 MILLIGRAM(S): at 17:27

## 2025-05-18 RX ADMIN — MEROPENEM 1000 MILLIGRAM(S): 1 INJECTION INTRAVENOUS at 06:03

## 2025-05-18 RX ADMIN — HEPARIN SODIUM 5000 UNIT(S): 1000 INJECTION INTRAVENOUS; SUBCUTANEOUS at 21:50

## 2025-05-18 RX ADMIN — Medication 26.9 MICROGRAM(S)/KG/HR: at 20:44

## 2025-05-18 RX ADMIN — INSULIN LISPRO 6: 100 INJECTION, SOLUTION INTRAVENOUS; SUBCUTANEOUS at 21:50

## 2025-05-18 RX ADMIN — HEPARIN SODIUM 5000 UNIT(S): 1000 INJECTION INTRAVENOUS; SUBCUTANEOUS at 13:33

## 2025-05-18 RX ADMIN — INSULIN LISPRO 6: 100 INJECTION, SOLUTION INTRAVENOUS; SUBCUTANEOUS at 17:28

## 2025-05-18 RX ADMIN — Medication 26.9 MICROGRAM(S)/KG/HR: at 11:05

## 2025-05-18 RX ADMIN — VASOPRESSIN 6 UNIT(S)/MIN: 20 INJECTION INTRAVENOUS at 00:20

## 2025-05-18 RX ADMIN — Medication 0.5 MILLIGRAM(S): at 23:20

## 2025-05-18 NOTE — DIETITIAN INITIAL EVALUATION ADULT - ENTERAL
(If unable to extubate) Glucerna 1.5 @ 20ml/hr; increase by 10ml every 6 hours as tolerated to goal rate = 65ml/hr (x18 hours) 1170ml/day; 1755kcal, 98gm protein, 889ml free water

## 2025-05-18 NOTE — CONSULT NOTE ADULT - SUBJECTIVE AND OBJECTIVE BOX
[facility]                                              CARDIOLOGY AT [facility]                                                   [address] [address]                                             Telephone: [phone]. Fax:[phone]                                                       CARDIOLOGY CONSULTATION NOTE                                                                                             History obtained by: Patient and medical record  Community Cardiologist:   Carmen   obtained: Yes [  ] No [  ]  Reason for Consultation:   Troponin lead for BIJAL  Available out pt records reviewed: Yes [  ] No [  ]     Chief complaint:    Patient is a 74y old  Male who presents with a chief complaint of Bladder perforation.        HPI:  74M transferred from [facility] with Hx of CVA (on home plavix) with residual aphasia and R hemiparesis, HTN, HLD, DM2, CAD s/p PCI. Patient had been admitted for continued hematuria and underwent cysto with clot evacuation.   Patient was found to be hypotensive and required pressors.  Also, patient was found to have large amount of free air and fluid in abdomen due to bladder perforation and bilateral pneumothorax and pneumomediastinum.   Patient had chest tube placed on Right side but was subsequently removed before transferred.  Patient then Admitted to SICU for resuscitation.  With CT findings of air and fluid filled abdomen and bilateral pneumothorax, assumed to be form bladder perforation after cysto and clot evac; patient underwent a complex cystorrhaphy for bladder rupture on 17-May-2025.   04:24:14/ Exploratory laparotomy 17-May-2025.    A cardiac consult for troponin leak and unable to see wall motion on TTE due to air.  Per wife patient has known CAD and had a LHC in 2019 with Dr. Morales and has 2 blockages.  Per wife 100% blockage of dLCX x 2.  Wife appearing to a reliable historian - will have to get records tomorrow.    Patient is vented and critically ill on pressors, ROS unattainable.        CARDIAC TESTING   ECHO:    STRESS:    CATH:     ELECTROPHYSIOLOGY:     PAST MEDICAL HISTORY  HTN (hypertension)  DM (diabetes mellitus)  HLD (hyperlipidemia)  CVA (cerebrovascular accident)  CAD (coronary artery disease)    PAST SURGICAL HISTORY  Turb    SOCIAL HISTORY:  Denies smoking/alcohol/drugs  CIGARETTES:     ALCOHOL:  DRUGS:    FAMILY HISTORY:  Family History of Cardiovascular Disease:  Yes [  ] No [x  ]  Coronary Artery Disease in first degree relative: Yes [  ] No [x  ]  Sudden Cardiac Death in First degree relative: Yes [  ] No [x  ]    HOME MEDICATIONS:  allopurinol 100 mg oral tablet: orally once a day (26 Apr 2025 03:37)  carvedilol 25 mg oral tablet: 1 tab(s) orally 2 times a day (26 Apr 2025 03:37)  finasteride 5 mg oral tablet: 1 tab(s) orally once a day (28 Apr 2025 13:49)  levothyroxine 50 mcg (0.05 mg) oral tablet: 1 tab(s) orally once a day (26 Apr 2025 03:39)  lisinopril 5 mg oral tablet: 1 tab(s) orally once a day (26 Apr 2025 04:52)  MetFORMIN (Eqv-Glucophage XR) 500 mg oral tablet, extended release: 1 tab(s) orally once a day (26 Apr 2025 03:37)  Plavix 75 mg oral tablet: 1 tab(s) orally once a day (26 Apr 2025 03:39)  polyethylene glycol 3350 oral powder for reconstitution: 17 gram(s) orally once a day (28 Apr 2025 13:49)  simvastatin 40 mg oral tablet: 1 tab(s) orally once a day (at bedtime) (26 Apr 2025 04:53)    CURRENT CARDIAC MEDICATIONS:  norepinephrine Infusion 0.02 MICROgram(s)/kG/Min IV Continuous <Continuous>    CURRENT OTHER MEDICATIONS:  fentaNYL   Infusion 2 MICROgram(s)/kG/Hr (26.9 mL/Hr) IV Continuous <Continuous>  HYDROmorphone  Injectable 0.5 milliGRAM(s) IV Push every 3 hours PRN pain, vent dyssynchrony  aspirin  chewable 81 milliGRAM(s) Oral daily  chlorhexidine 0.12% Liquid 15 milliLiter(s) Oral Mucosa every 12 hours  chlorhexidine 2% Cloths 1 Application(s) Topical daily  heparin   Injectable 5000 Unit(s) SubCutaneous every 8 hours  hydrocortisone sodium succinate Injectable 50 milliGRAM(s) IV Push every 6 hours  insulin lispro (ADMELOG) corrective regimen sliding scale   SubCutaneous every 4 hours  meropenem Injectable 1000 milliGRAM(s) IV Push every 12 hours, Stop order after: 7 Days  vasopressin Infusion 0.04 Unit(s)/Min (6 mL/Hr) IV Continuous <Continuous>    ALLERGIES:   No Known Allergies    REVIEW OF SYMPTOMS:   Unable to attain.      VITAL SIGNS:  T(C): 37.5 (05-18-25 @ 13:45), Max: 37.5 (05-17-25 @ 20:00)  T(F): 99.5 (05-18-25 @ 13:45), Max: 99.5 (05-17-25 @ 20:00)  HR: 65 (05-18-25 @ 13:45) (43 - 91)  BP: 103/60 (05-18-25 @ 13:00) (91/59 - 137/85)  RR: 24 (05-18-25 @ 13:45) (15 - 26)  SpO2: 98% (05-18-25 @ 13:45) (95% - 100%)     05-17 @ 07:01  -  05-18 @ 07:00  --------------------------------------------------------  IN: 3717.6 mL / OUT: 2450 mL / NET: 1267.6 mL  05-18 @ 07:01  -  05-18 @ 15:27  --------------------------------------------------------  IN: 250.3 mL / OUT: 610 mL / NET: -359.7 mL    PHYSICAL EXAM:  Constitutional: Comfortable.  Vented - critically ill.    HEENT: Atraumatic and normocephalic , neck is supple . no JVD. No carotid bruit.  CNS: A&Ox 0, eyes open and follows with eyes, does not follow commands.    Respiratory: CTAB, unlabored   Cardiovascular: RRR normal s1 s2. No murmur. No rubs or gallop.  Gastrointestinal: Soft, non-tender. +Bowel sounds.   Extremities: + Peripheral Pulses, No clubbing, cyanosis, or edema  Psychiatric: Calm. no agitation.   Skin: Warm and dry, no ulcers on extremities     LABS:                        8.0    13.22 )-----------( 162      ( 18 May 2025 03:48 )             25.0     05-18    141  |  111[H]  |  31.4[H]  ----------------------------<  217[H]  4.1   |  16.0[L]  |  1.34[H]    Ca    7.2[L]      18 May 2025 03:48  Phos  2.8     05-18  Mg     2.2     05-18    TPro  4.3[L]  /  Alb  2.4[L]  /  TBili  0.6  /  DBili  0.2  /  AST  30  /  ALT  53[H]  /  AlkPhos  62  05-18    PT/INR - ( 17 May 2025 05:00 )   PT: 16.5 sec;   INR: 1.43 ratio         PTT - ( 17 May 2025 05:00 )  PTT:24.6 sec  Urinalysis Basic - ( 18 May 2025 03:48 )    Color: x / Appearance: x / SG: x / pH: x  Gluc: 217 mg/dL / Ketone: x  / Bili: x / Urobili: x   Blood: x / Protein: x / Nitrite: x   Leuk Esterase: x / RBC: x / WBC x   Sq Epi: x / Non Sq Epi: x / Bacteria: x    INTERPRETATION OF TELEMETRY:   Sb 40 - 50, SR at times.      ECG:   Ventricular Rate 62 BPM  Atrial Rate 62 BPM  P-R Interval 148 ms  QRS Duration 64 ms  Q-T Interval 460 ms  QTC Calculation(Bazett) 466 ms  P Axis 14 degrees  R Axis 42 degrees  T Axis 44 degrees  Diagnosis Line Normal sinus rhythm  Low voltage QRS  Borderline ECG    Prior ECG: Yes [  ] No [  ]        
HPI: 74M transferred from [facility] with Hx of CVA (on home plavix) with residual aphasia and R hemiparesis, HTN, HLD, DM2, CAD s/p PCI. Patient had been admitted for continued hematuria and underwent cysto with clot evacuation and TURP yesterday. Today patient was found to be hypotensive and required pressors. Patient was found to have large amount of free air and fluid in abdomen due to bladder perforation and bilateral pneumothorax and pneumomediastinum. Patient had chest tube placed on Right side but was subsequently removed before transferred.     SICU Consult: When patient arrived, he was off pressors and hemodynamically stable. WBC 23, hgb 7.3. Lactate 1.6 and is acidotic. Due to the presence of recent bilateral PTX and chest tube bring removed prior to transport, the decision was made to intubate the patient in the SICU prior to transporting to OR. Anesthesia was present and intubated patient with glidescope and 8.0 ETT. Patient was easy to bag and once placed on ventilator, peak pressures were 16. CXR was performed which showed no massive PTX requiring chest tube prior to transport to OR. Patient had hypotension after induction requiring Oseas push and started on Levophed. Patient was taken to the OR w/ Urology and ACS.       MEDICATIONS  (STANDING):  chlorhexidine 2% Cloths 1 Application(s) Topical daily  lactated ringers. 1000 milliLiter(s) (100 mL/Hr) IV Continuous <Continuous>  meropenem Injectable 1000 milliGRAM(s) IV Push every 12 hours  norepinephrine Infusion 0.02 MICROgram(s)/kG/Min (5.04 mL/Hr) IV Continuous <Continuous>    MEDICATIONS  (PRN):      Vital Signs Last 24 Hrs  T(C): 37.2 (16 May 2025 23:34), Max: 37.2 (16 May 2025 23:34)  T(F): 98.9 (16 May 2025 23:34), Max: 98.9 (16 May 2025 23:34)  HR: 109 (17 May 2025 01:45) (102 - 113)  BP: 110/82 (17 May 2025 01:00) (110/82 - 137/79)  BP(mean): 90 (17 May 2025 01:00) (76 - 113)  RR: 0 (17 May 2025 01:45) (0 - 27)  SpO2: 100% (17 May 2025 01:30) (92% - 100%)    Parameters below as of 16 May 2025 22:48  Patient On (Oxygen Delivery Method): mask, nonrebreather  O2 Flow (L/min): 15    Physical Exam:    Constitutional: NAD, residual aphasia and R sided hemiparesis s/p CVA  Respiratory: Intubated, equal bilateral breath sounds  Cardiovascular: Regular rate & rhythm  Gastrointestinal: Soft, mild tenderness to palpation, non-distended  Extremities: No peripheral edema, No cyanosis  : Morris in place w/ gross hematuria      LABS:                        7.3    23.66 )-----------( 235      ( 16 May 2025 22:46 )             23.6     05-16    141  |  111[H]  |  29.4[H]  ----------------------------<  199[H]  4.7   |  13.0[L]  |  1.77[H]    Ca    6.6[L]      16 May 2025 22:46  Phos  4.2     05-16  Mg     1.5     05-16      PT/INR - ( 16 May 2025 22:46 )   PT: 16.9 sec;   INR: 1.50 ratio         PTT - ( 16 May 2025 22:46 )  PTT:26.0 sec  Urinalysis Basic - ( 16 May 2025 22:46 )    Color: x / Appearance: x / SG: x / pH: x  Gluc: 199 mg/dL / Ketone: x  / Bili: x / Urobili: x   Blood: x / Protein: x / Nitrite: x   Leuk Esterase: x / RBC: x / WBC x   Sq Epi: x / Non Sq Epi: x / Bacteria: x

## 2025-05-18 NOTE — PROGRESS NOTE ADULT - ASSESSMENT
NEURO: Following commands. Fentanyl drip for pain control.   RESP: Hypoxic repository failure secondary to pulmonary edema/fluid overload from CBI in the setting of bladder rupture. CXR shows minimal subcutaneous emphysema and no PTX.  CVS: Septic shock secondary to bladder rupture vs vasoplegia(was not on pressors prior to intubation). Weaning down vasopressors. Will obtain EKG for sinus bradycardia.  Will consult cardiology (Dr. Agudelo).  Patient may need BIJAL as TTE is not feasible due to   GI: NPO/OGT to LCWS.  : DAYDAY in setting of bladder rupture/CBI. Creatinine downtrending. UOP 30 - 125cc/hr since OR. Will send JOSHUA creatinine level.  HEME: No evidence of ongoing bleeding but remains anemic. F/up  when safe to start DVT ppx.  ID: Afebrile. Leukocytosis to 19 but downtrending. Continue Meropenem day.  ENDO: Hyperglycemic. Titrate up sliding scale.  Will start stress-dose steroids.    Dispo: Continued SICU Care.

## 2025-05-18 NOTE — CONSULT NOTE ADULT - PROBLEM SELECTOR RECOMMENDATION 9
75 yo male POD#2 s/p ex-lap and anterior bladder repair due to perofrated bladder and air and fluid in abdomen.   Troponin elevated at  66, 1287, 225, 261, and now 294  Unable to evalute for chest pain, as patient is vented, septic and ROS unable to attain.   EKG -  Normal sinus rhythm  Low voltage QRS  Borderline ECG  Continue to trend until peak  TTE:  Technically very limited and difficult image quality nondiagnostic study due to pneumomediastinum air interferences, consider alternative imaging modality.    Unable to evaluate left ventricular ejection fraction.  ICU requesting a BIJAL to evaluate.   Patient has history of 2 blockages untreatable per wife  In am will need to get records and add on for BIJAL if needed  Patient is currently NPO and vented.    Patient is critically ill and under the care of ICU team.  On pressors for septic shock. 75 yo male POD#2 s/p ex-lap and anterior bladder repair due to perofrated bladder and air and fluid in abdomen.   Troponin elevated at  66, 1287, 225, 261, and now 294  Unable to evalute for chest pain, as patient is vented, septic and ROS unable to attain.   EKG -  Normal sinus rhythm  Low voltage QRS  Borderline ECG  Continue to trend until peak  TTE:  Technically very limited and difficult image quality nondiagnostic study due to pneumomediastinum air interferences, consider alternative imaging modality.    Unable to evaluate left ventricular ejection fraction.  Patient has history of 2 blockages untreatable per wife  In am will need to get records and add on for BIJAL if needed  Patient is currently NPO and vented.    Patient is critically ill and under the care of ICU team.  On pressors for septic shock.

## 2025-05-18 NOTE — DIETITIAN INITIAL EVALUATION ADULT - ETIOLOGY
Related to s/p cysto w/ clot evacuation and TURP, intra-peritoneal bladder perf, bilateral PTX and pneumomediastinum requiring intubation

## 2025-05-18 NOTE — PROGRESS NOTE ADULT - SUBJECTIVE AND OBJECTIVE BOX
SICU Attending Progress Note    24H Events:    Patient seen and examined at bedside on SICU rounds.  Patient is mechanically ventilated, on vasopressor support.  JOSHUA drain with voluminous serous output.  Morris catheter with no UOP, urology to reposition Morris.          aspirin  chewable 81 milliGRAM(s) Oral daily  chlorhexidine 0.12% Liquid 15 milliLiter(s) Oral Mucosa every 12 hours  chlorhexidine 2% Cloths 1 Application(s) Topical daily  fentaNYL   Infusion 2 MICROgram(s)/kG/Hr IV Continuous <Continuous>  heparin   Injectable 5000 Unit(s) SubCutaneous every 8 hours  hydrocortisone sodium succinate Injectable 50 milliGRAM(s) IV Push every 6 hours  HYDROmorphone  Injectable 0.5 milliGRAM(s) IV Push every 3 hours PRN  insulin lispro (ADMELOG) corrective regimen sliding scale   SubCutaneous every 4 hours  meropenem Injectable 1000 milliGRAM(s) IV Push every 12 hours  norepinephrine Infusion 0.02 MICROgram(s)/kG/Min IV Continuous <Continuous>  vasopressin Infusion 0.04 Unit(s)/Min IV Continuous <Continuous>    T(C): 37.5 (05-18-25 @ 13:45), Max: 37.5 (05-17-25 @ 20:00)  HR: 44 (05-18-25 @ 15:31) (43 - 91)  BP: 103/60 (05-18-25 @ 13:00) (98/63 - 137/85)  RR: 24 (05-18-25 @ 13:45) (15 - 26)  SpO2: 99% (05-18-25 @ 15:31) (95% - 100%)  Mode: AC/ CMV (Assist Control/ Continuous Mandatory Ventilation), RR (machine): 24, TV (machine): 450, FiO2: 40, PEEP: 8, ITime: 0.9, MAP: 13, PIP: 21    05-17-25 @ 07:01  -  05-18-25 @ 07:00  --------------------------------------------------------  IN: 3717.6 mL / OUT: 2450 mL / NET: 1267.6 mL    05-18-25 @ 07:01  -  05-18-25 @ 16:07  --------------------------------------------------------  IN: 250.3 mL / OUT: 610 mL / NET: -359.7 mL      PHYSICAL EXAM:  GENERAL: NAD, Resting in bed  HEENT:  Head atraumatic, EOMI, PERRLA, conjunctiva and sclera clear; Moist mucous membranes, normal oropharynx  NECK: Supple, No JVD, no lymphadenopathy, no thyroid nodules or enlargement  CHEST/LUNG: Clear to auscultation bilaterally; No rales, rhonchi, wheezing, or rubs. Unlabored respirations on mechanical ventilation.  HEART: Bradycardic; No murmurs, rubs, or gallops  ABDOMEN: Bowel sounds present; Soft, Nontender, Nondistended. No hepatomegally  EXTREMITIES:  2+ Peripheral Pulses, brisk capillary refill. No clubbing, cyanosis, or edema  NERVOUS SYSTEM:  Alert & Oriented X1, non-focal and spontaneous movements of left side. No right-sided movement.  SKIN: No rashes or lesions        Patient required critical care management and is at risk for life threatening decompensation.  I provided __60______of non-continuous care to the patient.

## 2025-05-18 NOTE — DIETITIAN INITIAL EVALUATION ADULT - OTHER INFO
Pt is a 74 year old M transferred from [facility] with Hx of CVA with residual aphasia and R hemiparesis, HTN, HLD, DM2, CAD s/p PCI. Patient admitted for continued hematuria and underwent cysto with clot evacuation and TURP.  Patient was found to be hypotensive and required pressors. Patient was found to have large amount of free air and fluid in abdomen due to bladder perforation and bilateral pneumothorax and pneumomediastinum. Patient had chest tube placed on Right side but was subsequently removed before transferred. Pt remains intubated.

## 2025-05-18 NOTE — CONSULT NOTE ADULT - ASSESSMENT
ASSESSMENT: 74y Male s/p cysto w/ clot evacuation and TURP transferred from [place] 2/2 CT findings concerning for intra-peritoneal bladder perforation along with extensive subcutaneous emphysema, bilateral PTX and pneumomediastinum.     Plan:    Neuro:   - Will evaluate sedation requirements post-op depending if patient is still intubated and HD stability.     CV:   - Lactate 1.6  - Levophed requirement after induction for intubation...will eval requirements post-op  - Hypovolemic upon arrival from outside hospital...received 2L LR bolus prior to going to OR.   - Continue invasive hemodynamic monitoring  - Maintain MAP > 65    Pulm:   - Maintained on HFNC until OR.  - Intubated prior to going to OR w/ AC 16/400/6/100%. Will adjust settings post-op.  - Wean FiO2    GI/Nutrition:   - NPO  - Monitor bowel movements    /Renal:   - Morris for strict I&O  - Monitor kidney fxn  - Monitor UOP    ID:  - Continue Meropenem  - Monitor fever curve  - Trend leukocytosis    Endo:  - Monitor blood glucose  - ISS  - Maintain BGL <180    Skin:   - Repositioning for DTI prevention while in bed    Heme/DVT Prophylaxis:  - SCDs  - Will start chemical ppx once hgb stable and bleeding controlled in OR  - Hgb 7.3 on arrival. Will be transfused in OR.    Lines:  - Arterial Line - Left femoral  - Central Line - TLC in Left femoral  - Peripheral IV's    Dispo:  - Patient remains critically ill  - Patient to go to OR emergently  - Care per SICU post-op
74M transferred from [facility] with Hx of CVA (on home plavix) with residual aphasia and R hemiparesis, HTN, HLD, DM2, CAD s/p PCI. Patient had been admitted for continued hematuria and underwent cysto with clot evacuation.   Patient was found to be hypotensive and required pressors.  Also, patient was found to have large amount of free air and fluid in abdomen due to bladder perforation and bilateral pneumothorax and pneumomediastinum.   Patient had chest tube placed on Right side but was subsequently removed before transferred.  Patient then Admitted to SICU for resuscitation.  With CT findings of air and fluid filled abdomen and bilateral pneumothorax, assumed to be form bladder perforation after cysto and clot evac; patient underwent a complex cystorrhaphy for bladder rupture on 17-May-2025.   04:24:14/ Exploratory laparotomy 17-May-2025.    A cardiac consult for troponin leak and unable to see wall motion on TTE due to air.  Per wife patient has known CAD and had a LHC in 2019 with Dr. Morales and has 2 blockages.  Per wife 100% blockage of dLCX x 2.  Wife appearing to a reliable historian - will have to get records tomorrow.    Patient is vented and critically ill on pressors, ROS unattainable.

## 2025-05-18 NOTE — CHART NOTE - NSCHARTNOTEFT_GEN_A_CORE
Received patient on AC ventilator settings via ETT.  Placed pt on sbt as per orders.  Patient comfortable for 20 minutes, then just had apneic events.  Patient placed back on ac settings.  Notified provider.  Patient tolerating AC settings well thus far.

## 2025-05-18 NOTE — PROGRESS NOTE ADULT - ASSESSMENT
75 yo male POD#2 s/p ex-lap and anterior bladder repair.     Plan:  - Monitor urine output  - SPT tube to remain capped unless stout stops draining urine, then SPT is to be connected to drainage bag by urology team provider  - Monitor kristy drain output  - Continue care as per SICU

## 2025-05-18 NOTE — PROGRESS NOTE ADULT - SUBJECTIVE AND OBJECTIVE BOX
Subjective:     Pt seen and examined at bedside during rounds with the team. No urine output for 2 hours this morning. Stout started draining again after repositioning. No urine output for 1.5 hours later in the day, stout started draining again after an old clot was aspirated with ~200 cc urine output.    Stout: Concentrated yellow urine    Vital Signs Last 24 Hrs  T(C): 37.5 (18 May 2025 13:45), Max: 37.5 (17 May 2025 20:00)  T(F): 99.5 (18 May 2025 13:45), Max: 99.5 (17 May 2025 20:00)  HR: 65 (18 May 2025 13:45) (43 - 91)  BP: 103/60 (18 May 2025 13:00) (91/59 - 137/85)  BP(mean): 73 (18 May 2025 13:00) (70 - 100)  RR: 24 (18 May 2025 13:45) (15 - 26)  SpO2: 98% (18 May 2025 13:45) (95% - 100%)    Parameters below as of 18 May 2025 12:00  Patient On (Oxygen Delivery Method): ventilator      I&O's Detail    17 May 2025 07:01  -  18 May 2025 07:00  --------------------------------------------------------  IN:    FentaNYL: 645.6 mL    IV PiggyBack: 100 mL    IV PiggyBack: 100 mL    IV PiggyBack: 200 mL    Lactated Ringers: 100 mL    Lactated Ringers Bolus: 1000 mL    Norepinephrine: 408 mL    Sterile Water (w/ Additives): 1050 mL    Vasopressin: 114 mL  Total IN: 3717.6 mL    OUT:    Bulb (mL): 725 mL    Indwelling Catheter - Urethral (mL): 1725 mL  Total OUT: 2450 mL    Total NET: 1267.6 mL      18 May 2025 07:01  -  18 May 2025 14:22  --------------------------------------------------------  IN:    FentaNYL: 188.3 mL    Norepinephrine: 20 mL    Vasopressin: 42 mL  Total IN: 250.3 mL    OUT:    Bulb (mL): 400 mL    Indwelling Catheter - Urethral (mL): 210 mL  Total OUT: 610 mL    Total NET: -359.7 mL          Labs:                        8.0    13.22 )-----------( 162      ( 18 May 2025 03:48 )             25.0     05-18    141  |  111[H]  |  31.4[H]  ----------------------------<  217[H]  4.1   |  16.0[L]  |  1.34[H]    Ca    7.2[L]      18 May 2025 03:48  Phos  2.8     05-18  Mg     2.2     05-18    TPro  4.3[L]  /  Alb  2.4[L]  /  TBili  0.6  /  DBili  0.2  /  AST  30  /  ALT  53[H]  /  AlkPhos  62  05-18    PT/INR - ( 17 May 2025 05:00 )   PT: 16.5 sec;   INR: 1.43 ratio         PTT - ( 17 May 2025 05:00 )  PTT:24.6 sec        Physical Exam  Constitutional: patient appears comfortable resting in bed, Intubated  HEENT: head normocephalic and atraumatic  Respiratory: intubated  Cardiovascular: regular rate & rhythm, on pressors  Gastrointestinal: abdomen is soft & non-distended, midline abdominal incision with clean dry dressing intact, JOSHUA with serous output in the bulb  : stout with concentrated yellow urine  Skin: edematous, no diaphoresis, pallor, cyanosis or jaundice

## 2025-05-18 NOTE — DIETITIAN INITIAL EVALUATION ADULT - PERTINENT LABORATORY DATA
05-18    141  |  111[H]  |  31.4[H]  ----------------------------<  217[H]  4.1   |  16.0[L]  |  1.34[H]    Ca    7.2[L]      18 May 2025 03:48  Phos  2.8     05-18  Mg     2.2     05-18    TPro  4.3[L]  /  Alb  2.4[L]  /  TBili  0.6  /  DBili  0.2  /  AST  30  /  ALT  53[H]  /  AlkPhos  62  05-18  POCT Blood Glucose.: 220 mg/dL (05-18-25 @ 10:55)  A1C with Estimated Average Glucose Result: 7.3 % (04-27-25 @ 05:29)

## 2025-05-18 NOTE — CONSULT NOTE ADULT - NS ATTEND AMEND GEN_ALL_CORE FT
74M transferred from [facility] with Hx of CVA (on home plavix) with residual aphasia and R hemiparesis, HTN, HLD, DM2, CAD s/p PCI, initially admitted to [facility] with hematuria s/p cystoscopy with clot evacuation c/b bladder perforation, bilateral pneumothorax, and pneumomediastinum. Currently intubated/sedated, on IV vasopressin. Cardiology consulted due to elevated troponins.  #hsTnT elevated, however trend is flat, likely secondary to demand ischemia. Unable to obtain detailed history due to patient's mental status. EKG with no significant findings. TTE was nondiagnostic in the setting of PTX/pneumomediastinum. Request records from patient's outpatient cardiologist in AM. Can consider BIJAL if further cardiac workup is needed.

## 2025-05-18 NOTE — DIETITIAN INITIAL EVALUATION ADULT - PERTINENT MEDS FT
MEDICATIONS  (STANDING):  insulin lispro (ADMELOG) corrective regimen sliding scale   SubCutaneous every 6 hours  meropenem Injectable 1000 milliGRAM(s) IV Push every 12 hours  norepinephrine Infusion 0.02 MICROgram(s)/kG/Min (5.04 mL/Hr) IV Continuous <Continuous>  vasopressin Infusion 0.04 Unit(s)/Min (6 mL/Hr) IV Continuous <Continuous>

## 2025-05-19 LAB
ALBUMIN SERPL ELPH-MCNC: 2.5 G/DL — LOW (ref 3.3–5.2)
ALP SERPL-CCNC: 72 U/L — SIGNIFICANT CHANGE UP (ref 40–120)
ALT FLD-CCNC: 48 U/L — HIGH
ANION GAP SERPL CALC-SCNC: 12 MMOL/L — SIGNIFICANT CHANGE UP (ref 5–17)
ANION GAP SERPL CALC-SCNC: 13 MMOL/L — SIGNIFICANT CHANGE UP (ref 5–17)
AST SERPL-CCNC: 22 U/L — SIGNIFICANT CHANGE UP
BASOPHILS # BLD AUTO: 0.01 K/UL — SIGNIFICANT CHANGE UP (ref 0–0.2)
BASOPHILS NFR BLD AUTO: 0.1 % — SIGNIFICANT CHANGE UP (ref 0–2)
BILIRUB DIRECT SERPL-MCNC: 0.2 MG/DL — SIGNIFICANT CHANGE UP (ref 0–0.3)
BILIRUB INDIRECT FLD-MCNC: 0.2 MG/DL — SIGNIFICANT CHANGE UP (ref 0.2–1)
BILIRUB SERPL-MCNC: 0.4 MG/DL — SIGNIFICANT CHANGE UP (ref 0.4–2)
BUN SERPL-MCNC: 28.4 MG/DL — HIGH (ref 8–20)
BUN SERPL-MCNC: 31 MG/DL — HIGH (ref 8–20)
CALCIUM SERPL-MCNC: 7.4 MG/DL — LOW (ref 8.4–10.5)
CALCIUM SERPL-MCNC: 7.6 MG/DL — LOW (ref 8.4–10.5)
CHLORIDE SERPL-SCNC: 112 MMOL/L — HIGH (ref 96–108)
CHLORIDE SERPL-SCNC: 112 MMOL/L — HIGH (ref 96–108)
CO2 SERPL-SCNC: 18 MMOL/L — LOW (ref 22–29)
CO2 SERPL-SCNC: 19 MMOL/L — LOW (ref 22–29)
CREAT ?TM UR-MCNC: 79 MG/DL — SIGNIFICANT CHANGE UP
CREAT FLD-MCNC: 1.03 MG/DL — SIGNIFICANT CHANGE UP
CREAT SERPL-MCNC: 0.98 MG/DL — SIGNIFICANT CHANGE UP (ref 0.5–1.3)
CREAT SERPL-MCNC: 1.04 MG/DL — SIGNIFICANT CHANGE UP (ref 0.5–1.3)
EGFR: 75 ML/MIN/1.73M2 — SIGNIFICANT CHANGE UP
EGFR: 75 ML/MIN/1.73M2 — SIGNIFICANT CHANGE UP
EGFR: 80 ML/MIN/1.73M2 — SIGNIFICANT CHANGE UP
EGFR: 80 ML/MIN/1.73M2 — SIGNIFICANT CHANGE UP
EOSINOPHIL # BLD AUTO: 0.01 K/UL — SIGNIFICANT CHANGE UP (ref 0–0.5)
EOSINOPHIL NFR BLD AUTO: 0.1 % — SIGNIFICANT CHANGE UP (ref 0–6)
GAS PNL BLDA: SIGNIFICANT CHANGE UP
GLUCOSE BLDC GLUCOMTR-MCNC: 147 MG/DL — HIGH (ref 70–99)
GLUCOSE BLDC GLUCOMTR-MCNC: 150 MG/DL — HIGH (ref 70–99)
GLUCOSE BLDC GLUCOMTR-MCNC: 151 MG/DL — HIGH (ref 70–99)
GLUCOSE BLDC GLUCOMTR-MCNC: 152 MG/DL — HIGH (ref 70–99)
GLUCOSE BLDC GLUCOMTR-MCNC: 162 MG/DL — HIGH (ref 70–99)
GLUCOSE BLDC GLUCOMTR-MCNC: 176 MG/DL — HIGH (ref 70–99)
GLUCOSE BLDC GLUCOMTR-MCNC: 178 MG/DL — HIGH (ref 70–99)
GLUCOSE BLDC GLUCOMTR-MCNC: 204 MG/DL — HIGH (ref 70–99)
GLUCOSE BLDC GLUCOMTR-MCNC: 216 MG/DL — HIGH (ref 70–99)
GLUCOSE BLDC GLUCOMTR-MCNC: 227 MG/DL — HIGH (ref 70–99)
GLUCOSE BLDC GLUCOMTR-MCNC: 231 MG/DL — HIGH (ref 70–99)
GLUCOSE BLDC GLUCOMTR-MCNC: 232 MG/DL — HIGH (ref 70–99)
GLUCOSE BLDC GLUCOMTR-MCNC: 235 MG/DL — HIGH (ref 70–99)
GLUCOSE SERPL-MCNC: 157 MG/DL — HIGH (ref 70–99)
GLUCOSE SERPL-MCNC: 224 MG/DL — HIGH (ref 70–99)
HCT VFR BLD CALC: 24.9 % — LOW (ref 39–50)
HGB BLD-MCNC: 8.1 G/DL — LOW (ref 13–17)
IMM GRANULOCYTES # BLD AUTO: 0.07 K/UL — SIGNIFICANT CHANGE UP (ref 0–0.07)
IMM GRANULOCYTES NFR BLD AUTO: 0.7 % — SIGNIFICANT CHANGE UP (ref 0–0.9)
LYMPHOCYTES # BLD AUTO: 0.64 K/UL — LOW (ref 1–3.3)
LYMPHOCYTES NFR BLD AUTO: 6.4 % — LOW (ref 13–44)
MAGNESIUM SERPL-MCNC: 2.3 MG/DL — SIGNIFICANT CHANGE UP (ref 1.6–2.6)
MCHC RBC-ENTMCNC: 28.7 PG — SIGNIFICANT CHANGE UP (ref 27–34)
MCHC RBC-ENTMCNC: 32.5 G/DL — SIGNIFICANT CHANGE UP (ref 32–36)
MCV RBC AUTO: 88.3 FL — SIGNIFICANT CHANGE UP (ref 80–100)
MONOCYTES # BLD AUTO: 0.6 K/UL — SIGNIFICANT CHANGE UP (ref 0–0.9)
MONOCYTES NFR BLD AUTO: 6 % — SIGNIFICANT CHANGE UP (ref 2–14)
NEUTROPHILS # BLD AUTO: 8.72 K/UL — HIGH (ref 1.8–7.4)
NEUTROPHILS NFR BLD AUTO: 86.7 % — HIGH (ref 43–77)
NRBC # BLD AUTO: 0 K/UL — SIGNIFICANT CHANGE UP (ref 0–0)
NRBC # FLD: 0 K/UL — SIGNIFICANT CHANGE UP (ref 0–0)
NRBC BLD AUTO-RTO: 0 /100 WBCS — SIGNIFICANT CHANGE UP (ref 0–0)
PHOSPHATE SERPL-MCNC: 2.5 MG/DL — SIGNIFICANT CHANGE UP (ref 2.4–4.7)
PLATELET # BLD AUTO: 141 K/UL — LOW (ref 150–400)
PMV BLD: 10.5 FL — SIGNIFICANT CHANGE UP (ref 7–13)
POTASSIUM SERPL-MCNC: 3.7 MMOL/L — SIGNIFICANT CHANGE UP (ref 3.5–5.3)
POTASSIUM SERPL-MCNC: 3.9 MMOL/L — SIGNIFICANT CHANGE UP (ref 3.5–5.3)
POTASSIUM SERPL-SCNC: 3.7 MMOL/L — SIGNIFICANT CHANGE UP (ref 3.5–5.3)
POTASSIUM SERPL-SCNC: 3.9 MMOL/L — SIGNIFICANT CHANGE UP (ref 3.5–5.3)
PROT SERPL-MCNC: 4.6 G/DL — LOW (ref 6.6–8.7)
RBC # BLD: 2.82 M/UL — LOW (ref 4.2–5.8)
RBC # FLD: 18.4 % — HIGH (ref 10.3–14.5)
SODIUM SERPL-SCNC: 142 MMOL/L — SIGNIFICANT CHANGE UP (ref 135–145)
SODIUM SERPL-SCNC: 144 MMOL/L — SIGNIFICANT CHANGE UP (ref 135–145)
WBC # BLD: 10.05 K/UL — SIGNIFICANT CHANGE UP (ref 3.8–10.5)
WBC # FLD AUTO: 10.05 K/UL — SIGNIFICANT CHANGE UP (ref 3.8–10.5)

## 2025-05-19 PROCEDURE — 71045 X-RAY EXAM CHEST 1 VIEW: CPT | Mod: 26

## 2025-05-19 PROCEDURE — 99291 CRITICAL CARE FIRST HOUR: CPT

## 2025-05-19 PROCEDURE — 99232 SBSQ HOSP IP/OBS MODERATE 35: CPT

## 2025-05-19 RX ORDER — INSULIN LISPRO 100 U/ML
INJECTION, SOLUTION INTRAVENOUS; SUBCUTANEOUS EVERY 6 HOURS
Refills: 0 | Status: DISCONTINUED | OUTPATIENT
Start: 2025-05-19 | End: 2025-05-21

## 2025-05-19 RX ORDER — INSULIN GLARGINE-YFGN 100 [IU]/ML
20 INJECTION, SOLUTION SUBCUTANEOUS AT BEDTIME
Refills: 0 | Status: DISCONTINUED | OUTPATIENT
Start: 2025-05-20 | End: 2025-05-25

## 2025-05-19 RX ORDER — INSULIN GLARGINE-YFGN 100 [IU]/ML
20 INJECTION, SOLUTION SUBCUTANEOUS ONCE
Refills: 0 | Status: COMPLETED | OUTPATIENT
Start: 2025-05-19 | End: 2025-05-19

## 2025-05-19 RX ORDER — PROPOFOL 10 MG/ML
20 INJECTION, EMULSION INTRAVENOUS
Qty: 1000 | Refills: 0 | Status: DISCONTINUED | OUTPATIENT
Start: 2025-05-19 | End: 2025-05-20

## 2025-05-19 RX ORDER — DEXTROSE 50 % IN WATER 50 %
25 SYRINGE (ML) INTRAVENOUS
Refills: 0 | Status: DISCONTINUED | OUTPATIENT
Start: 2025-05-19 | End: 2025-05-21

## 2025-05-19 RX ORDER — POTASSIUM PHOSPHATE, MONOBASIC POTASSIUM PHOSPHATE, DIBASIC INJECTION, 236; 224 MG/ML; MG/ML
15 SOLUTION, CONCENTRATE INTRAVENOUS ONCE
Refills: 0 | Status: COMPLETED | OUTPATIENT
Start: 2025-05-19 | End: 2025-05-19

## 2025-05-19 RX ORDER — DEXTROSE 50 % IN WATER 50 %
50 SYRINGE (ML) INTRAVENOUS
Refills: 0 | Status: DISCONTINUED | OUTPATIENT
Start: 2025-05-19 | End: 2025-05-21

## 2025-05-19 RX ADMIN — Medication 1 APPLICATION(S): at 11:28

## 2025-05-19 RX ADMIN — Medication 0.5 MILLIGRAM(S): at 15:30

## 2025-05-19 RX ADMIN — MEROPENEM 1000 MILLIGRAM(S): 1 INJECTION INTRAVENOUS at 17:39

## 2025-05-19 RX ADMIN — Medication 0.5 MILLIGRAM(S): at 21:55

## 2025-05-19 RX ADMIN — Medication 0.5 MILLIGRAM(S): at 12:27

## 2025-05-19 RX ADMIN — INSULIN LISPRO 2: 100 INJECTION, SOLUTION INTRAVENOUS; SUBCUTANEOUS at 23:32

## 2025-05-19 RX ADMIN — INSULIN LISPRO 6: 100 INJECTION, SOLUTION INTRAVENOUS; SUBCUTANEOUS at 05:09

## 2025-05-19 RX ADMIN — HEPARIN SODIUM 5000 UNIT(S): 1000 INJECTION INTRAVENOUS; SUBCUTANEOUS at 21:37

## 2025-05-19 RX ADMIN — Medication 75 MILLILITER(S): at 05:55

## 2025-05-19 RX ADMIN — Medication 50 MILLIGRAM(S): at 01:01

## 2025-05-19 RX ADMIN — POTASSIUM PHOSPHATE, MONOBASIC POTASSIUM PHOSPHATE, DIBASIC INJECTION, 62.5 MILLIMOLE(S): 236; 224 SOLUTION, CONCENTRATE INTRAVENOUS at 06:31

## 2025-05-19 RX ADMIN — Medication 0.5 MILLIGRAM(S): at 08:40

## 2025-05-19 RX ADMIN — Medication 50 MILLIGRAM(S): at 17:39

## 2025-05-19 RX ADMIN — INSULIN GLARGINE-YFGN 20 UNIT(S): 100 INJECTION, SOLUTION SUBCUTANEOUS at 13:05

## 2025-05-19 RX ADMIN — Medication 81 MILLIGRAM(S): at 11:28

## 2025-05-19 RX ADMIN — Medication 15 MILLILITER(S): at 05:10

## 2025-05-19 RX ADMIN — Medication 0.5 MILLIGRAM(S): at 15:00

## 2025-05-19 RX ADMIN — PROPOFOL 16.1 MICROGRAM(S)/KG/MIN: 10 INJECTION, EMULSION INTRAVENOUS at 21:59

## 2025-05-19 RX ADMIN — Medication 20.2 MICROGRAM(S)/KG/HR: at 21:59

## 2025-05-19 RX ADMIN — Medication 15 MILLILITER(S): at 17:39

## 2025-05-19 RX ADMIN — Medication 0.5 MILLIGRAM(S): at 20:30

## 2025-05-19 RX ADMIN — Medication 50 MILLIGRAM(S): at 05:09

## 2025-05-19 RX ADMIN — Medication 20.2 MICROGRAM(S)/KG/HR: at 11:28

## 2025-05-19 RX ADMIN — Medication 3 UNIT(S)/HR: at 05:55

## 2025-05-19 RX ADMIN — Medication 0.5 MILLIGRAM(S): at 09:10

## 2025-05-19 RX ADMIN — Medication 20.2 MICROGRAM(S)/KG/HR: at 10:50

## 2025-05-19 RX ADMIN — INSULIN LISPRO 1: 100 INJECTION, SOLUTION INTRAVENOUS; SUBCUTANEOUS at 17:40

## 2025-05-19 RX ADMIN — Medication 0.5 MILLIGRAM(S): at 11:57

## 2025-05-19 RX ADMIN — MEROPENEM 1000 MILLIGRAM(S): 1 INJECTION INTRAVENOUS at 05:09

## 2025-05-19 RX ADMIN — Medication 0.5 MILLIGRAM(S): at 03:02

## 2025-05-19 RX ADMIN — HEPARIN SODIUM 5000 UNIT(S): 1000 INJECTION INTRAVENOUS; SUBCUTANEOUS at 05:09

## 2025-05-19 RX ADMIN — Medication 0.5 MILLIGRAM(S): at 00:25

## 2025-05-19 RX ADMIN — INSULIN LISPRO 6: 100 INJECTION, SOLUTION INTRAVENOUS; SUBCUTANEOUS at 03:00

## 2025-05-19 RX ADMIN — Medication 50 MILLIGRAM(S): at 11:28

## 2025-05-19 RX ADMIN — HEPARIN SODIUM 5000 UNIT(S): 1000 INJECTION INTRAVENOUS; SUBCUTANEOUS at 13:05

## 2025-05-19 NOTE — PROGRESS NOTE ADULT - PROBLEM SELECTOR PLAN 1
Likely secondary to demand ischemia. Unable to obtain detailed history due to patient's mental status. EKG with no significant findings. TTE was nondiagnostic in the setting of PTX/pneumomediastinum. Request records from patient's outpatient cardiologist pending  Patient has edema, known CAD as well.    BIJAL today   Troponin elevated at  66, 1287, 225, 261, and now 294, 224.    EKG -  Normal sinus rhythm  TTE:  Technically very limited and difficult image quality nondiagnostic study due to pneumomediastinum air interferences, consider alternative imaging modality.    Unable to evaluate left ventricular ejection fraction.  Patient has history of 2 blockages untreatable per wife  Patient is currently NPO and vented.    Patient is critically ill and under the care of ICU team.  Off pressors

## 2025-05-19 NOTE — PROGRESS NOTE ADULT - SUBJECTIVE AND OBJECTIVE BOX
SICU Attending Progress Note    24H Events:    - no acute events overnight  - weaned off sedation  - weaned off vasopressors  - continues to have asymptomatic bradycardia    aspirin  chewable 81 milliGRAM(s) Oral daily  chlorhexidine 0.12% Liquid 15 milliLiter(s) Oral Mucosa every 12 hours  chlorhexidine 2% Cloths 1 Application(s) Topical daily  dextrose 50% Injectable 50 milliLiter(s) IV Push every 15 minutes  dextrose 50% Injectable 25 milliLiter(s) IV Push every 15 minutes  fentaNYL   Infusion 1.5 MICROgram(s)/kG/Hr IV Continuous <Continuous>  heparin   Injectable 5000 Unit(s) SubCutaneous every 8 hours  hydrocortisone sodium succinate Injectable 50 milliGRAM(s) IV Push every 6 hours  HYDROmorphone  Injectable 0.5 milliGRAM(s) IV Push every 3 hours PRN  insulin lispro (ADMELOG) corrective regimen sliding scale   SubCutaneous every 6 hours  meropenem Injectable 1000 milliGRAM(s) IV Push every 12 hours  sterile water 1000 milliLiter(s) IV Continuous <Continuous>    T(C): 37.2 (05-19-25 @ 13:00), Max: 37.5 (05-18-25 @ 14:00)  HR: 43 (05-19-25 @ 13:00) (38 - 98)  BP: 109/59 (05-19-25 @ 13:00) (103/60 - 176/99)  RR: 24 (05-19-25 @ 13:00) (14 - 25)  SpO2: 100% (05-19-25 @ 13:00) (96% - 100%)  Mode: AC/ CMV (Assist Control/ Continuous Mandatory Ventilation), RR (machine): 24, TV (machine): 450, FiO2: 40, PEEP: 6, ITime: 0.9, MAP: 10, PIP: 16    05-18-25 @ 07:01  -  05-19-25 @ 07:00  --------------------------------------------------------  IN: 985 mL / OUT: 2305 mL / NET: -1320 mL    05-19-25 @ 07:01  -  05-19-25 @ 13:50  --------------------------------------------------------  IN: 908.2 mL / OUT: 630 mL / NET: 278.2 mL        PHYSICAL EXAM:    GENERAL: NAD, Resting in bed  CHEST/LUNG: Clear to auscultation bilaterally; No rales, rhonchi, wheezing, or rubs. Unlabored respirations on mechanical ventilation.  HEART: Bradycardic; No murmurs, rubs, or gallops  ABDOMEN: Bowel sounds present; Soft, Nontender, Nondistended, SBT in place  EXTREMITIES:  2+ Peripheral Pulses, brisk capillary refill. No clubbing, cyanosis, or edema  SKIN: No rashes or lesions      Assessment:    74yMale transferred from [place] with bladder rupture while undergoing CBI for hematurea. S/p emergent laparotomy with bladder repair 5/17/25      Plan:    NEURO: Following commands. Dilaudid for pain control.   RESP: satting well on current vent setting will continue SBT and monitor tolerance.   CVS: off vasopressors, MAPS above 65, Scheduled for BIJAL, remains bradycardic   GI: NPO/OGT to LCWS.  : DAYDAY in setting of bladder rupture/CBI. Creatinine downtrending. high JOSHUA output. Will send repeat JOSHUA creatinine level.  HEME: No evidence of ongoing bleeding but remains anemic. SCDs and HSQ for DVT ppx, continue home ASA  ID: Afebrile. Leukocytosis downtrending. Continue Meropenem day.  ENDO: Hyperglycemic. Titrate up sliding scale.  Continue start stress-dose steroids.    Dispo: Continue SICU care

## 2025-05-19 NOTE — CHART NOTE - NSCHARTNOTEFT_GEN_A_CORE
Peep decreased to 6 cmH20 at this time as per SICU DEANDRA French in preparation for am SBT. RN aware as well. Will cont to monitor

## 2025-05-19 NOTE — PROGRESS NOTE ADULT - ASSESSMENT
74M transferred from [facility] with Hx of CVA (on home plavix) with residual aphasia and R hemiparesis, HTN, HLD, DM2, CAD s/p PCI. Patient had been admitted for continued hematuria and underwent cysto with clot evacuation.   Patient was found to be hypotensive and required pressors.  Also, patient was found to have large amount of free air and fluid in abdomen due to bladder perforation and bilateral pneumothorax and pneumomediastinum.   Patient had chest tube placed on Right side but was subsequently removed before transferred.  Patient then Admitted to SICU for resuscitation.  With CT findings of air and fluid filled abdomen and bilateral pneumothorax, assumed to be form bladder perforation after cysto and clot evac; patient underwent a complex cystorrhaphy for bladder rupture on 17-May-2025.   04:24:14/ Exploratory laparotomy 17-May-2025.    A cardiac consult for troponin leak and unable to see wall motion on TTE due to air.  Per wife patient has known CAD and had a LHC in 2019 with Dr. Morales and has 2 blockages.  Per wife 100% blockage of dLCX x 2.  Wife appearing to a reliable historian - will have to get records tomorrow.    Patient is vented and critically ill on pressors, ROS unattainable.

## 2025-05-19 NOTE — PROGRESS NOTE ADULT - SUBJECTIVE AND OBJECTIVE BOX
Subjective: 74y Male seen at bedside. Pt resting comfortably, responsive to verbal stimuli. Morris catheter draining clear, yellow urine. SP tube capped.     STATUS POST:  Ex-lap and anterior bladder wall repair     POST OPERATIVE DAY #: 2     MEDICATIONS  (STANDING):  aspirin  chewable 81 milliGRAM(s) Oral daily  chlorhexidine 0.12% Liquid 15 milliLiter(s) Oral Mucosa every 12 hours  chlorhexidine 2% Cloths 1 Application(s) Topical daily  dextrose 50% Injectable 50 milliLiter(s) IV Push every 15 minutes  dextrose 50% Injectable 25 milliLiter(s) IV Push every 15 minutes  fentaNYL   Infusion 1.5 MICROgram(s)/kG/Hr (20.2 mL/Hr) IV Continuous <Continuous>  heparin   Injectable 5000 Unit(s) SubCutaneous every 8 hours  hydrocortisone sodium succinate Injectable 50 milliGRAM(s) IV Push every 6 hours  insulin regular Infusion 3 Unit(s)/Hr (3 mL/Hr) IV Continuous <Continuous>  meropenem Injectable 1000 milliGRAM(s) IV Push every 12 hours  sterile water 1000 milliLiter(s) (75 mL/Hr) IV Continuous <Continuous>    MEDICATIONS  (PRN):  HYDROmorphone  Injectable 0.5 milliGRAM(s) IV Push every 3 hours PRN pain, vent dyssynchrony      Vital Signs Last 24 Hrs  T(C): 37.4 (19 May 2025 09:15), Max: 37.5 (18 May 2025 09:45)  T(F): 99.3 (19 May 2025 09:15), Max: 99.5 (18 May 2025 09:45)  HR: 49 (19 May 2025 09:15) (38 - 91)  BP: 162/83 (19 May 2025 09:00) (103/60 - 176/99)  BP(mean): 104 (19 May 2025 09:00) (73 - 122)  RR: 24 (19 May 2025 09:15) (14 - 26)  SpO2: 100% (19 May 2025 09:15) (95% - 100%)    Parameters below as of 19 May 2025 08:00  Patient On (Oxygen Delivery Method): ventilator          05-18  -  05-19  --------------------------------------------------------  IN:    FentaNYL: 396.9 mL    FentaNYL: 161.6 mL    Glucerna 1.5: 40 mL    Insulin: 6 mL    IV PiggyBack: 125 mL    Norepinephrine: 27.5 mL    Sterile Water (w/ Additives): 150 mL    Vasopressin: 78 mL  Total IN: 985 mL    OUT:    Bulb (mL): 500 mL    Indwelling Catheter - Urethral (mL): 1805 mL  Total OUT: 2305 mL    Total NET: -1320 mL      05-19  -  05-19  --------------------------------------------------------  IN:    FentaNYL: 60.6 mL    Insulin: 9 mL    IV PiggyBack: 125 mL    Sterile Water (w/ Additives): 225 mL  Total IN: 419.6 mL    OUT:    Bulb (mL): 75 mL    Indwelling Catheter - Urethral (mL): 200 mL  Total OUT: 275 mL    Total NET: 144.6 mL          PHYSICAL EXAM   General: NAD  Respiratory: Intubated on mechanical ventilation, respirations non-labored; no accessory muscle use  ABD: Soft, non-tender, non-distended. Midline incision covered by prevena dressing. JOSHUA drain in RLQ draining SS fluid adequately. DDI   : Morris catheter draining clear, yellow urine. SP tube capped.   Skin: Warm, dry. No cyanosis     LABS:                        8.1    10.05 )-----------( 141      ( 19 May 2025 02:48 )             24.9     05-19    142  |  112[H]  |  31.0[H]  ----------------------------<  224[H]  3.9   |  18.0[L]  |  1.04    Ca    7.6[L]      19 May 2025 02:48  Phos  2.5     05-19  Mg     2.3     05-19    TPro  4.6[L]  /  Alb  2.5[L]  /  TBili  0.4  /  DBili  0.2  /  AST  22  /  ALT  48[H]  /  AlkPhos  72  05-19      Urinalysis Basic - ( 19 May 2025 02:48 )    Color: x / Appearance: x / SG: x / pH: x  Gluc: 224 mg/dL / Ketone: x  / Bili: x / Urobili: x   Blood: x / Protein: x / Nitrite: x   Leuk Esterase: x / RBC: x / WBC x   Sq Epi: x / Non Sq Epi: x / Bacteria: x          ASSESSMENT:  74y Male     PLAN:  Continue current care  OOB as tolerated  Encourage IS  DVT ppx Subjective: 74y Male seen at bedside. Pt resting comfortably, responsive to verbal stimuli. Stout catheter draining clear, yellow urine. SP tube capped.     STATUS POST:  Ex-lap and anterior bladder wall repair     POST OPERATIVE DAY #: 2     MEDICATIONS  (STANDING):  aspirin  chewable 81 milliGRAM(s) Oral daily  chlorhexidine 0.12% Liquid 15 milliLiter(s) Oral Mucosa every 12 hours  chlorhexidine 2% Cloths 1 Application(s) Topical daily  dextrose 50% Injectable 50 milliLiter(s) IV Push every 15 minutes  dextrose 50% Injectable 25 milliLiter(s) IV Push every 15 minutes  fentaNYL   Infusion 1.5 MICROgram(s)/kG/Hr (20.2 mL/Hr) IV Continuous <Continuous>  heparin   Injectable 5000 Unit(s) SubCutaneous every 8 hours  hydrocortisone sodium succinate Injectable 50 milliGRAM(s) IV Push every 6 hours  insulin regular Infusion 3 Unit(s)/Hr (3 mL/Hr) IV Continuous <Continuous>  meropenem Injectable 1000 milliGRAM(s) IV Push every 12 hours  sterile water 1000 milliLiter(s) (75 mL/Hr) IV Continuous <Continuous>    MEDICATIONS  (PRN):  HYDROmorphone  Injectable 0.5 milliGRAM(s) IV Push every 3 hours PRN pain, vent dyssynchrony      Vital Signs Last 24 Hrs  T(C): 37.4 (19 May 2025 09:15), Max: 37.5 (18 May 2025 09:45)  T(F): 99.3 (19 May 2025 09:15), Max: 99.5 (18 May 2025 09:45)  HR: 49 (19 May 2025 09:15) (38 - 91)  BP: 162/83 (19 May 2025 09:00) (103/60 - 176/99)  BP(mean): 104 (19 May 2025 09:00) (73 - 122)  RR: 24 (19 May 2025 09:15) (14 - 26)  SpO2: 100% (19 May 2025 09:15) (95% - 100%)    Parameters below as of 19 May 2025 08:00  Patient On (Oxygen Delivery Method): ventilator          05-18  -  05-19  --------------------------------------------------------  IN:    FentaNYL: 396.9 mL    FentaNYL: 161.6 mL    Glucerna 1.5: 40 mL    Insulin: 6 mL    IV PiggyBack: 125 mL    Norepinephrine: 27.5 mL    Sterile Water (w/ Additives): 150 mL    Vasopressin: 78 mL  Total IN: 985 mL    OUT:    Bulb (mL): 500 mL    Indwelling Catheter - Urethral (mL): 1805 mL  Total OUT: 2305 mL    Total NET: -1320 mL      05-19  -  05-19  --------------------------------------------------------  IN:    FentaNYL: 60.6 mL    Insulin: 9 mL    IV PiggyBack: 125 mL    Sterile Water (w/ Additives): 225 mL  Total IN: 419.6 mL    OUT:    Bulb (mL): 75 mL    Indwelling Catheter - Urethral (mL): 200 mL  Total OUT: 275 mL    Total NET: 144.6 mL          PHYSICAL EXAM   General: NAD  Respiratory: Intubated on mechanical ventilation, respirations non-labored; no accessory muscle use  ABD: Soft, non-tender, non-distended. Midline incision covered by prevena dressing. JOSHUA drain in RLQ draining SS fluid adequately. DDI   : Stout catheter draining clear, yellow urine. SP tube capped.   Skin: Warm, dry. No cyanosis     LABS:                        8.1    10.05 )-----------( 141      ( 19 May 2025 02:48 )             24.9     05-19    142  |  112[H]  |  31.0[H]  ----------------------------<  224[H]  3.9   |  18.0[L]  |  1.04    Ca    7.6[L]      19 May 2025 02:48  Phos  2.5     05-19  Mg     2.3     05-19    TPro  4.6[L]  /  Alb  2.5[L]  /  TBili  0.4  /  DBili  0.2  /  AST  22  /  ALT  48[H]  /  AlkPhos  72  05-19      Urinalysis Basic - ( 19 May 2025 02:48 )    Color: x / Appearance: x / SG: x / pH: x  Gluc: 224 mg/dL / Ketone: x  / Bili: x / Urobili: x   Blood: x / Protein: x / Nitrite: x   Leuk Esterase: x / RBC: x / WBC x   Sq Epi: x / Non Sq Epi: x / Bacteria: x    ASSESSMENT:  74M with PMH of CVA, CAD s/p PCI, HTN, HLD, DM admitted for anterior bladder wall perforation, b/l pneumothorax, pneumomediastinum s/p cysto and clot evacuation POD #3 at [facility] and ex lap and anterior bladder wall repair POD #2 at [facility]. Patient now off pressors.     PLAN:  - Maintain stout catheter   - Monitor urine output  - Notify urology provider if stout not draining   - SPT tube to remain capped unless stout stops draining urine, then SPT is to be connected to drainage bag by urology team provider  - Monitor kristy drain output  - Continue care as per SICU

## 2025-05-19 NOTE — PROGRESS NOTE ADULT - SUBJECTIVE AND OBJECTIVE BOX
[facility]                                                         CARDIOLOGY AT [facility]                                                                  [address], [place] [address]                                                         Telephone: [phone]. Fax:[phone]                                                                             PROGRESS NOTE    Reason for follow up:   Elevated troponin   Update:   SICU requesting BIJAL today as patient is Intubated, difficult if needs re-intubation or extubation.    Review of symptoms:   Unable to attain.    [facility] Agud[facility]   Vitals:  T(C): 37.2 (05-19-25 @ 10:00), Max: 37.5 (05-18-25 @ 10:15)  HR: 44 (05-19-25 @ 10:00) (38 - 91)  BP: 162/83 (05-19-25 @ 09:00) (103/60 - 176/99)  RR: 24 (05-19-25 @ 10:00) (14 - 26)  SpO2: 100% (05-19-25 @ 10:00) (95% - 100%)  I&O's Summary  18 May 2025 07:01  -  19 May 2025 07:00  --------------------------------------------------------  IN: 985 mL / OUT: 2305 mL / NET: -1320 mL  19 May 2025 07:01  -  19 May 2025 10:12  --------------------------------------------------------  IN: 482.1 mL / OUT: 400 mL / NET: 82.1 mL    Weight (kg): 134.5 (05-16 @ 22:48)    PHYSICAL EXAM:  Appearance: Comfortable. Vented/   HEENT:  Atraumatic. Normocephalic.  Normal oral mucosa  Neurologic: A & O x 0, eyes open seems to understand verbal commands.    Cardiovascular: RRR S1 S2, No murmur, no rubs/gallops. No JVD  Respiratory: Lungs clear to auscultation, unlabored   Gastrointestinal:  Distended  Lower Extremities: + Peripheral Pulses, No clubbing, cyanosis, + generalized edema noted    Psychiatry: Patient is calm. No agitation.   Skin: warm and dry.    CURRENT OTHER MEDICATIONS:  meropenem Injectable 1000 milliGRAM(s) IV Push every 12 hours  fentaNYL   Infusion 1.5 MICROgram(s)/kG/Hr (20.2 mL/Hr) IV Continuous <Continuous>  HYDROmorphone  Injectable 0.5 milliGRAM(s) IV Push every 3 hours PRN pain, vent dyssynchrony  dextrose 50% Injectable 50 milliLiter(s) IV Push every 15 minutes, Stop order after: 1 Doses  dextrose 50% Injectable 25 milliLiter(s) IV Push every 15 minutes, Stop order after: 1 Doses  hydrocortisone sodium succinate Injectable 50 milliGRAM(s) IV Push every 6 hours  insulin regular Infusion 3 Unit(s)/Hr (3 mL/Hr) IV Continuous <Continuous>  aspirin  chewable 81 milliGRAM(s) Oral daily  chlorhexidine 0.12% Liquid 15 milliLiter(s) Oral Mucosa every 12 hours  chlorhexidine 2% Cloths 1 Application(s) Topical daily  heparin   Injectable 5000 Unit(s) SubCutaneous every 8 hours  sterile water 1000 milliLiter(s) (75 mL/Hr) IV Continuous <Continuous>    LABS:	 	                        8.1    10.05 )-----------( 141      ( 19 May 2025 02:48 )             24.9     05-19    142  |  112[H]  |  31.0[H]  ----------------------------<  224[H]  3.9   |  18.0[L]  |  1.04    Ca    7.6[L]      19 May 2025 02:48  Phos  2.5     05-19  Mg     2.3     05-19    TPro  4.6[L]  /  Alb  2.5[L]  /  TBili  0.4  /  DBili  0.2  /  AST  22  /  ALT  48[H]  /  AlkPhos  72  05-19    PT/INR/PTT ( 17 May 2025 05:00 )                       :                       :      16.5         :       24.6                  .        .                   .              .           .       1.43        .       TELEMETRY:   SB 40's, no acute alarms overnight

## 2025-05-20 ENCOUNTER — RESULT REVIEW (OUTPATIENT)
Age: 75
End: 2025-05-20

## 2025-05-20 DIAGNOSIS — I21.4 NON-ST ELEVATION (NSTEMI) MYOCARDIAL INFARCTION: ICD-10-CM

## 2025-05-20 DIAGNOSIS — I50.20 UNSPECIFIED SYSTOLIC (CONGESTIVE) HEART FAILURE: ICD-10-CM

## 2025-05-20 LAB
ALBUMIN SERPL ELPH-MCNC: 2.5 G/DL — LOW (ref 3.3–5.2)
ALBUMIN SERPL ELPH-MCNC: 2.7 G/DL — LOW (ref 3.3–5.2)
ALP SERPL-CCNC: 66 U/L — SIGNIFICANT CHANGE UP (ref 40–120)
ALP SERPL-CCNC: 83 U/L — SIGNIFICANT CHANGE UP (ref 40–120)
ALT FLD-CCNC: 31 U/L — SIGNIFICANT CHANGE UP
ALT FLD-CCNC: 31 U/L — SIGNIFICANT CHANGE UP
ANION GAP SERPL CALC-SCNC: 14 MMOL/L — SIGNIFICANT CHANGE UP (ref 5–17)
ANION GAP SERPL CALC-SCNC: 16 MMOL/L — SIGNIFICANT CHANGE UP (ref 5–17)
AST SERPL-CCNC: 14 U/L — SIGNIFICANT CHANGE UP
AST SERPL-CCNC: 15 U/L — SIGNIFICANT CHANGE UP
BASOPHILS # BLD AUTO: 0 K/UL — SIGNIFICANT CHANGE UP (ref 0–0.2)
BASOPHILS # BLD AUTO: 0.01 K/UL — SIGNIFICANT CHANGE UP (ref 0–0.2)
BASOPHILS NFR BLD AUTO: 0 % — SIGNIFICANT CHANGE UP (ref 0–2)
BASOPHILS NFR BLD AUTO: 0.1 % — SIGNIFICANT CHANGE UP (ref 0–2)
BILIRUB DIRECT SERPL-MCNC: 0.1 MG/DL — SIGNIFICANT CHANGE UP (ref 0–0.3)
BILIRUB DIRECT SERPL-MCNC: 0.2 MG/DL — SIGNIFICANT CHANGE UP (ref 0–0.3)
BILIRUB INDIRECT FLD-MCNC: 0.2 MG/DL — SIGNIFICANT CHANGE UP (ref 0.2–1)
BILIRUB INDIRECT FLD-MCNC: 0.2 MG/DL — SIGNIFICANT CHANGE UP (ref 0.2–1)
BILIRUB SERPL-MCNC: 0.4 MG/DL — SIGNIFICANT CHANGE UP (ref 0.4–2)
BILIRUB SERPL-MCNC: 0.4 MG/DL — SIGNIFICANT CHANGE UP (ref 0.4–2)
BUN SERPL-MCNC: 24.4 MG/DL — HIGH (ref 8–20)
BUN SERPL-MCNC: 25.2 MG/DL — HIGH (ref 8–20)
CALCIUM SERPL-MCNC: 7.2 MG/DL — LOW (ref 8.4–10.5)
CALCIUM SERPL-MCNC: 7.5 MG/DL — LOW (ref 8.4–10.5)
CHLORIDE SERPL-SCNC: 109 MMOL/L — HIGH (ref 96–108)
CHLORIDE SERPL-SCNC: 111 MMOL/L — HIGH (ref 96–108)
CK SERPL-CCNC: 20 U/L — LOW (ref 30–200)
CO2 SERPL-SCNC: 19 MMOL/L — LOW (ref 22–29)
CO2 SERPL-SCNC: 20 MMOL/L — LOW (ref 22–29)
CREAT SERPL-MCNC: 0.75 MG/DL — SIGNIFICANT CHANGE UP (ref 0.5–1.3)
CREAT SERPL-MCNC: 0.76 MG/DL — SIGNIFICANT CHANGE UP (ref 0.5–1.3)
EGFR: 94 ML/MIN/1.73M2 — SIGNIFICANT CHANGE UP
EOSINOPHIL # BLD AUTO: 0.02 K/UL — SIGNIFICANT CHANGE UP (ref 0–0.5)
EOSINOPHIL # BLD AUTO: 0.31 K/UL — SIGNIFICANT CHANGE UP (ref 0–0.5)
EOSINOPHIL NFR BLD AUTO: 0.3 % — SIGNIFICANT CHANGE UP (ref 0–6)
EOSINOPHIL NFR BLD AUTO: 2.4 % — SIGNIFICANT CHANGE UP (ref 0–6)
GAS PNL BLDA: SIGNIFICANT CHANGE UP
GAS PNL BLDA: SIGNIFICANT CHANGE UP
GLUCOSE BLDC GLUCOMTR-MCNC: 147 MG/DL — HIGH (ref 70–99)
GLUCOSE BLDC GLUCOMTR-MCNC: 176 MG/DL — HIGH (ref 70–99)
GLUCOSE BLDC GLUCOMTR-MCNC: 187 MG/DL — HIGH (ref 70–99)
GLUCOSE BLDC GLUCOMTR-MCNC: 190 MG/DL — HIGH (ref 70–99)
GLUCOSE SERPL-MCNC: 175 MG/DL — HIGH (ref 70–99)
GLUCOSE SERPL-MCNC: 183 MG/DL — HIGH (ref 70–99)
HCT VFR BLD CALC: 23.8 % — LOW (ref 39–50)
HCT VFR BLD CALC: 30.7 % — LOW (ref 39–50)
HGB BLD-MCNC: 7.8 G/DL — LOW (ref 13–17)
HGB BLD-MCNC: 9.9 G/DL — LOW (ref 13–17)
IMM GRANULOCYTES # BLD AUTO: 0.02 K/UL — SIGNIFICANT CHANGE UP (ref 0–0.07)
IMM GRANULOCYTES # BLD AUTO: 0.06 K/UL — SIGNIFICANT CHANGE UP (ref 0–0.07)
IMM GRANULOCYTES NFR BLD AUTO: 0.3 % — SIGNIFICANT CHANGE UP (ref 0–0.9)
IMM GRANULOCYTES NFR BLD AUTO: 0.5 % — SIGNIFICANT CHANGE UP (ref 0–0.9)
LYMPHOCYTES # BLD AUTO: 0.73 K/UL — LOW (ref 1–3.3)
LYMPHOCYTES # BLD AUTO: 1.39 K/UL — SIGNIFICANT CHANGE UP (ref 1–3.3)
LYMPHOCYTES NFR BLD AUTO: 10.6 % — LOW (ref 13–44)
LYMPHOCYTES NFR BLD AUTO: 10.7 % — LOW (ref 13–44)
MAGNESIUM SERPL-MCNC: 2 MG/DL — SIGNIFICANT CHANGE UP (ref 1.6–2.6)
MAGNESIUM SERPL-MCNC: 2 MG/DL — SIGNIFICANT CHANGE UP (ref 1.8–2.6)
MCHC RBC-ENTMCNC: 28.9 PG — SIGNIFICANT CHANGE UP (ref 27–34)
MCHC RBC-ENTMCNC: 29.1 PG — SIGNIFICANT CHANGE UP (ref 27–34)
MCHC RBC-ENTMCNC: 32.2 G/DL — SIGNIFICANT CHANGE UP (ref 32–36)
MCHC RBC-ENTMCNC: 32.8 G/DL — SIGNIFICANT CHANGE UP (ref 32–36)
MCV RBC AUTO: 88.1 FL — SIGNIFICANT CHANGE UP (ref 80–100)
MCV RBC AUTO: 90.3 FL — SIGNIFICANT CHANGE UP (ref 80–100)
MONOCYTES # BLD AUTO: 0.45 K/UL — SIGNIFICANT CHANGE UP (ref 0–0.9)
MONOCYTES # BLD AUTO: 1.06 K/UL — HIGH (ref 0–0.9)
MONOCYTES NFR BLD AUTO: 6.5 % — SIGNIFICANT CHANGE UP (ref 2–14)
MONOCYTES NFR BLD AUTO: 8.1 % — SIGNIFICANT CHANGE UP (ref 2–14)
NEUTROPHILS # BLD AUTO: 10.2 K/UL — HIGH (ref 1.8–7.4)
NEUTROPHILS # BLD AUTO: 5.69 K/UL — SIGNIFICANT CHANGE UP (ref 1.8–7.4)
NEUTROPHILS NFR BLD AUTO: 78.2 % — HIGH (ref 43–77)
NEUTROPHILS NFR BLD AUTO: 82.3 % — HIGH (ref 43–77)
NRBC # BLD AUTO: 0 K/UL — SIGNIFICANT CHANGE UP (ref 0–0)
NRBC # BLD AUTO: 0 K/UL — SIGNIFICANT CHANGE UP (ref 0–0)
NRBC # FLD: 0 K/UL — SIGNIFICANT CHANGE UP (ref 0–0)
NRBC # FLD: 0 K/UL — SIGNIFICANT CHANGE UP (ref 0–0)
NRBC BLD AUTO-RTO: 0 /100 WBCS — SIGNIFICANT CHANGE UP (ref 0–0)
NRBC BLD AUTO-RTO: 0 /100 WBCS — SIGNIFICANT CHANGE UP (ref 0–0)
PHOSPHATE SERPL-MCNC: 2.1 MG/DL — LOW (ref 2.4–4.7)
PHOSPHATE SERPL-MCNC: 2.4 MG/DL — SIGNIFICANT CHANGE UP (ref 2.4–4.7)
PLATELET # BLD AUTO: 143 K/UL — LOW (ref 150–400)
PLATELET # BLD AUTO: 221 K/UL — SIGNIFICANT CHANGE UP (ref 150–400)
PMV BLD: 11.1 FL — SIGNIFICANT CHANGE UP (ref 7–13)
PMV BLD: 11.4 FL — SIGNIFICANT CHANGE UP (ref 7–13)
POTASSIUM SERPL-MCNC: 3.4 MMOL/L — LOW (ref 3.5–5.3)
POTASSIUM SERPL-MCNC: 3.7 MMOL/L — SIGNIFICANT CHANGE UP (ref 3.5–5.3)
POTASSIUM SERPL-SCNC: 3.4 MMOL/L — LOW (ref 3.5–5.3)
POTASSIUM SERPL-SCNC: 3.7 MMOL/L — SIGNIFICANT CHANGE UP (ref 3.5–5.3)
PROT SERPL-MCNC: 4.2 G/DL — LOW (ref 6.6–8.7)
PROT SERPL-MCNC: 5.2 G/DL — LOW (ref 6.6–8.7)
RBC # BLD: 2.7 M/UL — LOW (ref 4.2–5.8)
RBC # BLD: 3.4 M/UL — LOW (ref 4.2–5.8)
RBC # FLD: 17.7 % — HIGH (ref 10.3–14.5)
RBC # FLD: 18.8 % — HIGH (ref 10.3–14.5)
SODIUM SERPL-SCNC: 143 MMOL/L — SIGNIFICANT CHANGE UP (ref 135–145)
SODIUM SERPL-SCNC: 145 MMOL/L — SIGNIFICANT CHANGE UP (ref 135–145)
TROPONIN T, HIGH SENSITIVITY RESULT: 160 NG/L — HIGH (ref 0–51)
TROPONIN T, HIGH SENSITIVITY RESULT: 242 NG/L — HIGH (ref 0–51)
TROPONIN T, HIGH SENSITIVITY RESULT: 291 NG/L — HIGH (ref 0–51)
TSH SERPL-MCNC: 4.97 UIU/ML — HIGH (ref 0.27–4.2)
WBC # BLD: 13.03 K/UL — HIGH (ref 3.8–10.5)
WBC # BLD: 6.91 K/UL — SIGNIFICANT CHANGE UP (ref 3.8–10.5)
WBC # FLD AUTO: 13.03 K/UL — HIGH (ref 3.8–10.5)
WBC # FLD AUTO: 6.91 K/UL — SIGNIFICANT CHANGE UP (ref 3.8–10.5)

## 2025-05-20 PROCEDURE — 99291 CRITICAL CARE FIRST HOUR: CPT

## 2025-05-20 PROCEDURE — 93325 DOPPLER ECHO COLOR FLOW MAPG: CPT | Mod: 26

## 2025-05-20 PROCEDURE — 93312 ECHO TRANSESOPHAGEAL: CPT | Mod: 26

## 2025-05-20 PROCEDURE — 93320 DOPPLER ECHO COMPLETE: CPT | Mod: 26

## 2025-05-20 PROCEDURE — 93010 ELECTROCARDIOGRAM REPORT: CPT

## 2025-05-20 PROCEDURE — 99232 SBSQ HOSP IP/OBS MODERATE 35: CPT

## 2025-05-20 PROCEDURE — 71045 X-RAY EXAM CHEST 1 VIEW: CPT | Mod: 26

## 2025-05-20 RX ORDER — MELATONIN 5 MG
3 TABLET ORAL AT BEDTIME
Refills: 0 | Status: DISCONTINUED | OUTPATIENT
Start: 2025-05-20 | End: 2025-05-24

## 2025-05-20 RX ORDER — SOD PHOS DI, MONO/K PHOS MONO 250 MG
1 TABLET ORAL ONCE
Refills: 0 | Status: COMPLETED | OUTPATIENT
Start: 2025-05-20 | End: 2025-05-20

## 2025-05-20 RX ORDER — QUETIAPINE FUMARATE 25 MG/1
12.5 TABLET ORAL AT BEDTIME
Refills: 0 | Status: DISCONTINUED | OUTPATIENT
Start: 2025-05-20 | End: 2025-05-24

## 2025-05-20 RX ORDER — HYDROMORPHONE/SOD CHLOR,ISO/PF 2 MG/10 ML
0.5 SYRINGE (ML) INJECTION ONCE
Refills: 0 | Status: DISCONTINUED | OUTPATIENT
Start: 2025-05-20 | End: 2025-05-20

## 2025-05-20 RX ORDER — POTASSIUM PHOSPHATE, MONOBASIC POTASSIUM PHOSPHATE, DIBASIC INJECTION, 236; 224 MG/ML; MG/ML
15 SOLUTION, CONCENTRATE INTRAVENOUS ONCE
Refills: 0 | Status: COMPLETED | OUTPATIENT
Start: 2025-05-20 | End: 2025-05-20

## 2025-05-20 RX ORDER — FENTANYL CITRATE-0.9 % NACL/PF 100MCG/2ML
50 SYRINGE (ML) INTRAVENOUS
Refills: 0 | Status: DISCONTINUED | OUTPATIENT
Start: 2025-05-20 | End: 2025-05-21

## 2025-05-20 RX ORDER — CALCIUM GLUCONATE 20 MG/ML
2 INJECTION, SOLUTION INTRAVENOUS ONCE
Refills: 0 | Status: COMPLETED | OUTPATIENT
Start: 2025-05-20 | End: 2025-05-20

## 2025-05-20 RX ORDER — FUROSEMIDE 10 MG/ML
20 INJECTION INTRAMUSCULAR; INTRAVENOUS ONCE
Refills: 0 | Status: COMPLETED | OUTPATIENT
Start: 2025-05-20 | End: 2025-05-20

## 2025-05-20 RX ADMIN — HEPARIN SODIUM 5000 UNIT(S): 1000 INJECTION INTRAVENOUS; SUBCUTANEOUS at 21:05

## 2025-05-20 RX ADMIN — Medication 15 MILLILITER(S): at 05:56

## 2025-05-20 RX ADMIN — Medication 0.5 MILLIGRAM(S): at 12:22

## 2025-05-20 RX ADMIN — QUETIAPINE FUMARATE 12.5 MILLIGRAM(S): 25 TABLET ORAL at 21:05

## 2025-05-20 RX ADMIN — Medication 40 MILLIEQUIVALENT(S): at 12:23

## 2025-05-20 RX ADMIN — Medication 0.5 MILLIGRAM(S): at 14:29

## 2025-05-20 RX ADMIN — PROPOFOL 16.1 MICROGRAM(S)/KG/MIN: 10 INJECTION, EMULSION INTRAVENOUS at 07:00

## 2025-05-20 RX ADMIN — Medication 20.2 MICROGRAM(S)/KG/HR: at 17:46

## 2025-05-20 RX ADMIN — INSULIN LISPRO 1: 100 INJECTION, SOLUTION INTRAVENOUS; SUBCUTANEOUS at 05:57

## 2025-05-20 RX ADMIN — Medication 0.5 MILLIGRAM(S): at 14:59

## 2025-05-20 RX ADMIN — MEROPENEM 1000 MILLIGRAM(S): 1 INJECTION INTRAVENOUS at 17:08

## 2025-05-20 RX ADMIN — MEROPENEM 1000 MILLIGRAM(S): 1 INJECTION INTRAVENOUS at 06:04

## 2025-05-20 RX ADMIN — Medication 3 MILLIGRAM(S): at 21:05

## 2025-05-20 RX ADMIN — INSULIN LISPRO 1: 100 INJECTION, SOLUTION INTRAVENOUS; SUBCUTANEOUS at 17:07

## 2025-05-20 RX ADMIN — Medication 15 MILLILITER(S): at 17:08

## 2025-05-20 RX ADMIN — Medication 10 MILLIGRAM(S): at 12:12

## 2025-05-20 RX ADMIN — FUROSEMIDE 20 MILLIGRAM(S): 10 INJECTION INTRAMUSCULAR; INTRAVENOUS at 12:12

## 2025-05-20 RX ADMIN — Medication 1 APPLICATION(S): at 13:57

## 2025-05-20 RX ADMIN — Medication 81 MILLIGRAM(S): at 13:57

## 2025-05-20 RX ADMIN — HEPARIN SODIUM 5000 UNIT(S): 1000 INJECTION INTRAVENOUS; SUBCUTANEOUS at 05:57

## 2025-05-20 RX ADMIN — Medication 1 PACKET(S): at 06:04

## 2025-05-20 RX ADMIN — POTASSIUM PHOSPHATE, MONOBASIC POTASSIUM PHOSPHATE, DIBASIC INJECTION, 62.5 MILLIMOLE(S): 236; 224 SOLUTION, CONCENTRATE INTRAVENOUS at 18:07

## 2025-05-20 RX ADMIN — Medication 0.5 MILLIGRAM(S): at 12:44

## 2025-05-20 RX ADMIN — INSULIN LISPRO 1: 100 INJECTION, SOLUTION INTRAVENOUS; SUBCUTANEOUS at 23:15

## 2025-05-20 RX ADMIN — CALCIUM GLUCONATE 200 GRAM(S): 20 INJECTION, SOLUTION INTRAVENOUS at 17:08

## 2025-05-20 RX ADMIN — HEPARIN SODIUM 5000 UNIT(S): 1000 INJECTION INTRAVENOUS; SUBCUTANEOUS at 13:57

## 2025-05-20 RX ADMIN — INSULIN GLARGINE-YFGN 20 UNIT(S): 100 INJECTION, SOLUTION SUBCUTANEOUS at 23:14

## 2025-05-20 RX ADMIN — Medication 40 MILLIEQUIVALENT(S): at 06:04

## 2025-05-20 NOTE — PROGRESS NOTE ADULT - SUBJECTIVE AND OBJECTIVE BOX
Subjective:74yMale transferred from [place] for perforated bladder, b/l pneumothoraces, pneumomediastinum recent cystoscopy, clot evacuation.  POD#3 s/p ex-lap with general surgery team, repair of bladder perforations, insertion of malecot SPT, grecia drain, 3 ways stout into bladder.  Pt remains intubated, sedated.  Stout output, clear, yellow urine, grecia serous.    Stout: yellow    Vital Signs Last 24 Hrs  T(C): 36.8 (20 May 2025 09:15), Max: 37.3 (19 May 2025 09:45)  T(F): 98.2 (20 May 2025 09:15), Max: 99.1 (19 May 2025 09:45)  HR: 48 (20 May 2025 09:15) (34 - 98)  BP: 152/62 (20 May 2025 09:00) (93/52 - 173/84)  BP(mean): 87 (20 May 2025 09:00) (64 - 113)  RR: 22 (20 May 2025 09:15) (11 - 25)  SpO2: 99% (20 May 2025 09:15) (99% - 100%)    Parameters below as of 20 May 2025 08:00  Patient On (Oxygen Delivery Method): ventilator      I&O's Detail    19 May 2025 07:01  -  20 May 2025 07:00  --------------------------------------------------------  IN:    FentaNYL: 417.8 mL    Insulin: 13 mL    IV PiggyBack: 250 mL    Propofol: 81 mL    Sterile Water (w/ Additives): 1125 mL  Total IN: 1886.8 mL    OUT:    Bulb (mL): 600 mL    Indwelling Catheter - Urethral (mL): 1330 mL  Total OUT: 1930 mL    Total NET: -43.2 mL      20 May 2025 07:01  -  20 May 2025 09:30  --------------------------------------------------------  IN:    FentaNYL: 27 mL    Propofol: 16.2 mL  Total IN: 43.2 mL    OUT:    Bulb (mL): 75 mL    Indwelling Catheter - Urethral (mL): 90 mL  Total OUT: 165 mL    Total NET: -121.8 mL          Labs:                        7.8    6.91  )-----------( 143      ( 20 May 2025 01:50 )             23.8     05-20    143  |  109[H]  |  25.2[H]  ----------------------------<  175[H]  3.4[L]   |  19.0[L]  |  0.76    Ca    7.2[L]      20 May 2025 01:50  Phos  2.1     05-20  Mg     2.0     05-20    TPro  4.2[L]  /  Alb  2.5[L]  /  TBili  0.4  /  DBili  0.1  /  AST  14  /  ALT  31  /  AlkPhos  66  05-20

## 2025-05-20 NOTE — PROGRESS NOTE ADULT - ASSESSMENT
75 yo male pt with multiple medical issues, transferred from [place] for perforated bladder, b/l pneumothoraces, pneumomediastinum recent cystoscopy, clot evacuation.  POD#3 s/p ex-lap with general surgery team, repair of bladder perforations, insertion of malecot SPT, grecia drain, 3 ways stout into bladder, persistent bradycardia  - high output grecia drain- fluid creatinine1.03- fluid likely irrigation from procedure and prior CBI irrigation intraperitoneal after perforation  - keep stout in place- will need stout at least 2-3 weeks  - keep malecot tube capped  - monitor I/O's  - BIJAL today  - wean to extubated as per SICU team  - cont abx  - VTE ppx

## 2025-05-20 NOTE — PROCEDURAL SAFETY CHECKLIST WITH OR WITHOUT SEDATION - NSPOSTCOMMENTFT_GEN_ALL_CORE
18 g, 4 Mongolian midline BARD power injectable midline placed into the right axillary vein w/ ultrasound guidance measuring 10 cm. guidewire and needle visualized under ultrasound.

## 2025-05-20 NOTE — PROGRESS NOTE ADULT - PROBLEM SELECTOR PLAN 1
TTE:  Technically very limited and difficult image quality nondiagnostic study due to pneumomediastinum air interferences, consider alternative imaging modality.  BIJAL 5/20/25:   EF 45-50%, apical anterior wall motion hypokinesis.    Patient + generalized edema noted.   /55 -GDMT - unable to start bb as patient was bradycardiac overnight.   Monitor on telemetry.  Strict i/o and daily weights.  Keep K > 4, Mg > 2.  Monitor renal function.

## 2025-05-20 NOTE — PROGRESS NOTE ADULT - PROBLEM SELECTOR PLAN 2
BIJAL 5/20/25:   EF 45-50%, apical anterior wall motion hypokinesis.    Troponin elevated at  66, 1287, 225, 261, and now 294, 224 - trending down  EKG -  Normal sinus rhythm  TTE:  Technically very limited and difficult image quality nondiagnostic study due to pneumomediastinum air interferences, consider alternative imaging modality.      Patient has history of 2 blockages untreatable per wife  Patient is currently NPO and vented.    Patient is critically ill and under the care of ICU team.  Off pressors.  Telemetry:  SR/SB.  Patient had episode of pauses of 2.8 seconds with initiation of propofol, no other episodes noted- continue telemetry. Per wife patient has known CAD and had a LHC in 2019 with Dr. Morales and has 2 blockages.  Per wife 100% blockage of dLCX x 2.    Patient is vented and critically ill on pressors, ROS unattainable.     Troponin elevated at  66, 1287, 225, 261, and now 294, 224 - trending down  BIJAL 5/20/25:   EF 45-50% (Newly reduced form 60%), apical anterior wall motion hypokinesis.    EKG  Normal sinus rhythm  Telemetry:  SR/SB.  Patient had episode of pauses of 2.8 seconds with initiation of propofol, no other episodes noted- continue telemetry.  eventual ischemia MAI (LHC) when medically optimized.  Patient is currently NPO and vented.    Patient is critically ill and under the care of ICU team.  Off pressors.

## 2025-05-20 NOTE — PROGRESS NOTE ADULT - ASSESSMENT
74M transferred from [facility] with Hx of CVA (on home plavix) with residual aphasia and R hemiparesis, HTN, HLD, DM2, CAD s/p PCI. Patient had been admitted for continued hematuria and underwent cysto with clot evacuation.   Patient was found to be hypotensive and required pressors.  Also, patient was found to have large amount of free air and fluid in abdomen due to bladder perforation and bilateral pneumothorax and pneumomediastinum.   Patient had chest tube placed on Right side but was subsequently removed before transferred.  Patient then Admitted to SICU for resuscitation.  With CT findings of air and fluid filled abdomen and bilateral pneumothorax, assumed to be form bladder perforation after cysto and clot evac; patient underwent a complex cystorrhaphy for bladder rupture on 17-May-2025.   04:24:14/ Exploratory laparotomy 17-May-2025.    A cardiac consult for troponin leak and unable to see wall motion on TTE due to air.  Per wife patient has known CAD and had a LHC in 2019 with Dr. Morales and has 2 blockages.  Per wife 100% blockage of dLCX x 2.    Patient is vented and critically ill on pressors, ROS unattainable.

## 2025-05-20 NOTE — PROGRESS NOTE ADULT - SUBJECTIVE AND OBJECTIVE BOX
[facility]                                                         CARDIOLOGY AT [facility]                                                                  [address] [facility], [address] [place] [address]                                                         Telephone: [phone]. Fax:[phone]                                                                             PROGRESS NOTE    Reason for follow up:    BIJAL unable to wall motion on TTE  Update:   BIJAL completed:  EF 45 to 50 %. Regional wall motion abnormalities present, Apical anterior segment is abnormal.    Review of symptoms:   Unable to attain.  .    Vitals:  T(C): 37 (05-20-25 @ 11:45), Max: 37.3 (05-19-25 @ 12:15)  HR: 40 (05-20-25 @ 11:45) (34 - 98)  BP: 152/62 (05-20-25 @ 09:00) (93/52 - 173/84)  RR: 24 (05-20-25 @ 11:45) (11 - 25)  SpO2: 100% (05-20-25 @ 11:45) (98% - 100%)    I&O's Summary  19 May 2025 07:01  -  20 May 2025 07:00  --------------------------------------------------------  IN: 1886.8 mL / OUT: 1930 mL / NET: -43.2 mL  20 May 2025 07:01  -  20 May 2025 11:56  --------------------------------------------------------  IN: 108 mL / OUT: 270 mL / NET: -162 mL  Weight (kg): 134.5 (05-16 @ 22:48)    PHYSICAL EXAM:  Appearance: Comfortable.   Vented, critically ill.    HEENT:  Atraumatic. Normocephalic.  Normal oral mucosa  Neurologic: A & O x 0, opens eyes with verbal stimuli.  Cardiovascular: RRR S1 S2, No murmur, no rubs/gallops. No JVD  Respiratory: Lungs clear to auscultation, unlabored   Gastrointestinal:  Soft, Non-tender, + BS  Lower Extremities: + Peripheral Pulses, No clubbing, cyanosis, or edema  Psychiatry: Patient is calm. No agitation.   Skin: warm and dry.    CURRENT OTHER MEDICATIONS:  meropenem Injectable 1000 milliGRAM(s) IV Push every 12 hours  fentaNYL   Infusion 1.5 MICROgram(s)/kG/Hr (20.2 mL/Hr) IV Continuous <Continuous>  HYDROmorphone  Injectable 0.5 milliGRAM(s) IV Push every 3 hours PRN pain, vent dyssynchrony  propofol Infusion 20 MICROgram(s)/kG/Min (16.1 mL/Hr) IV Continuous <Continuous>  dextrose 50% Injectable 25 milliLiter(s) IV Push every 15 minutes, Stop order after: 1 Doses  dextrose 50% Injectable 50 milliLiter(s) IV Push every 15 minutes, Stop order after: 1 Doses  insulin glargine Injectable (LANTUS) 20 Unit(s) SubCutaneous at bedtime  insulin lispro (ADMELOG) corrective regimen sliding scale   SubCutaneous every 6 hours  aspirin  chewable 81 milliGRAM(s) Oral daily  chlorhexidine 0.12% Liquid 15 milliLiter(s) Oral Mucosa every 12 hours  chlorhexidine 2% Cloths 1 Application(s) Topical daily  heparin   Injectable 5000 Unit(s) SubCutaneous every 8 hours  potassium chloride   Powder 40 milliEquivalent(s) Oral every 4 hours, Stop order after: 2 Doses    LABS:	 	                       7.8    6.91  )-----------( 143      ( 20 May 2025 01:50 )             23.8     05-20    143  |  109[H]  |  25.2[H]  ----------------------------<  175[H]  3.4[L]   |  19.0[L]  |  0.76    Ca    7.2[L]      20 May 2025 01:50  Phos  2.1     05-20  Mg     2.0     05-20    TPro  4.2[L]  /  Alb  2.5[L]  /  TBili  0.4  /  DBili  0.1  /  AST  14  /  ALT  31  /  AlkPhos  66  05-20    PT/INR/PTT ( 17 May 2025 05:00 )                       :                       :      16.5         :       24.6                  .        .                   .              .           .       1.43        .       TELEMETRY:     SB with pauses 2.8 while starting propofol.

## 2025-05-20 NOTE — PROGRESS NOTE ADULT - SUBJECTIVE AND OBJECTIVE BOX
SICU Attending Progress Note    24H Events:    - no acute events overnight          aspirin  chewable 81 milliGRAM(s) Oral daily  calcium gluconate IVPB 2 Gram(s) IV Intermittent once  chlorhexidine 0.12% Liquid 15 milliLiter(s) Oral Mucosa every 12 hours  chlorhexidine 2% Cloths 1 Application(s) Topical daily  dextrose 50% Injectable 25 milliLiter(s) IV Push every 15 minutes  dextrose 50% Injectable 50 milliLiter(s) IV Push every 15 minutes  fentaNYL   Infusion 1.5 MICROgram(s)/kG/Hr IV Continuous <Continuous>  heparin   Injectable 5000 Unit(s) SubCutaneous every 8 hours  HYDROmorphone  Injectable 0.5 milliGRAM(s) IV Push every 3 hours PRN  insulin glargine Injectable (LANTUS) 20 Unit(s) SubCutaneous at bedtime  insulin lispro (ADMELOG) corrective regimen sliding scale   SubCutaneous every 6 hours  melatonin 3 milliGRAM(s) Oral at bedtime  meropenem Injectable 1000 milliGRAM(s) IV Push every 12 hours  QUEtiapine 12.5 milliGRAM(s) Oral at bedtime    T(C): 37.4 (05-20-25 @ 15:30), Max: 37.4 (05-20-25 @ 15:15)  HR: 72 (05-20-25 @ 15:30) (34 - 118)  BP: 157/110 (05-20-25 @ 12:15) (93/52 - 161/66)  RR: 24 (05-20-25 @ 15:30) (11 - 33)  SpO2: 100% (05-20-25 @ 15:30) (98% - 100%)  Mode: AC/ CMV (Assist Control/ Continuous Mandatory Ventilation), RR (machine): 24, TV (machine): 450, FiO2: 40, PEEP: 6    05-19-25 @ 07:01  -  05-20-25 @ 07:00  --------------------------------------------------------  IN: 1886.8 mL / OUT: 1930 mL / NET: -43.2 mL    05-20-25 @ 07:01  -  05-20-25 @ 16:05  --------------------------------------------------------  IN: 151.2 mL / OUT: 1230 mL / NET: -1078.8 mL        PHYSICAL EXAM:    GENERAL: NAD, Resting in bed  CHEST/LUNG: Clear to auscultation bilaterally; No rales, rhonchi, wheezing, or rubs. Unlabored respirations on mechanical ventilation.  HEART: Bradycardic; No murmurs, rubs, or gallops  ABDOMEN: Bowel sounds present; Soft, Nontender, Nondistended, SBT in place  EXTREMITIES:  2+ Peripheral Pulses, brisk capillary refill. No clubbing, cyanosis, or edema  SKIN: No rashes or lesions        Assessment:      74yMale transferred from [place] with bladder rupture while undergoing CBI for hematurea. S/p emergent laparotomy with bladder repair 5/17/25      Plan:    NEURO: Following commands.on propofol and fentanyl drip, wean off as tolerted  RESP: satting well on current vent setting will continue SBT and monitor tolerance, plan to extubate in the AM   CVS: off vasopressors, MAPS above 65, Scheduled for BIJAL, remains bradycardic, patient had a short period of tachycardia up to the 150s and hypertension with SBP up to the 200s, responded to increased sedation. will order CXR, EKG, and troponin.  GI: NPO/OGT to LCWS.  : DAYDAY in setting of bladder rupture/CBI. Creatinine downtrending. monitor JOSHUA output  HEME: No evidence of ongoing bleeding but remains anemic. SCDs and HSQ for DVT ppx, continue home ASA  ID: Afebrile. No Leukocytosis. Continue Meropenem.  ENDO: Hyperglycemic. Titrate up sliding scale.  Continue start stress-dose steroids.    Dispo: Continue SICU care

## 2025-05-21 LAB
ANION GAP SERPL CALC-SCNC: 11 MMOL/L — SIGNIFICANT CHANGE UP (ref 5–17)
ANION GAP SERPL CALC-SCNC: 13 MMOL/L — SIGNIFICANT CHANGE UP (ref 5–17)
BASOPHILS # BLD AUTO: 0 K/UL — SIGNIFICANT CHANGE UP (ref 0–0.2)
BASOPHILS NFR BLD AUTO: 0 % — SIGNIFICANT CHANGE UP (ref 0–2)
BUN SERPL-MCNC: 21.3 MG/DL — HIGH (ref 8–20)
BUN SERPL-MCNC: 24.4 MG/DL — HIGH (ref 8–20)
CALCIUM SERPL-MCNC: 7.4 MG/DL — LOW (ref 8.4–10.5)
CALCIUM SERPL-MCNC: 7.9 MG/DL — LOW (ref 8.4–10.5)
CHLORIDE SERPL-SCNC: 111 MMOL/L — HIGH (ref 96–108)
CHLORIDE SERPL-SCNC: 113 MMOL/L — HIGH (ref 96–108)
CO2 SERPL-SCNC: 20 MMOL/L — LOW (ref 22–29)
CO2 SERPL-SCNC: 23 MMOL/L — SIGNIFICANT CHANGE UP (ref 22–29)
CREAT SERPL-MCNC: 0.8 MG/DL — SIGNIFICANT CHANGE UP (ref 0.5–1.3)
CREAT SERPL-MCNC: 0.89 MG/DL — SIGNIFICANT CHANGE UP (ref 0.5–1.3)
EGFR: 89 ML/MIN/1.73M2 — SIGNIFICANT CHANGE UP
EGFR: 89 ML/MIN/1.73M2 — SIGNIFICANT CHANGE UP
EGFR: 92 ML/MIN/1.73M2 — SIGNIFICANT CHANGE UP
EGFR: 92 ML/MIN/1.73M2 — SIGNIFICANT CHANGE UP
EOSINOPHIL # BLD AUTO: 0.2 K/UL — SIGNIFICANT CHANGE UP (ref 0–0.5)
EOSINOPHIL NFR BLD AUTO: 3.3 % — SIGNIFICANT CHANGE UP (ref 0–6)
GAS PNL BLDA: SIGNIFICANT CHANGE UP
GLUCOSE BLDC GLUCOMTR-MCNC: 186 MG/DL — HIGH (ref 70–99)
GLUCOSE BLDC GLUCOMTR-MCNC: 202 MG/DL — HIGH (ref 70–99)
GLUCOSE BLDC GLUCOMTR-MCNC: 205 MG/DL — HIGH (ref 70–99)
GLUCOSE BLDC GLUCOMTR-MCNC: 209 MG/DL — HIGH (ref 70–99)
GLUCOSE SERPL-MCNC: 174 MG/DL — HIGH (ref 70–99)
GLUCOSE SERPL-MCNC: 191 MG/DL — HIGH (ref 70–99)
HCT VFR BLD CALC: 27.1 % — LOW (ref 39–50)
HGB BLD-MCNC: 8.5 G/DL — LOW (ref 13–17)
IMM GRANULOCYTES # BLD AUTO: 0.03 K/UL — SIGNIFICANT CHANGE UP (ref 0–0.07)
IMM GRANULOCYTES NFR BLD AUTO: 0.5 % — SIGNIFICANT CHANGE UP (ref 0–0.9)
LYMPHOCYTES # BLD AUTO: 0.93 K/UL — LOW (ref 1–3.3)
LYMPHOCYTES NFR BLD AUTO: 15.1 % — SIGNIFICANT CHANGE UP (ref 13–44)
MAGNESIUM SERPL-MCNC: 1.9 MG/DL — SIGNIFICANT CHANGE UP (ref 1.6–2.6)
MAGNESIUM SERPL-MCNC: 2 MG/DL — SIGNIFICANT CHANGE UP (ref 1.6–2.6)
MCHC RBC-ENTMCNC: 28.6 PG — SIGNIFICANT CHANGE UP (ref 27–34)
MCHC RBC-ENTMCNC: 31.4 G/DL — LOW (ref 32–36)
MCV RBC AUTO: 91.2 FL — SIGNIFICANT CHANGE UP (ref 80–100)
MONOCYTES # BLD AUTO: 0.45 K/UL — SIGNIFICANT CHANGE UP (ref 0–0.9)
MONOCYTES NFR BLD AUTO: 7.3 % — SIGNIFICANT CHANGE UP (ref 2–14)
NEUTROPHILS # BLD AUTO: 4.53 K/UL — SIGNIFICANT CHANGE UP (ref 1.8–7.4)
NEUTROPHILS NFR BLD AUTO: 73.8 % — SIGNIFICANT CHANGE UP (ref 43–77)
NRBC # BLD AUTO: 0 K/UL — SIGNIFICANT CHANGE UP (ref 0–0)
NRBC # FLD: 0 K/UL — SIGNIFICANT CHANGE UP (ref 0–0)
NRBC BLD AUTO-RTO: 0 /100 WBCS — SIGNIFICANT CHANGE UP (ref 0–0)
PHOSPHATE SERPL-MCNC: 3 MG/DL — SIGNIFICANT CHANGE UP (ref 2.4–4.7)
PHOSPHATE SERPL-MCNC: 3.7 MG/DL — SIGNIFICANT CHANGE UP (ref 2.4–4.7)
PLATELET # BLD AUTO: 162 K/UL — SIGNIFICANT CHANGE UP (ref 150–400)
PMV BLD: 11.4 FL — SIGNIFICANT CHANGE UP (ref 7–13)
POTASSIUM SERPL-MCNC: 3.8 MMOL/L — SIGNIFICANT CHANGE UP (ref 3.5–5.3)
POTASSIUM SERPL-MCNC: 4.1 MMOL/L — SIGNIFICANT CHANGE UP (ref 3.5–5.3)
POTASSIUM SERPL-SCNC: 3.8 MMOL/L — SIGNIFICANT CHANGE UP (ref 3.5–5.3)
POTASSIUM SERPL-SCNC: 4.1 MMOL/L — SIGNIFICANT CHANGE UP (ref 3.5–5.3)
RBC # BLD: 2.97 M/UL — LOW (ref 4.2–5.8)
RBC # FLD: 18.5 % — HIGH (ref 10.3–14.5)
SODIUM SERPL-SCNC: 145 MMOL/L — SIGNIFICANT CHANGE UP (ref 135–145)
SODIUM SERPL-SCNC: 145 MMOL/L — SIGNIFICANT CHANGE UP (ref 135–145)
TROPONIN T, HIGH SENSITIVITY RESULT: 362 NG/L — HIGH (ref 0–51)
WBC # BLD: 6.14 K/UL — SIGNIFICANT CHANGE UP (ref 3.8–10.5)
WBC # FLD AUTO: 6.14 K/UL — SIGNIFICANT CHANGE UP (ref 3.8–10.5)

## 2025-05-21 PROCEDURE — 71045 X-RAY EXAM CHEST 1 VIEW: CPT | Mod: 26

## 2025-05-21 PROCEDURE — 99232 SBSQ HOSP IP/OBS MODERATE 35: CPT

## 2025-05-21 PROCEDURE — 99291 CRITICAL CARE FIRST HOUR: CPT

## 2025-05-21 PROCEDURE — 93010 ELECTROCARDIOGRAM REPORT: CPT

## 2025-05-21 RX ORDER — ACETAMINOPHEN 500 MG/5ML
975 LIQUID (ML) ORAL EVERY 6 HOURS
Refills: 0 | Status: DISCONTINUED | OUTPATIENT
Start: 2025-05-21 | End: 2025-05-27

## 2025-05-21 RX ORDER — CHLOROTHIAZIDE 250 MG/1
250 TABLET ORAL ONCE
Refills: 0 | Status: COMPLETED | OUTPATIENT
Start: 2025-05-21 | End: 2025-05-21

## 2025-05-21 RX ORDER — LISINOPRIL 5 MG/1
5 TABLET ORAL DAILY
Refills: 0 | Status: DISCONTINUED | OUTPATIENT
Start: 2025-05-21 | End: 2025-05-24

## 2025-05-21 RX ORDER — ATORVASTATIN CALCIUM 80 MG/1
20 TABLET, FILM COATED ORAL AT BEDTIME
Refills: 0 | Status: DISCONTINUED | OUTPATIENT
Start: 2025-05-21 | End: 2025-05-27

## 2025-05-21 RX ORDER — FINASTERIDE 1 MG/1
5 TABLET, FILM COATED ORAL DAILY
Refills: 0 | Status: DISCONTINUED | OUTPATIENT
Start: 2025-05-21 | End: 2025-05-27

## 2025-05-21 RX ORDER — INSULIN LISPRO 100 U/ML
5 INJECTION, SOLUTION INTRAVENOUS; SUBCUTANEOUS
Refills: 0 | Status: DISCONTINUED | OUTPATIENT
Start: 2025-05-21 | End: 2025-05-25

## 2025-05-21 RX ORDER — INSULIN LISPRO 100 U/ML
INJECTION, SOLUTION INTRAVENOUS; SUBCUTANEOUS
Refills: 0 | Status: DISCONTINUED | OUTPATIENT
Start: 2025-05-21 | End: 2025-05-25

## 2025-05-21 RX ORDER — MAGNESIUM SULFATE 500 MG/ML
2 SYRINGE (ML) INJECTION ONCE
Refills: 0 | Status: COMPLETED | OUTPATIENT
Start: 2025-05-21 | End: 2025-05-21

## 2025-05-21 RX ORDER — FUROSEMIDE 10 MG/ML
20 INJECTION INTRAMUSCULAR; INTRAVENOUS ONCE
Refills: 0 | Status: COMPLETED | OUTPATIENT
Start: 2025-05-21 | End: 2025-05-21

## 2025-05-21 RX ORDER — LEVOTHYROXINE SODIUM 300 MCG
50 TABLET ORAL DAILY
Refills: 0 | Status: DISCONTINUED | OUTPATIENT
Start: 2025-05-21 | End: 2025-05-27

## 2025-05-21 RX ADMIN — Medication 50 MICROGRAM(S): at 10:30

## 2025-05-21 RX ADMIN — FUROSEMIDE 20 MILLIGRAM(S): 10 INJECTION INTRAMUSCULAR; INTRAVENOUS at 12:52

## 2025-05-21 RX ADMIN — Medication 40 MILLIEQUIVALENT(S): at 05:37

## 2025-05-21 RX ADMIN — Medication 50 MICROGRAM(S): at 02:22

## 2025-05-21 RX ADMIN — INSULIN LISPRO 2: 100 INJECTION, SOLUTION INTRAVENOUS; SUBCUTANEOUS at 05:54

## 2025-05-21 RX ADMIN — Medication 50 MICROGRAM(S): at 09:52

## 2025-05-21 RX ADMIN — Medication 1 APPLICATION(S): at 11:49

## 2025-05-21 RX ADMIN — INSULIN LISPRO 2: 100 INJECTION, SOLUTION INTRAVENOUS; SUBCUTANEOUS at 22:17

## 2025-05-21 RX ADMIN — INSULIN LISPRO 1: 100 INJECTION, SOLUTION INTRAVENOUS; SUBCUTANEOUS at 17:54

## 2025-05-21 RX ADMIN — HEPARIN SODIUM 5000 UNIT(S): 1000 INJECTION INTRAVENOUS; SUBCUTANEOUS at 13:26

## 2025-05-21 RX ADMIN — QUETIAPINE FUMARATE 12.5 MILLIGRAM(S): 25 TABLET ORAL at 21:56

## 2025-05-21 RX ADMIN — HEPARIN SODIUM 5000 UNIT(S): 1000 INJECTION INTRAVENOUS; SUBCUTANEOUS at 21:55

## 2025-05-21 RX ADMIN — CHLOROTHIAZIDE 100 MILLIGRAM(S): 250 TABLET ORAL at 12:14

## 2025-05-21 RX ADMIN — Medication 975 MILLIGRAM(S): at 22:22

## 2025-05-21 RX ADMIN — Medication 15 MILLILITER(S): at 05:37

## 2025-05-21 RX ADMIN — INSULIN LISPRO 2: 100 INJECTION, SOLUTION INTRAVENOUS; SUBCUTANEOUS at 11:47

## 2025-05-21 RX ADMIN — Medication 25 GRAM(S): at 05:38

## 2025-05-21 RX ADMIN — Medication 100 MILLIGRAM(S): at 21:55

## 2025-05-21 RX ADMIN — Medication 3 MILLIGRAM(S): at 21:55

## 2025-05-21 RX ADMIN — INSULIN GLARGINE-YFGN 20 UNIT(S): 100 INJECTION, SOLUTION SUBCUTANEOUS at 22:06

## 2025-05-21 RX ADMIN — FINASTERIDE 5 MILLIGRAM(S): 1 TABLET, FILM COATED ORAL at 21:55

## 2025-05-21 RX ADMIN — ATORVASTATIN CALCIUM 20 MILLIGRAM(S): 80 TABLET, FILM COATED ORAL at 21:56

## 2025-05-21 RX ADMIN — Medication 50 MICROGRAM(S): at 03:00

## 2025-05-21 RX ADMIN — HEPARIN SODIUM 5000 UNIT(S): 1000 INJECTION INTRAVENOUS; SUBCUTANEOUS at 05:37

## 2025-05-21 NOTE — PROGRESS NOTE ADULT - SUBJECTIVE AND OBJECTIVE BOX
Subjective:74yMale POD#4 s/p ex-lap, repair of bladder perforation, SPT placement.  pt remains intubated, awake, not fully answering yes/no questions. BIJAL performed yesterday EF reduced to 45-50% from 60%, cardiology following pt as well, NSTEMI. Pt appears comfortable, was hypotensive last night, SBP 90's, now normotensive.    Morris: yellow  JOSHUA: serous     Vital Signs Last 24 Hrs  T(C): 37.7 (21 May 2025 09:30), Max: 37.7 (21 May 2025 00:30)  T(F): 99.9 (21 May 2025 09:30), Max: 99.9 (21 May 2025 00:30)  HR: 87 (21 May 2025 09:30) (38 - 138)  BP: 154/79 (21 May 2025 09:30) (86/57 - 179/96)  BP(mean): 101 (21 May 2025 09:30) (63 - 122)  RR: 19 (21 May 2025 09:30) (0 - 33)  SpO2: 100% (21 May 2025 09:30) (98% - 100%)    Parameters below as of 21 May 2025 08:00  Patient On (Oxygen Delivery Method): ventilator    O2 Concentration (%): 40  I&O's Detail    20 May 2025 07:01  -  21 May 2025 07:00  --------------------------------------------------------  IN:    FentaNYL: 377.3 mL    Glucerna 1.5: 360 mL    Propofol: 56.7 mL  Total IN: 794 mL    OUT:    Bulb (mL): 225 mL    Indwelling Catheter - Urethral (mL): 1765 mL  Total OUT: 1990 mL    Total NET: -1196 mL      21 May 2025 07:01  -  21 May 2025 09:53  --------------------------------------------------------  IN:    Glucerna 1.5: 80 mL  Total IN: 80 mL    OUT:    FentaNYL: 0 mL    Indwelling Catheter - Urethral (mL): 250 mL  Total OUT: 250 mL    Total NET: -170 mL          Labs:                        8.5    6.14  )-----------( 162      ( 21 May 2025 02:10 )             27.1     05-21    145  |  113[H]  |  24.4[H]  ----------------------------<  174[H]  3.8   |  20.0[L]  |  0.89    Ca    7.4[L]      21 May 2025 02:10  Phos  3.0     05-21  Mg     1.9     05-21    TPro  5.2[L]  /  Alb  2.7[L]  /  TBili  0.4  /  DBili  0.2  /  AST  15  /  ALT  31  /  AlkPhos  83  05-20

## 2025-05-21 NOTE — PROGRESS NOTE ADULT - PROBLEM SELECTOR PLAN 1
.  - presented for hypotension requiring vasopressor support  - TTE performed nondiagnostic due to free air obstructing view  - now s/p BIJAL 5/20/25:   EF 45-50%, apical anterior wall motion hypokinesis.  - unclear prior baseline will attempt to find out information from primary cardiologist  - appears fluid volume overloaded on exam, can use lasix IVP as needed for fluid management  - still with some bradycardia overnight to 50s, but need for BP control and having frequent PVCs  - start metoprolol tartrate 12.5mg PO BID with hold parameters and assess tolerance  - Monitor on telemetry.  Strict i/o and daily weights.  Keep K > 4, Mg > 2.  Monitor renal function. .  - presented for hypotension requiring vasopressor support  - TTE performed nondiagnostic due to free air obstructing view  - now s/p BIJAL 5/20/25:   EF 45-50%, (Newly reduced from baseline of 60%), apical anterior wall motion hypokinesis.  - appears fluid volume overloaded on exam, can use lasix IVP as needed for fluid management  - still with some bradycardia overnight to 50s, but need for BP control and having frequent PVCs  - start metoprolol tartrate 12.5mg PO BID with hold parameters and assess tolerance  - Monitor on telemetry.  Strict i/o and daily weights.  Keep K > 4, Mg > 2.  Monitor renal function.

## 2025-05-21 NOTE — PROGRESS NOTE ADULT - SUBJECTIVE AND OBJECTIVE BOX
INTERVAL EVENTS:    [x] Due to altered mental status/intubation, subjective information were not able to be obtained from the patient. History was obtained, to the extent possible, from review of the chart and collateral sources of information.    OBJECTIVE:    VITALS:  Vital Signs Last 24 Hrs  T(C): 37.7 (21 May 2025 11:00), Max: 37.7 (21 May 2025 00:30)  T(F): 99.9 (21 May 2025 11:00), Max: 99.9 (21 May 2025 00:30)  HR: 100 (21 May 2025 11:00) (49 - 138)  BP: 164/88 (21 May 2025 11:00) (86/57 - 179/96)  BP(mean): 108 (21 May 2025 11:00) (63 - 122)  RR: 23 (21 May 2025 11:00) (0 - 33)  SpO2: 99% (21 May 2025 11:00) (98% - 100%)    Parameters below as of 21 May 2025 10:56  Patient On (Oxygen Delivery Method): nasal cannula, 4 lpm      Mode: CPAP with PS  FiO2: 40  PEEP: 6  PS: 6  MAP: 9  PIP: 18    I&O's Summary    20 May 2025 07:01  -  21 May 2025 07:00  --------------------------------------------------------  IN: 794 mL / OUT: 1990 mL / NET: -1196 mL    21 May 2025 07:01  -  21 May 2025 12:06  --------------------------------------------------------  IN: 80 mL / OUT: 740 mL / NET: -660 mL        PHYSICAL EXAM:    Constitutional: NAD, well-groomed, well-developed  HEENT: PERRLA, EOMI, no drainage or redness  Neck: supple,  No JVD  Respiratory: Breath Sounds equal & clear bilaterally to auscultation, no rales/rhonchi/wheezing, no accessory muscle use noted  Cardiovascular: Afib, rate controlled, normal S1, S2; no murmurs or rub  Gastrointestinal: Soft, non-tender, non distended, + bowel sounds.   Extremities: ALMARAZ x 4, no peripheral edema, no cyanosis, no clubbing   Neurological: intubated, alert, following commands.   Skin: warm, dry, well perfused    ACCESS DEVICES:  [x] Peripheral IV  [ ] Central Venous Line	[ ] R	[ ] L	[ ] IJ	[ ] Fem	[ ] SC	Placed:   [ ] Arterial Line		[ ] R	[ ] L	[ ] Fem	[ ] Rad	[ ] Ax	Placed:   [ ] PICC:					[ ] Mediport  [x] Urinary Catheter, Date Placed:   [x] Necessity of urinary, arterial, and venous catheters discussed      LABS:  CAPILLARY BLOOD GLUCOSE      POCT Blood Glucose.: 202 mg/dL (21 May 2025 11:44)  POCT Blood Glucose.: 209 mg/dL (21 May 2025 05:49)  POCT Blood Glucose.: 176 mg/dL (20 May 2025 23:13)  POCT Blood Glucose.: 187 mg/dL (20 May 2025 16:54)                          8.5    6.14  )-----------( 162      ( 21 May 2025 02:10 )             27.1     05-21    145  |  113[H]  |  24.4[H]  ----------------------------<  174[H]  3.8   |  20.0[L]  |  0.89    Ca    7.4[L]      21 May 2025 02:10  Phos  3.0     05-21  Mg     1.9     05-21    TPro  5.2[L]  /  Alb  2.7[L]  /  TBili  0.4  /  DBili  0.2  /  AST  15  /  ALT  31  /  AlkPhos  83  05-20        Mode: CPAP with PS, RR (patient): 18, FiO2: 40, PEEP: 6, PS: 6, MAP: 9, PIP: 18  ABG - ( 21 May 2025 05:30 )  pH, Arterial: 7.440 pH, Blood: x     /  pCO2: 34    /  pO2: 136   / HCO3: 23    / Base Excess: -1.1  /  SaO2: 99.5              Urinalysis Basic - ( 21 May 2025 02:10 )    Color: x / Appearance: x / SG: x / pH: x  Gluc: 174 mg/dL / Ketone: x  / Bili: x / Urobili: x   Blood: x / Protein: x / Nitrite: x   Leuk Esterase: x / RBC: x / WBC x   Sq Epi: x / Non Sq Epi: x / Bacteria: x      Mode: CPAP with PS, RR (patient): 18, FiO2: 40, PEEP: 6, PS: 6, MAP: 9, PIP: 18      MEDICATIONS:   MEDICATIONS  (STANDING):  aspirin  chewable 81 milliGRAM(s) Oral daily  chlorhexidine 2% Cloths 1 Application(s) Topical daily  chlorothiazide IVPB 250 milliGRAM(s) IV Intermittent once  furosemide   Injectable 20 milliGRAM(s) IV Push once  heparin   Injectable 5000 Unit(s) SubCutaneous every 8 hours  insulin glargine Injectable (LANTUS) 20 Unit(s) SubCutaneous at bedtime  insulin lispro (ADMELOG) corrective regimen sliding scale   SubCutaneous every 6 hours  melatonin 3 milliGRAM(s) Oral at bedtime  QUEtiapine 12.5 milliGRAM(s) Oral at bedtime    MEDICATIONS  (PRN):  fentaNYL    Injectable 50 MICROGram(s) IV Push every 3 hours PRN vent dyssynchrony / pain

## 2025-05-21 NOTE — PROGRESS NOTE ADULT - ASSESSMENT
74M transferred from [facility] with Hx of CVA (on home plavix) with residual aphasia and R hemiparesis, HTN, HLD, DM2, CAD s/p PCI. Patient had been admitted for continued hematuria and underwent cysto with clot evacuation.   Patient was found to be hypotensive and required pressors.  Also, patient was found to have largeamount of free air and fluid in abdomen due to bladder perforation and bilateral pneumothorax and pneumomediastinum.   Patient had chest tube placed on Right side but was subsequently removed before transferred.  Patient then Admitted to SICU for resuscitation.  With CT findings of air and fluid filled abdomen and bilateral pneumothorax, assumed to be form bladder perforation after cysto and clot evac; patient underwent a complex cystorrhaphy for bladder rupture on 17-May-2025.   04:24:14/ Exploratory laparotomy 17-May-2025.    A cardiac consult for troponin leak and unable to see wall motion on TTE due to air.  Per wife patient has known CAD and had a LHC in 2019 with Dr. Morales and has 2 blockages.  Per wife 100% blockage of dLCX x 2.    Patient is vented and critically ill on pressors, ROS unattainable.

## 2025-05-21 NOTE — PROGRESS NOTE ADULT - ASSESSMENT
ASSESSMENT:  75yo Male with PMH CVA, CAD s/p PCI, DM, HTN, who initially underwent for elective cystoscopy with CBI and TURP in [place] on 5/15 c/w bladder rupture and septic shock, transferred to [facility] s/p ex lap with bladder repair and SPT placement on 5/16, post=op admitted to SICU for vent depended respiratory failure and septic shock. Currently septic shock is resolved.        PLAN:    NEURO: intubated, but not sedated, follow command off sedation  -keep sedation off for exudation planning   -multimodal pain control  -delirium precaution    RESPIRATORY: vent depended respiratory failure  - Maintain SAO2>92%, SBT, and plan for extubation    CARDIOVASCULAR: bradycardia resolved, off pressor, BIJAL reviewed   - MAP goal >65  - will resume home metoprolol  - cardiology consult appreciated, no intervention, continue ASA, will need repeat BIJAL in few days to reassess EF   -trend trop    GI/NUTRITION: s/p exlap, tolerates TF at goal  - hold TF for extubation  - will adv diet as tolerate once extubated and pass bedside swallow   -continue bowel regiment     GENITOURINARY/RENAL: s/p TURP and bladder repair with SPT and stout   - Monitor I/Os  - Trend BUN/Cr daily  - Trend electrolytes, replete PRN  - give thiazide and Lasix 20 for diuresis today, aim for -1 L     HEMATOLOGIC: No active issue  - Trend CBC, Coags daily  - VTE ppx: SCDs, chemical DVT ppx    INFECTIOUS DISEASE: s/p bladder rupture and repair, finished abx course yesterday   - Trend WBC, monitor for signs of infection  - no indication for abx currently     ENDOCRINE:   -f/u FS and continue lantus and ISS  -resume synthroid     MUSCULOSKELETAL: PT/OOBTC after extubation     LINES/TUBES/DRAINS: PIVs, luca, stout, SPT, will DC OGT and ETT after extubation     DISPO: SICU    CODE STATUS: FULL    Patient required critical care management and is at risk for life threatening decompensation  I provided ____45mins____of non-continuous care to the patient.

## 2025-05-21 NOTE — PROGRESS NOTE ADULT - PROBLEM SELECTOR PLAN 2
.  -  Troponin elevated at  66, 1287, 225, 261, and now 294, 224 - trending down  - Per wife patient has known CAD and had a LHC in 2019 with Dr. Morales and has 2 blockages.  Per wife 100% blockage of dLCX x 2   - BIJAL 5/20/25:   EF 45-50% (Newly reduced form 60%), apical anterior wall motion hypokinesis.    - EKG  Normal sinus rhythm with frequent PVCs  - Telemetry:  SR/SB. SB to 50bpm while sleeping  - plan for extubation today  - patient with several co-morbid conditions, will need f/u of OP record to assess need for inpatient ischemic eval this visit  - consider repeat TTE in a few days to assess if EF still reduced .  -  Troponin elevated at  66, 1287, 225, 261, and now 294, 224 - trending down  - Per wife patient has known CAD and had a LHC in 2019 with Dr. Morales and has 2 blockages.  Per wife 100% blockage of dLCX x 2   - BIJAL 5/20/25:   EF 45-50% (Newly reduced from baseline 60%), apical anterior wall motion hypokinesis.    - EKG  Normal sinus rhythm with frequent PVCs  - Telemetry:  SR/SB. SB to 50bpm while sleeping  - plan for extubation today  - patient with several co-morbid conditions, will need f/u of OP record to assess need for inpatient ischemic eval this visit  - consider repeat TTE in a few days to assess if EF still reduced

## 2025-05-21 NOTE — PROGRESS NOTE ADULT - ASSESSMENT
75 yo male Tx from [place] for bladder perforation, prior cysto, clot evacuation, b/l PTX, pneumomediastinum, now POD#4 s/p ex-lap, repair of bladder perforation, SPT placement, NSTEMI  - cont stout catheter  - JOSHUA with decreased output  - cont to keep SPT capped  - will d/c prevena in am  - cont VTE ppx  - wean to extubate as per SICU team  - would consider transfer to medicine once stable for downgrade to floor 73 yo male Tx from [place] for bladder perforation, prior cysto, clot evacuation, b/l PTX, pneumomediastinum, now POD#4 s/p ex-lap, repair of bladder perforation, SPT placement, NSTEMI  - cont stout catheter 2-3 weeks  - JOSHUA with decreased output  - cont to keep SPT capped  - will d/c prevena in am  - cont VTE ppx  - DAYDAY resolved  - will need LHC when stable as per cordiology  - wean to extubate as per SICU team  - would consider transfer to medicine once stable for downgrade to floor

## 2025-05-21 NOTE — PROGRESS NOTE ADULT - SUBJECTIVE AND OBJECTIVE BOX
[facility]                                                         CARDIOLOGY AT [facility]                                                                  [address] [address]                                                         Telephone: [phone]. Fax:[phone]                                                                             PROGRESS NOTE    Reason for follow up: [facility]  Update: plan to extubate patient today. d/w patient's wife.     Review of symptoms:   Patient cannot participate in exam due to current medical condition      Vitals:  T(C): 37.6 (05-21-25 @ 10:30), Max: 37.7 (05-21-25 @ 00:30)  HR: 90 (05-21-25 @ 10:30) (39 - 138)  BP: 171/94 (05-21-25 @ 10:30) (86/57 - 179/96)  RR: 18 (05-21-25 @ 10:30) (0 - 33)  SpO2: 99% (05-21-25 @ 10:56) (98% - 100%)  Wt(kg): --  I&O's Summary    20 May 2025 07:01  -  21 May 2025 07:00  --------------------------------------------------------  IN: 794 mL / OUT: 1990 mL / NET: -1196 mL    21 May 2025 07:01  -  21 May 2025 11:12  --------------------------------------------------------  IN: 120 mL / OUT: 565 mL / NET: -445 mL      Weight (kg): 134.5 (05-16 @ 22:48)    PHYSICAL EXAM:  Appearance: Comfortable. No acute distress  HEENT:  Atraumatic. Normocephalic.  Normal oral mucosa  Neurologic: awake unable to speak, no gross focal deficits.  Cardiovascular: RRR S1 S2, No murmur, no rubs/gallops. No JVD  Respiratory: intubated Lungs clear to auscultation, unlabored   Gastrointestinal:  Soft, Non-tender, + BS  Lower Extremities: 2+ Peripheral Pulses, No clubbing, cyanosis, or edema  Psychiatry: Patient is calm. No agitation.   Skin: warm and dry.    CURRENT CARDIAC MEDICATIONS:  chlorothiazide IVPB 250 milliGRAM(s) IV Intermittent once  furosemide   Injectable 20 milliGRAM(s) IV Push once      CURRENT OTHER MEDICATIONS:  fentaNYL    Injectable 50 MICROGram(s) IV Push every 3 hours PRN vent dyssynchrony / pain  melatonin 3 milliGRAM(s) Oral at bedtime  QUEtiapine 12.5 milliGRAM(s) Oral at bedtime  insulin glargine Injectable (LANTUS) 20 Unit(s) SubCutaneous at bedtime  insulin lispro (ADMELOG) corrective regimen sliding scale   SubCutaneous every 6 hours  aspirin  chewable 81 milliGRAM(s) Oral daily  chlorhexidine 2% Cloths 1 Application(s) Topical daily  heparin   Injectable 5000 Unit(s) SubCutaneous every 8 hours      LABS:	 	                            8.5    6.14  )-----------( 162      ( 21 May 2025 02:10 )             27.1     05-21    145  |  113[H]  |  24.4[H]  ----------------------------<  174[H]  3.8   |  20.0[L]  |  0.89    Ca    7.4[L]      21 May 2025 02:10  Phos  3.0     05-21  Mg     1.9     05-21    TPro  5.2[L]  /  Alb  2.7[L]  /  TBili  0.4  /  DBili  0.2  /  AST  15  /  ALT  31  /  AlkPhos  83  05-20    PT/INR/PTT ( 17 May 2025 05:00 )                       :                       :      16.5         :       24.6                  .        .                   .              .           .       1.43        .                                       Lipid Profile:   HgA1c:   TSH: Thyroid Stimulating Hormone, Serum: 4.97 uIU/mL      TELEMETRY: NSR with frequent PVCs  ECG:    DIAGNOSTIC TESTING:  [ ] Echocardiogram: < from: BIJAL W or WO Ultrasound Enhancing Agent (05.20.25 @ 08:11) >  Baltazar Avila on 5/20/2025 at 10:44:43 AM                       TRANSESOPHAGEAL ECHOCARDIOGRAM REPORT  ________________________________________________________________________________                      _______       Pt. Name:       LAZARA HAWLEY Study Date:    5/20/2025  MRN:            [id number]        YOB: 1950  Accession #:    [id number]       Age:           74 years  Account#:       [id number]      Gender:   M  Heart Rate:                     Height:        79.00 in (200.66 cm)  Rhythm:                         Weight:        286.62 lb (130.01 kg)  Blood Pressure: 130/80 mmHg     BSA/BMI:       2.66 m² / 32.29 kg/m²  ________________________________________________________________________________________  Referring Physician:    [id number] Wild Bey  Interpreting Physician: Baltazar Avila  Primary Sonographer:    Juventino Sarmiento    CPT:               ECHO TRANSESOPH W/O CON - 03644.m;DOPPLER ECHO COMP W SPECT -                     33768.m;DOPPL ECHO COLOR FLOW - 70162.m  Indication(s):     Abnormal electrocardiogram ECG EKG - R94.31  Procedure:         Transesophageal echocardiogram performed with 2D, M-mode and                     complete spectral and color flow Doppler.  Ordering Location: 3E  Admission Status:  Inpatient    _______________________________________________________________________________________     CONCLUSIONS:      1. Left ventricular systolic function is mildlydecreased with an ejection fraction visually estimated at 45 to 50 %. Regional wall motion abnormalities present.   2. Apical anterior segment is abnormal.   3. No evidence of left atrial or left atrial appendage thrombus.    < end of copied text >    [ ]  Catheterization:  [ ] Stress Test:    OTHER:

## 2025-05-21 NOTE — PROGRESS NOTE ADULT - ASSESSMENT
ASSESSMENT:  LAZARA HAWLEY is a 74y Male hx CVA s/p expressive aphasia and Rt hemiplegia, CAD s/p PCI, DM, HTN transferred from [employer] for bladder rupture and pneumomediastinum.     PLAN: Plan to extubate patient and monitor lung function. Will continue diuresis with lasix, added diuril. Additional labs and troponin drawn. Plan to follow-up and trend labs.     NEURO:   - Alert, following commands    RESPIRATORY:   - Maintain SAO2>92%    CARDIOVASCULAR:  - MAP goal >65    GI/NUTRITION:   - glucerna with OGT    GENITOURINARY/RENAL:  - Monitor I/Os  - Trend BUN/Cr daily  - Trend electrolytes, replete PRN    HEMATOLOGIC:  - Trend CBC, Coags daily  - VTE ppx: SCDs,     INFECTIOUS DISEASE:  - Trend WBC, monitor for signs of infection    ENDOCRINE:  - lantus 20 QHS    MUSCULOSKELETAL:    LINES/TUBES/DRAINS:  - PIV, 3-way stout, Malencott, Alex drain, Prevena    DISPO:     CODE STATUS:     Patient required critical care management and is at risk for life threatening decompensation  I provided ________of non-continuous care to the patient.   ASSESSMENT:  LAZARA HAWLEY is a 74y Male hx CVA s/p expressive aphasia and Rt hemiplegia, CAD s/p PCI, DM, HTN transferred from [place] for bladder rupture and pneumomediastinum.     PLAN: Plan to extubate patient and monitor lung function. Will continue diuresis with lasix, added diuril. Additional labs and troponin drawn. Will check pre-meal sugars and adjust lantis accordingly. Plan to follow-up and trend labs.     NEURO:   - Alert, following commands    RESPIRATORY:   - Maintain SAO2>92%    CARDIOVASCULAR:  - MAP goal >65    GI/NUTRITION:   - glucerna with OGT    GENITOURINARY/RENAL:  - Monitor I/Os  - Trend BUN/Cr daily  - Trend electrolytes, replete PRN    HEMATOLOGIC:  - Trend CBC, Coags daily  - VTE ppx: SCDs,     INFECTIOUS DISEASE:  - Trend WBC, monitor for signs of infection    ENDOCRINE:  - lantus 20 QHS    MUSCULOSKELETAL:    LINES/TUBES/DRAINS:  - PIV, 3-way stout, Malencott, Alex drain, Prevena    DISPO:     CODE STATUS:     Patient required critical care management and is at risk for life threatening decompensation  I provided ________of non-continuous care to the patient.

## 2025-05-21 NOTE — PROGRESS NOTE ADULT - SUBJECTIVE AND OBJECTIVE BOX
INTERVAL EVENTS:    [ ] Due to altered mental status/intubation, subjective information were not able to be obtained from the patient. History was obtained, to the extent possible, from review of the chart and collateral sources of information.    OBJECTIVE:    VITALS:  Vital Signs Last 24 Hrs  T(C): 37.5 (21 May 2025 07:00), Max: 37.7 (21 May 2025 00:30)  T(F): 99.5 (21 May 2025 07:00), Max: 99.9 (21 May 2025 00:30)  HR: 96 (21 May 2025 07:00) (38 - 138)  BP: 179/96 (21 May 2025 07:00) (86/57 - 179/96)  BP(mean): 122 (21 May 2025 07:00) (63 - 122)  RR: 12 (21 May 2025 07:00) (0 - 33)  SpO2: 100% (21 May 2025 07:00) (98% - 100%)    Parameters below as of 21 May 2025 04:00  Patient On (Oxygen Delivery Method): ventilator    O2 Concentration (%): 40  Mode: CPAP with PS  FiO2: 40  PEEP: 6  PS: 12  MAP: 9  PIP: 18    I&O's Summary    20 May 2025 07:01  -  21 May 2025 07:00  --------------------------------------------------------  IN: 794 mL / OUT: 1990 mL / NET: -1196 mL        PHYSICAL EXAM:    Constitutional: NAD, well-groomed, well-developed  HEENT: PERRLA, EOMI, no drainage or redness  Neck: supple,  No JVD  Respiratory: Breath Sounds equal & clear bilaterally to auscultation, no rales/rhonchi/wheezing, no accessory muscle use noted  Cardiovascular: Regular rate, regular rhythm, normal S1, S2; no murmurs or rub  Gastrointestinal: Soft, non-tender, non distended, + bowel sounds.   Extremities: ALMARAZ x 4, no peripheral edema, no cyanosis, no clubbing   Neurological: A+O x 3; speech clear and intact; no sensory, motor  deficits  Skin: warm, dry, well perfused    ACCESS DEVICES:  [ ] Peripheral IV  [ ] Central Venous Line	[ ] R	[ ] L	[ ] IJ	[ ] Fem	[ ] SC	Placed:   [ ] Arterial Line		[ ] R	[ ] L	[ ] Fem	[ ] Rad	[ ] Ax	Placed:   [ ] PICC:					[ ] Mediport  [ ] Urinary Catheter, Date Placed:   [x] Necessity of urinary, arterial, and venous catheters discussed      LABS:  CAPILLARY BLOOD GLUCOSE      POCT Blood Glucose.: 209 mg/dL (21 May 2025 05:49)  POCT Blood Glucose.: 176 mg/dL (20 May 2025 23:13)  POCT Blood Glucose.: 187 mg/dL (20 May 2025 16:54)  POCT Blood Glucose.: 147 mg/dL (20 May 2025 11:29)                          8.5    6.14  )-----------( 162      ( 21 May 2025 02:10 )             27.1     05-21    145  |  113[H]  |  24.4[H]  ----------------------------<  174[H]  3.8   |  20.0[L]  |  0.89    Ca    7.4[L]      21 May 2025 02:10  Phos  3.0     05-21  Mg     1.9     05-21    TPro  5.2[L]  /  Alb  2.7[L]  /  TBili  0.4  /  DBili  0.2  /  AST  15  /  ALT  31  /  AlkPhos  83  05-20        Mode: CPAP with PS, RR (patient): 12, FiO2: 40, PEEP: 6, PS: 12, MAP: 9, PIP: 18  ABG - ( 21 May 2025 05:30 )  pH, Arterial: 7.440 pH, Blood: x     /  pCO2: 34    /  pO2: 136   / HCO3: 23    / Base Excess: -1.1  /  SaO2: 99.5              Urinalysis Basic - ( 21 May 2025 02:10 )    Color: x / Appearance: x / SG: x / pH: x  Gluc: 174 mg/dL / Ketone: x  / Bili: x / Urobili: x   Blood: x / Protein: x / Nitrite: x   Leuk Esterase: x / RBC: x / WBC x   Sq Epi: x / Non Sq Epi: x / Bacteria: x      Mode: CPAP with PS, RR (patient): 12, FiO2: 40, PEEP: 6, PS: 12, MAP: 9, PIP: 18      MEDICATIONS:   MEDICATIONS  (STANDING):  aspirin  chewable 81 milliGRAM(s) Oral daily  chlorhexidine 0.12% Liquid 15 milliLiter(s) Oral Mucosa every 12 hours  chlorhexidine 2% Cloths 1 Application(s) Topical daily  fentaNYL   Infusion 1.5 MICROgram(s)/kG/Hr (20.2 mL/Hr) IV Continuous <Continuous>  heparin   Injectable 5000 Unit(s) SubCutaneous every 8 hours  insulin glargine Injectable (LANTUS) 20 Unit(s) SubCutaneous at bedtime  insulin lispro (ADMELOG) corrective regimen sliding scale   SubCutaneous every 6 hours  melatonin 3 milliGRAM(s) Oral at bedtime  QUEtiapine 12.5 milliGRAM(s) Oral at bedtime    MEDICATIONS  (PRN):  fentaNYL    Injectable 50 MICROGram(s) IV Push every 3 hours PRN vent dyssynchrony / pain   INTERVAL EVENTS:    -Did not tolerated PSV yesterday due to tachycardia, tolerating PSV 6/6 this morning   -BIJAL showed EF 45-55% with apical anterior wall motion hypokinesis, trop is slightly trended up, EKG showed NSR with PVC, cardiology consult noted, no acute intervention, most likely type II NSTEMI   -20 lasix given yesterday with net negative 1.2L    OBJECTIVE:    VITALS:  Vital Signs Last 24 Hrs  T(C): 37.5 (21 May 2025 07:00), Max: 37.7 (21 May 2025 00:30)  T(F): 99.5 (21 May 2025 07:00), Max: 99.9 (21 May 2025 00:30)  HR: 96 (21 May 2025 07:00) (38 - 138)  BP: 179/96 (21 May 2025 07:00) (86/57 - 179/96)  BP(mean): 122 (21 May 2025 07:00) (63 - 122)  RR: 12 (21 May 2025 07:00) (0 - 33)  SpO2: 100% (21 May 2025 07:00) (98% - 100%)    Parameters below as of 21 May 2025 04:00  Patient On (Oxygen Delivery Method): ventilator    O2 Concentration (%): 40  Mode: CPAP with PS  FiO2: 40  PEEP: 6  PS: 12  MAP: 9  PIP: 18    I&O's Summary    20 May 2025 07:01  -  21 May 2025 07:00  --------------------------------------------------------  IN: 794 mL / OUT: 1990 mL / NET: -1196 mL        PHYSICAL EXAM:    Constitutional: Intubated, but not sedated   Respiratory:  intubated on PSV 6/6, CXR showed improved left basilar opacity   Cardiovascular: HR 40-110s, off pressor   Gastrointestinal: Soft, non-tender, non distended, SBT in place  Extremities: ALMARAZ x 4, positive peripheral edema  Neurological: A+O x 3, no sensory, motor  deficits    ACCESS DEVICES:  [x] Peripheral IV  [ ] Central Venous Line	[ ] R	[ ] L	[ ] IJ	[ ] Fem	[ ] SC	Placed:   [x] Arterial Line		[ ] R	[ ] L	[ ] Fem	[ ] Rad	[ ] Ax	Placed:   [ ] PICC:					[ ] Mediport  [x] Urinary Catheter, Date Placed:   [x] Necessity of urinary, arterial, and venous catheters discussed      LABS:  CAPILLARY BLOOD GLUCOSE      POCT Blood Glucose.: 209 mg/dL (21 May 2025 05:49)  POCT Blood Glucose.: 176 mg/dL (20 May 2025 23:13)  POCT Blood Glucose.: 187 mg/dL (20 May 2025 16:54)  POCT Blood Glucose.: 147 mg/dL (20 May 2025 11:29)                          8.5    6.14  )-----------( 162      ( 21 May 2025 02:10 )             27.1     05-21    145  |  113[H]  |  24.4[H]  ----------------------------<  174[H]  3.8   |  20.0[L]  |  0.89    Ca    7.4[L]      21 May 2025 02:10  Phos  3.0     05-21  Mg     1.9     05-21    TPro  5.2[L]  /  Alb  2.7[L]  /  TBili  0.4  /  DBili  0.2  /  AST  15  /  ALT  31  /  AlkPhos  83  05-20        Mode: CPAP with PS, RR (patient): 12, FiO2: 40, PEEP: 6, PS: 12, MAP: 9, PIP: 18  ABG - ( 21 May 2025 05:30 )  pH, Arterial: 7.440 pH, Blood: x     /  pCO2: 34    /  pO2: 136   / HCO3: 23    / Base Excess: -1.1  /  SaO2: 99.5              Urinalysis Basic - ( 21 May 2025 02:10 )    Color: x / Appearance: x / SG: x / pH: x  Gluc: 174 mg/dL / Ketone: x  / Bili: x / Urobili: x   Blood: x / Protein: x / Nitrite: x   Leuk Esterase: x / RBC: x / WBC x   Sq Epi: x / Non Sq Epi: x / Bacteria: x      Mode: CPAP with PS, RR (patient): 12, FiO2: 40, PEEP: 6, PS: 12, MAP: 9, PIP: 18      MEDICATIONS:   MEDICATIONS  (STANDING):  aspirin  chewable 81 milliGRAM(s) Oral daily  chlorhexidine 0.12% Liquid 15 milliLiter(s) Oral Mucosa every 12 hours  chlorhexidine 2% Cloths 1 Application(s) Topical daily  fentaNYL   Infusion 1.5 MICROgram(s)/kG/Hr (20.2 mL/Hr) IV Continuous <Continuous>  heparin   Injectable 5000 Unit(s) SubCutaneous every 8 hours  insulin glargine Injectable (LANTUS) 20 Unit(s) SubCutaneous at bedtime  insulin lispro (ADMELOG) corrective regimen sliding scale   SubCutaneous every 6 hours  melatonin 3 milliGRAM(s) Oral at bedtime  QUEtiapine 12.5 milliGRAM(s) Oral at bedtime    MEDICATIONS  (PRN):  fentaNYL    Injectable 50 MICROGram(s) IV Push every 3 hours PRN vent dyssynchrony / pain

## 2025-05-22 LAB
ALBUMIN SERPL ELPH-MCNC: 2.5 G/DL — LOW (ref 3.3–5.2)
ALP SERPL-CCNC: 80 U/L — SIGNIFICANT CHANGE UP (ref 40–120)
ALT FLD-CCNC: 17 U/L — SIGNIFICANT CHANGE UP
ANION GAP SERPL CALC-SCNC: 12 MMOL/L — SIGNIFICANT CHANGE UP (ref 5–17)
AST SERPL-CCNC: 11 U/L — SIGNIFICANT CHANGE UP
BASOPHILS # BLD AUTO: 0.01 K/UL — SIGNIFICANT CHANGE UP (ref 0–0.2)
BASOPHILS NFR BLD AUTO: 0.1 % — SIGNIFICANT CHANGE UP (ref 0–2)
BILIRUB SERPL-MCNC: 0.4 MG/DL — SIGNIFICANT CHANGE UP (ref 0.4–2)
BUN SERPL-MCNC: 17.6 MG/DL — SIGNIFICANT CHANGE UP (ref 8–20)
CALCIUM SERPL-MCNC: 7.7 MG/DL — LOW (ref 8.4–10.5)
CHLORIDE SERPL-SCNC: 108 MMOL/L — SIGNIFICANT CHANGE UP (ref 96–108)
CO2 SERPL-SCNC: 24 MMOL/L — SIGNIFICANT CHANGE UP (ref 22–29)
CREAT SERPL-MCNC: 0.7 MG/DL — SIGNIFICANT CHANGE UP (ref 0.5–1.3)
EGFR: 96 ML/MIN/1.73M2 — SIGNIFICANT CHANGE UP
EGFR: 96 ML/MIN/1.73M2 — SIGNIFICANT CHANGE UP
EOSINOPHIL # BLD AUTO: 0.23 K/UL — SIGNIFICANT CHANGE UP (ref 0–0.5)
EOSINOPHIL NFR BLD AUTO: 3.3 % — SIGNIFICANT CHANGE UP (ref 0–6)
GLUCOSE BLDC GLUCOMTR-MCNC: 139 MG/DL — HIGH (ref 70–99)
GLUCOSE BLDC GLUCOMTR-MCNC: 144 MG/DL — HIGH (ref 70–99)
GLUCOSE BLDC GLUCOMTR-MCNC: 160 MG/DL — HIGH (ref 70–99)
GLUCOSE BLDC GLUCOMTR-MCNC: 167 MG/DL — HIGH (ref 70–99)
GLUCOSE SERPL-MCNC: 151 MG/DL — HIGH (ref 70–99)
HCT VFR BLD CALC: 28.2 % — LOW (ref 39–50)
HGB BLD-MCNC: 8.9 G/DL — LOW (ref 13–17)
IMM GRANULOCYTES # BLD AUTO: 0.03 K/UL — SIGNIFICANT CHANGE UP (ref 0–0.07)
IMM GRANULOCYTES NFR BLD AUTO: 0.4 % — SIGNIFICANT CHANGE UP (ref 0–0.9)
LYMPHOCYTES # BLD AUTO: 1.12 K/UL — SIGNIFICANT CHANGE UP (ref 1–3.3)
LYMPHOCYTES NFR BLD AUTO: 16.3 % — SIGNIFICANT CHANGE UP (ref 13–44)
MAGNESIUM SERPL-MCNC: 1.8 MG/DL — SIGNIFICANT CHANGE UP (ref 1.6–2.6)
MCHC RBC-ENTMCNC: 28.7 PG — SIGNIFICANT CHANGE UP (ref 27–34)
MCHC RBC-ENTMCNC: 31.6 G/DL — LOW (ref 32–36)
MCV RBC AUTO: 91 FL — SIGNIFICANT CHANGE UP (ref 80–100)
MONOCYTES # BLD AUTO: 0.44 K/UL — SIGNIFICANT CHANGE UP (ref 0–0.9)
MONOCYTES NFR BLD AUTO: 6.4 % — SIGNIFICANT CHANGE UP (ref 2–14)
NEUTROPHILS # BLD AUTO: 5.06 K/UL — SIGNIFICANT CHANGE UP (ref 1.8–7.4)
NEUTROPHILS NFR BLD AUTO: 73.5 % — SIGNIFICANT CHANGE UP (ref 43–77)
NRBC # BLD AUTO: 0 K/UL — SIGNIFICANT CHANGE UP (ref 0–0)
NRBC # FLD: 0 K/UL — SIGNIFICANT CHANGE UP (ref 0–0)
NRBC BLD AUTO-RTO: 0 /100 WBCS — SIGNIFICANT CHANGE UP (ref 0–0)
PHOSPHATE SERPL-MCNC: 3.4 MG/DL — SIGNIFICANT CHANGE UP (ref 2.4–4.7)
PLATELET # BLD AUTO: 158 K/UL — SIGNIFICANT CHANGE UP (ref 150–400)
PMV BLD: 11.2 FL — SIGNIFICANT CHANGE UP (ref 7–13)
POTASSIUM SERPL-MCNC: 3.5 MMOL/L — SIGNIFICANT CHANGE UP (ref 3.5–5.3)
POTASSIUM SERPL-SCNC: 3.5 MMOL/L — SIGNIFICANT CHANGE UP (ref 3.5–5.3)
PROT SERPL-MCNC: 4.6 G/DL — LOW (ref 6.6–8.7)
RBC # BLD: 3.1 M/UL — LOW (ref 4.2–5.8)
RBC # FLD: 18 % — HIGH (ref 10.3–14.5)
SODIUM SERPL-SCNC: 144 MMOL/L — SIGNIFICANT CHANGE UP (ref 135–145)
TROPONIN T, HIGH SENSITIVITY RESULT: 319 NG/L — HIGH (ref 0–51)
TROPONIN T, HIGH SENSITIVITY RESULT: 364 NG/L — HIGH (ref 0–51)
WBC # BLD: 6.89 K/UL — SIGNIFICANT CHANGE UP (ref 3.8–10.5)
WBC # FLD AUTO: 6.89 K/UL — SIGNIFICANT CHANGE UP (ref 3.8–10.5)

## 2025-05-22 PROCEDURE — 99223 1ST HOSP IP/OBS HIGH 75: CPT

## 2025-05-22 PROCEDURE — 93010 ELECTROCARDIOGRAM REPORT: CPT

## 2025-05-22 PROCEDURE — 99232 SBSQ HOSP IP/OBS MODERATE 35: CPT

## 2025-05-22 RX ORDER — ENOXAPARIN SODIUM 100 MG/ML
40 INJECTION SUBCUTANEOUS EVERY 24 HOURS
Refills: 0 | Status: DISCONTINUED | OUTPATIENT
Start: 2025-05-22 | End: 2025-05-27

## 2025-05-22 RX ORDER — CARVEDILOL 3.12 MG/1
12.5 TABLET, FILM COATED ORAL EVERY 12 HOURS
Refills: 0 | Status: DISCONTINUED | OUTPATIENT
Start: 2025-05-22 | End: 2025-05-22

## 2025-05-22 RX ORDER — CARVEDILOL 3.12 MG/1
25 TABLET, FILM COATED ORAL EVERY 12 HOURS
Refills: 0 | Status: DISCONTINUED | OUTPATIENT
Start: 2025-05-22 | End: 2025-05-27

## 2025-05-22 RX ORDER — METOPROLOL SUCCINATE 50 MG/1
12.5 TABLET, EXTENDED RELEASE ORAL EVERY 12 HOURS
Refills: 0 | Status: DISCONTINUED | OUTPATIENT
Start: 2025-05-22 | End: 2025-05-22

## 2025-05-22 RX ORDER — SENNA 187 MG
2 TABLET ORAL AT BEDTIME
Refills: 0 | Status: DISCONTINUED | OUTPATIENT
Start: 2025-05-22 | End: 2025-05-27

## 2025-05-22 RX ORDER — POLYETHYLENE GLYCOL 3350 17 G/17G
17 POWDER, FOR SOLUTION ORAL EVERY 24 HOURS
Refills: 0 | Status: DISCONTINUED | OUTPATIENT
Start: 2025-05-22 | End: 2025-05-27

## 2025-05-22 RX ORDER — MAGNESIUM SULFATE 500 MG/ML
2 SYRINGE (ML) INJECTION ONCE
Refills: 0 | Status: COMPLETED | OUTPATIENT
Start: 2025-05-22 | End: 2025-05-22

## 2025-05-22 RX ADMIN — ATORVASTATIN CALCIUM 20 MILLIGRAM(S): 80 TABLET, FILM COATED ORAL at 21:23

## 2025-05-22 RX ADMIN — CARVEDILOL 25 MILLIGRAM(S): 3.12 TABLET, FILM COATED ORAL at 16:59

## 2025-05-22 RX ADMIN — Medication 25 GRAM(S): at 04:52

## 2025-05-22 RX ADMIN — Medication 100 MILLIGRAM(S): at 12:18

## 2025-05-22 RX ADMIN — POLYETHYLENE GLYCOL 3350 17 GRAM(S): 17 POWDER, FOR SOLUTION ORAL at 12:18

## 2025-05-22 RX ADMIN — Medication 3 MILLIGRAM(S): at 21:24

## 2025-05-22 RX ADMIN — FINASTERIDE 5 MILLIGRAM(S): 1 TABLET, FILM COATED ORAL at 12:17

## 2025-05-22 RX ADMIN — INSULIN LISPRO 5 UNIT(S): 100 INJECTION, SOLUTION INTRAVENOUS; SUBCUTANEOUS at 12:19

## 2025-05-22 RX ADMIN — Medication 81 MILLIGRAM(S): at 12:17

## 2025-05-22 RX ADMIN — Medication 20 MILLIEQUIVALENT(S): at 04:51

## 2025-05-22 RX ADMIN — Medication 1 APPLICATION(S): at 12:20

## 2025-05-22 RX ADMIN — INSULIN LISPRO 1: 100 INJECTION, SOLUTION INTRAVENOUS; SUBCUTANEOUS at 12:19

## 2025-05-22 RX ADMIN — INSULIN LISPRO 1: 100 INJECTION, SOLUTION INTRAVENOUS; SUBCUTANEOUS at 16:58

## 2025-05-22 RX ADMIN — Medication 50 MICROGRAM(S): at 04:51

## 2025-05-22 RX ADMIN — Medication 20 MILLIEQUIVALENT(S): at 08:05

## 2025-05-22 RX ADMIN — Medication 20 MILLIEQUIVALENT(S): at 06:14

## 2025-05-22 RX ADMIN — INSULIN LISPRO 5 UNIT(S): 100 INJECTION, SOLUTION INTRAVENOUS; SUBCUTANEOUS at 08:03

## 2025-05-22 RX ADMIN — Medication 2 TABLET(S): at 21:24

## 2025-05-22 RX ADMIN — QUETIAPINE FUMARATE 12.5 MILLIGRAM(S): 25 TABLET ORAL at 21:24

## 2025-05-22 RX ADMIN — LISINOPRIL 5 MILLIGRAM(S): 5 TABLET ORAL at 04:51

## 2025-05-22 RX ADMIN — METOPROLOL SUCCINATE 12.5 MILLIGRAM(S): 50 TABLET, EXTENDED RELEASE ORAL at 04:50

## 2025-05-22 RX ADMIN — Medication 975 MILLIGRAM(S): at 01:33

## 2025-05-22 RX ADMIN — INSULIN GLARGINE-YFGN 20 UNIT(S): 100 INJECTION, SOLUTION SUBCUTANEOUS at 21:25

## 2025-05-22 RX ADMIN — ENOXAPARIN SODIUM 40 MILLIGRAM(S): 100 INJECTION SUBCUTANEOUS at 04:50

## 2025-05-22 RX ADMIN — INSULIN LISPRO 5 UNIT(S): 100 INJECTION, SOLUTION INTRAVENOUS; SUBCUTANEOUS at 16:57

## 2025-05-22 NOTE — PROGRESS NOTE ADULT - ASSESSMENT
ASSESSMENT:  LAZARA HAWLEY is a 74y Male PMH CVA, CAD s/p PCI, DM, HTN, who initially underwent for elective cystoscopy with CBI and TURP in [place] on 5/15 c/w bladder rupture and septic shock, transferred to [facility] s/p ex lap with bladder repair and SPT placement on 5/16, post-op admitted to SICU for vent depended respiratory failure and septic shock. Currently septic shock is resolved.         PLAN:    NEURO: A & O x3, extubated  - multimodal pain control  - delirium precaution    RESPIRATORY: extubated   - Maintain SAO2>92%    CARDIOVASCULAR: bradycardia resolved, off pressors, BIJAL reviewed, metoprolol resumed  - trend trop  - MAP goal >65  - cardiology consulted, no acute intervention, consulted interventional cards for possible cath this admission, continue ASA    GI/NUTRITION: s/p exlap, continue diet  - continue bowel regiment     GENITOURINARY/RENAL: s/p TURP and bladder repair with SPT and stout  - Monitor I/Os  - Trend BUN/Cr daily  - Trend electrolytes, replete PRN    HEMATOLOGIC: no active issue  - Trend CBC, Coags daily  - VTE ppx: SCDs, chemical DVT ppx    INFECTIOUS DISEASE: s/p bladder rupture and repair, finished abx course 5/20/25  - Trend WBC, monitor for signs of infection  - no indication for abx    ENDOCRINE: 5U admelog TID premeal added to lantis   - will continue to monitor sugars   - on synthroid    MUSCULOSKELETAL: PT/OOBTC    LINES/TUBES/DRAINS: PIVs, stout, SPT    DISPO: downgrade     CODE STATUS: FULL    Patient required critical care management and is at risk for life threatening decompensation  I provided ________of non-continuous care to the patient.   ASSESSMENT:  LAZARA HAWLEY is a 74y Male PMH CVA, CAD s/p PCI, DM, HTN, who initially underwent for elective cystoscopy with CBI and TURP in [place] on 5/15 c/w bladder rupture and septic shock, transferred to [facility] s/p ex lap with bladder repair and SPT placement on 5/16, post-op admitted to SICU for vent depended respiratory failure and septic shock. Currently recovered well.         PLAN:    NEURO: A & O x3, extubated  - multimodal pain control  - delirium precaution    RESPIRATORY: extubated   - Maintain SAO2>92%    CARDIOVASCULAR: bradycardia resolved, off pressors, BIJAL reviewed, metoprolol resumed  - trend trop  - MAP goal >65  - cardiology consulted, no acute intervention, consulted interventional cards for possible cath this admission, continue ASA    GI/NUTRITION: s/p exlap, continue diet  - continue bowel regiment     GENITOURINARY/RENAL: s/p TURP and bladder repair with SPT and stout  - Monitor I/Os  - Trend BUN/Cr daily  - Trend electrolytes, replete PRN    HEMATOLOGIC: no active issue  - Trend CBC, Coags daily  - VTE ppx: SCDs, chemical DVT ppx    INFECTIOUS DISEASE: s/p bladder rupture and repair, finished abx course 5/20/25  - Trend WBC, monitor for signs of infection  - no indication for abx    ENDOCRINE: 5U admelog TID premeal added to lantus   - will continue to monitor sugars   - on synthroid    MUSCULOSKELETAL: PT/OOBTC    LINES/TUBES/DRAINS: PIVs, stout, SPT    DISPO: pt is medically cleared to be downgraded to floor under medicine service     CODE STATUS: FULL

## 2025-05-22 NOTE — PHYSICAL THERAPY INITIAL EVALUATION ADULT - ADDITIONAL COMMENTS
as per wife who provided hx, pt came from home and lives with wife and son in private house, pt requires assistance for ADLs and transfers (able to perform stand-pivot to WC), owns a WC and wide quad cane, uses a RUE sling and right AFO

## 2025-05-22 NOTE — PHYSICAL THERAPY INITIAL EVALUATION ADULT - LEVEL OF INDEPENDENCE, REHAB EVAL
pt declined performing due to pain when lifting LLE and passively. Pt reported also feeling fatigued/unable to perform

## 2025-05-22 NOTE — PHYSICAL THERAPY INITIAL EVALUATION ADULT - ACTIVE RANGE OF MOTION EXAMINATION, REHAB EVAL
decreased RUE/RLE AROM/Left UE Active ROM was WFL (within functional limits)/Left LE Active ROM was WFL (within functional limits)/deficits as listed below

## 2025-05-22 NOTE — PROGRESS NOTE ADULT - PROBLEM SELECTOR PLAN 1
.  - presented for hypotension requiring vasopressor support  - TTE performed nondiagnostic due to free air obstructing view  - now s/p BIJAL 5/20/25:   EF 45-50%, (Newly reduced from baseline of 60%), apical anterior wall motion hypokinesis.  - appears fluid volume overloaded on exam, can use lasix IVP as needed for fluid management  - still with some bradycardia overnight to 50s, but need for BP control and having frequent PVCs  - recommend change metoprolol back to Coreg 25mg PO BID with hold parameters for further BP control  - Monitor on telemetry.  Strict i/o and daily weights.  Keep K > 4, Mg > 2.  Monitor renal function. .  - presented for hypotension requiring vasopressor support  - TTE performed nondiagnostic due to free air obstructing view  - now s/p BIJAL 5/20/25:   EF 45-50%, (Newly reduced from baseline of 60%), apical anterior wall motion hypokinesis.  - still with some bradycardia overnight to 50s, but need for BP control and having frequent PVCs  - recommend change metoprolol back to Coreg 25mg PO BID with hold parameters for further BP control  - Monitor on telemetry.  Strict i/o and daily weights.  Keep K > 4, Mg > 2.  Monitor renal function. .  - presented for hypotension requiring vasopressor support  - TTE performed nondiagnostic due to free air obstructing view  - now s/p BIJAL 5/20/25:   EF 45-50%, (Newly reduced from baseline of 60%), apical anterior wall motion hypokinesis.  - still with some bradycardia overnight to 50s, but need for BP control and having frequent PVCs  - recommend change metoprolol back to Coreg 25mg PO BID with hold parameters for further BP control  - Monitor on telemetry.  Strict i/o and daily weights.  Keep K > 4, Mg > 2.  Monitor renal function.  - possibly with Afib on tele? continue to monitor, daily EKGS

## 2025-05-22 NOTE — PROGRESS NOTE ADULT - SUBJECTIVE AND OBJECTIVE BOX
[facility]                                                         CARDIOLOGY AT [facility]                                                                  [address] [address]                                                         Telephone: [phone]. Fax:[phone]                                                                             PROGRESS NOTE    Reason for follow up: HFmrEF, elevated troponin  Update: now extubated. HStnt now rising again 291->362->364. Will obtain interventional cardiology consult       Review of symptoms:   Cardiac:  No chest pain. No dyspnea. No palpitations.  Respiratory: no cough. No dyspnea  Gastrointestinal: No diarrhea. No abdominal pain. No bleeding.   Neuro: No focal neuro complaints.    Vitals:  T(C): 37 (05-22-25 @ 08:00), Max: 37.8 (05-21-25 @ 20:00)  HR: 74 (05-22-25 @ 08:00) (59 - 100)  BP: 161/77 (05-22-25 @ 08:00) (129/62 - 177/100)  RR: 40 (05-22-25 @ 08:00) (12 - 40)  SpO2: 99% (05-22-25 @ 08:00) (98% - 100%)  Wt(kg): --  I&O's Summary    21 May 2025 07:01  -  22 May 2025 07:00  --------------------------------------------------------  IN: 80 mL / OUT: 4515 mL / NET: -4435 mL    22 May 2025 07:01  -  22 May 2025 08:44  --------------------------------------------------------  IN: 0 mL / OUT: 160 mL / NET: -160 mL          PHYSICAL EXAM:  Appearance: Comfortable. No acute distress  HEENT:  Hypophonic, Atraumatic. Normocephalic.  Normal oral mucosa  Neurologic: A & O x 3, no gross focal deficits.  Cardiovascular: RRR S1 S2, No murmur, no rubs/gallops. No JVD  Respiratory: Lungs clear to auscultation, unlabored   Gastrointestinal:  Soft, Non-tender, + BS  Lower Extremities: 2+ Peripheral Pulses, No clubbing, cyanosis, or edema  Psychiatry: Patient is calm. No agitation.   Skin: warm and dry.    CURRENT CARDIAC MEDICATIONS:  lisinopril 5 milliGRAM(s) Oral daily  metoprolol tartrate 12.5 milliGRAM(s) Oral every 12 hours      CURRENT OTHER MEDICATIONS:  acetaminophen     Tablet .. 975 milliGRAM(s) Oral every 6 hours PRN Mild Pain (1 - 3), Moderate Pain (4 - 6), Severe Pain (7 - 10)  melatonin 3 milliGRAM(s) Oral at bedtime  QUEtiapine 12.5 milliGRAM(s) Oral at bedtime  allopurinol 100 milliGRAM(s) Oral daily  atorvastatin 20 milliGRAM(s) Oral at bedtime  finasteride 5 milliGRAM(s) Oral daily  insulin glargine Injectable (LANTUS) 20 Unit(s) SubCutaneous at bedtime  insulin lispro (ADMELOG) corrective regimen sliding scale   SubCutaneous Before meals and at bedtime  insulin lispro Injectable (ADMELOG) 5 Unit(s) SubCutaneous three times a day before meals  levothyroxine 50 MICROGram(s) Oral daily  aspirin  chewable 81 milliGRAM(s) Oral daily  chlorhexidine 2% Cloths 1 Application(s) Topical daily  enoxaparin Injectable 40 milliGRAM(s) SubCutaneous every 24 hours      LABS:	 	                            8.9    6.89  )-----------( 158      ( 22 May 2025 03:00 )             28.2     05-22    144  |  108  |  17.6  ----------------------------<  151[H]  3.5   |  24.0  |  0.70    Ca    7.7[L]      22 May 2025 03:00  Phos  3.4     05-22  Mg     1.8     05-22    TPro  4.6[L]  /  Alb  2.5[L]  /  TBili  0.4  /  DBili  x   /  AST  11  /  ALT  17  /  AlkPhos  80  05-22    PT/INR/PTT ( 17 May 2025 05:00 )                       :                       :      16.5         :       24.6                  .        .                   .              .           .       1.43        .                                       Lipid Profile:   HgA1c:   TSH: Thyroid Stimulating Hormone, Serum: 4.97 uIU/mL      TELEMETRY: NSR, epsiode of ST overnight  ECG:    DIAGNOSTIC TESTING:  [ ] Echocardiogram:   < from: BIJAL W or WO Ultrasound Enhancing Agent (05.20.25 @ 08:11) >    ADDENDUM TO PRIOR ECHOCARDIOGRAM REPORT        Baltazar Avila on 5/20/2025 at 10:44:43 AM                       TRANSESOPHAGEAL ECHOCARDIOGRAM REPORT  ________________________________________________________________________________                      _______       Pt. Name:       LAZARA HAWLEY Study Date:    5/20/2025  MRN:            [id number]        YOB: 1950  Accession #:    [id number]       Age:           74 years  Account#:       [id number]      Gender:   M  Heart Rate:                     Height:        79.00 in (200.66 cm)  Rhythm:                         Weight:        286.62 lb (130.01 kg)  Blood Pressure: 130/80 mmHg     BSA/BMI:       2.66 m² / 32.29 kg/m²  ________________________________________________________________________________________  Referring Physician:    [id number] Wild Bey  Interpreting Physician: Baltazar Avila  Primary Sonographer:    Juventino Sarmiento    CPT:               ECHO TRANSESOPH W/O CON - 89685.m;DOPPLER ECHO COMP W SPECT -                     45141.m;DOPPL ECHO COLOR FLOW - 36360.m  Indication(s):     Abnormal electrocardiogram ECG EKG - R94.31  Procedure:         Transesophageal echocardiogram performed with 2D, M-mode and                     complete spectral and color flow Doppler.  Ordering Location: [facility]  Admission Status:  Inpatient    _______________________________________________________________________________________     CONCLUSIONS:      1. Left ventricular systolic function is mildlydecreased with an ejection fraction visually estimated at 45 to 50 %. Regional wall motion abnormalities present.   2. Apical anterior segment is abnormal.   3. No evidence of left atrial or left atrial appendage thrombus.    < end of copied text >  [ ]  Catheterization:  [ ] Stress Test:    OTHER: 	                                                                [facility]                                                         CARDIOLOGY AT [address] [address]                                                         Telephone: [phone]. Fax:[phone]                                                                             PROGRESS NOTE    Reason for follow up: HFmrEF, elevated troponin  Update: now extubated. HStnt now rising again 291->362->364. Will obtain interventional cardiology consult. Records received from OP office      Review of symptoms:   Cardiac:  No chest pain. No dyspnea. No palpitations.  Respiratory: no cough. No dyspnea  Gastrointestinal: No diarrhea. No abdominal pain. No bleeding.   Neuro: No focal neuro complaints.    Vitals:  T(C): 37 (05-22-25 @ 08:00), Max: 37.8 (05-21-25 @ 20:00)  HR: 74 (05-22-25 @ 08:00) (59 - 100)  BP: 161/77 (05-22-25 @ 08:00) (129/62 - 177/100)  RR: 40 (05-22-25 @ 08:00) (12 - 40)  SpO2: 99% (05-22-25 @ 08:00) (98% - 100%)  Wt(kg): --  I&O's Summary    21 May 2025 07:01  -  22 May 2025 07:00  --------------------------------------------------------  IN: 80 mL / OUT: 4515 mL / NET: -4435 mL    22 May 2025 07:01  -  22 May 2025 08:44  --------------------------------------------------------  IN: 0 mL / OUT: 160 mL / NET: -160 mL          PHYSICAL EXAM:  Appearance: Comfortable. No acute distress  HEENT:  Hypophonic, Atraumatic. Normocephalic.  Normal oral mucosa  Neurologic: A & O x 3, no gross focal deficits.  Cardiovascular: RRR S1 S2, No murmur, no rubs/gallops. No JVD  Respiratory: Lungs clear to auscultation, unlabored   Gastrointestinal:  Soft, Non-tender, + BS  Lower Extremities: 2+ Peripheral Pulses, No clubbing, cyanosis, or edema  Psychiatry: Patient is calm. No agitation.   Skin: warm and dry.    CURRENT CARDIAC MEDICATIONS:  lisinopril 5 milliGRAM(s) Oral daily  metoprolol tartrate 12.5 milliGRAM(s) Oral every 12 hours      CURRENT OTHER MEDICATIONS:  acetaminophen     Tablet .. 975 milliGRAM(s) Oral every 6 hours PRN Mild Pain (1 - 3), Moderate Pain (4 - 6), Severe Pain (7 - 10)  melatonin 3 milliGRAM(s) Oral at bedtime  QUEtiapine 12.5 milliGRAM(s) Oral at bedtime  allopurinol 100 milliGRAM(s) Oral daily  atorvastatin 20 milliGRAM(s) Oral at bedtime  finasteride 5 milliGRAM(s) Oral daily  insulin glargine Injectable (LANTUS) 20 Unit(s) SubCutaneous at bedtime  insulin lispro (ADMELOG) corrective regimen sliding scale   SubCutaneous Before meals and at bedtime  insulin lispro Injectable (ADMELOG) 5 Unit(s) SubCutaneous three times a day before meals  levothyroxine 50 MICROGram(s) Oral daily  aspirin  chewable 81 milliGRAM(s) Oral daily  chlorhexidine 2% Cloths 1 Application(s) Topical daily  enoxaparin Injectable 40 milliGRAM(s) SubCutaneous every 24 hours      LABS:	 	                            8.9    6.89  )-----------( 158      ( 22 May 2025 03:00 )             28.2     05-22    144  |  108  |  17.6  ----------------------------<  151[H]  3.5   |  24.0  |  0.70    Ca    7.7[L]      22 May 2025 03:00  Phos  3.4     05-22  Mg     1.8     05-22    TPro  4.6[L]  /  Alb  2.5[L]  /  TBili  0.4  /  DBili  x   /  AST  11  /  ALT  17  /  AlkPhos  80  05-22    PT/INR/PTT ( 17 May 2025 05:00 )                       :                       :      16.5         :       24.6                  .        .                   .              .           .       1.43        .                                       Lipid Profile:   HgA1c:   TSH: Thyroid Stimulating Hormone, Serum: 4.97 uIU/mL      TELEMETRY: NSR, epsiode of ST overnight  ECG:    DIAGNOSTIC TESTING:  [ ] Echocardiogram:   < from: BIJAL W or WO Ultrasound Enhancing Agent (05.20.25 @ 08:11) >    ADDENDUM TO PRIOR ECHOCARDIOGRAM REPORT        Baltazar Avila on 5/20/2025 at 10:44:43 AM                       TRANSESOPHAGEAL ECHOCARDIOGRAM REPORT  ________________________________________________________________________________                      _______       Pt. Name:       LAZARA HAWLEY Study Date:    5/20/2025  MRN:            [id number]        YOB: 1950  Accession #:    [id number]       Age:           74 years  Account#:       [id number]      Gender:   M  Heart Rate:                     Height:        79.00 in (200.66 cm)  Rhythm:                         Weight:        286.62 lb (130.01 kg)  Blood Pressure: 130/80 mmHg     BSA/BMI:       2.66 m² / 32.29 kg/m²  ________________________________________________________________________________________  Referring Physician:    [id number] Wild Bey  Interpreting Physician: Baltazar Avila  Primary Sonographer:    Juventino Sarmiento    CPT:               ECHO TRANSESOPH W/O CON - 40136.m;DOPPLER ECHO COMP W SPECT -                     26421.m;DOPPL ECHO COLOR FLOW - 81560.m  Indication(s):     Abnormal electrocardiogram ECG EKG - R94.31  Procedure:         Transesophageal echocardiogram performed with 2D, M-mode and                     complete spectral and color flow Doppler.  Ordering Location: [facility]  Admission Status:  Inpatient    _______________________________________________________________________________________     CONCLUSIONS:      1. Left ventricular systolic function is mildly decreased with an ejection fraction visually estimated at 45 to 50 %. Regional wall motion abnormalities present.   2. Apical anterior segment is abnormal.   3. No evidence of left atrial or left atrial appendage thrombus.    < end of copied text >  [ ]  Catheterization: [facility] 5/13/2019: LM normal LAD normal, pCirc 70%, dCirc 100%, collaterals from first marginal, mRCA 90%, rPLV 80%  [ ] Stress Test:    OTHER:

## 2025-05-22 NOTE — PROGRESS NOTE ADULT - ASSESSMENT
73 yo male Tx from [place], after found to have bladder perforation, b/l pneumothoraces s/p right chest pigtail catheter, since removed, pneumomediastinum NSTEMI, fluid overload, POD#5 s/p ex-lap, repair of bladder perforation, insertion of SPT  - tolerating diet  - cont stout catheter- do not remove  - monitor JOSHUA output (decreasing)  - oob to chair  - recommend transfer to medical service at this point if stable for downgrade to floor  - pt surgically stable from  standpoint  - lasix for fluid overload, will need cardiac cath when stable as per cardiology

## 2025-05-22 NOTE — PROGRESS NOTE ADULT - TIME BILLING
Labs/Imaging/Notes reviewed. Discussed with Urology. SICU reccs appreciated. Cardio and Interventional Cardio reccs appreciated.

## 2025-05-22 NOTE — PROGRESS NOTE ADULT - SUBJECTIVE AND OBJECTIVE BOX
INTERVAL EVENTS:    [ ] Due to altered mental status/intubation, subjective information were not able to be obtained from the patient. History was obtained, to the extent possible, from review of the chart and collateral sources of information.    OBJECTIVE:    VITALS:  Vital Signs Last 24 Hrs  T(C): 37 (22 May 2025 07:00), Max: 37.8 (21 May 2025 20:00)  T(F): 98.6 (22 May 2025 07:00), Max: 100 (21 May 2025 20:00)  HR: 78 (22 May 2025 07:00) (59 - 110)  BP: 140/67 (22 May 2025 07:00) (129/62 - 177/100)  BP(mean): 89 (22 May 2025 07:00) (82 - 122)  RR: 19 (22 May 2025 07:00) (12 - 35)  SpO2: 100% (22 May 2025 07:00) (98% - 100%)    Parameters below as of 22 May 2025 04:00  Patient On (Oxygen Delivery Method): nasal cannula  O2 Flow (L/min): 4    Mode: CPAP with PS  FiO2: 40  PEEP: 6  PS: 6  MAP: 9    I&O's Summary    21 May 2025 07:01  -  22 May 2025 07:00  --------------------------------------------------------  IN: 80 mL / OUT: 4515 mL / NET: -4435 mL    22 May 2025 07:01  -  22 May 2025 08:21  --------------------------------------------------------  IN: 0 mL / OUT: 160 mL / NET: -160 mL        PHYSICAL EXAM:    Constitutional: NAD, well-groomed, well-developed  HEENT: PERRLA, EOMI, no drainage or redness  Neck: supple,  No JVD  Respiratory: Breath Sounds equal & clear bilaterally to auscultation, no rales/rhonchi/wheezing, no accessory muscle use noted  Cardiovascular: Regular rate, regular rhythm, normal S1, S2; no murmurs or rub  Gastrointestinal: Soft, non-tender, non distended, + bowel sounds.   Extremities: ALMARAZ x 4, no peripheral edema, no cyanosis, no clubbing   Neurological: A+O x 3; speech clear and intact; no sensory, motor  deficits  Skin: warm, dry, well perfused    ACCESS DEVICES:  [ ] Peripheral IV  [ ] Central Venous Line	[ ] R	[ ] L	[ ] IJ	[ ] Fem	[ ] SC	Placed:   [ ] Arterial Line		[ ] R	[ ] L	[ ] Fem	[ ] Rad	[ ] Ax	Placed:   [ ] PICC:					[ ] Mediport  [ ] Urinary Catheter, Date Placed:   [x] Necessity of urinary, arterial, and venous catheters discussed      LABS:  CAPILLARY BLOOD GLUCOSE      POCT Blood Glucose.: 139 mg/dL (22 May 2025 08:00)  POCT Blood Glucose.: 205 mg/dL (21 May 2025 22:05)  POCT Blood Glucose.: 186 mg/dL (21 May 2025 17:50)  POCT Blood Glucose.: 202 mg/dL (21 May 2025 11:44)                          8.9    6.89  )-----------( 158      ( 22 May 2025 03:00 )             28.2     05-22    144  |  108  |  17.6  ----------------------------<  151[H]  3.5   |  24.0  |  0.70    Ca    7.7[L]      22 May 2025 03:00  Phos  3.4     05-22  Mg     1.8     05-22    TPro  4.6[L]  /  Alb  2.5[L]  /  TBili  0.4  /  DBili  x   /  AST  11  /  ALT  17  /  AlkPhos  80  05-22        Mode: CPAP with PS, RR (patient): 18, FiO2: 40, PEEP: 6, PS: 6, MAP: 9  ABG - ( 21 May 2025 05:30 )  pH, Arterial: 7.440 pH, Blood: x     /  pCO2: 34    /  pO2: 136   / HCO3: 23    / Base Excess: -1.1  /  SaO2: 99.5              Urinalysis Basic - ( 22 May 2025 03:00 )    Color: x / Appearance: x / SG: x / pH: x  Gluc: 151 mg/dL / Ketone: x  / Bili: x / Urobili: x   Blood: x / Protein: x / Nitrite: x   Leuk Esterase: x / RBC: x / WBC x   Sq Epi: x / Non Sq Epi: x / Bacteria: x      Mode: CPAP with PS, RR (patient): 18, FiO2: 40, PEEP: 6, PS: 6, MAP: 9      MEDICATIONS:   MEDICATIONS  (STANDING):  allopurinol 100 milliGRAM(s) Oral daily  aspirin  chewable 81 milliGRAM(s) Oral daily  atorvastatin 20 milliGRAM(s) Oral at bedtime  chlorhexidine 2% Cloths 1 Application(s) Topical daily  enoxaparin Injectable 40 milliGRAM(s) SubCutaneous every 24 hours  finasteride 5 milliGRAM(s) Oral daily  insulin glargine Injectable (LANTUS) 20 Unit(s) SubCutaneous at bedtime  insulin lispro (ADMELOG) corrective regimen sliding scale   SubCutaneous Before meals and at bedtime  insulin lispro Injectable (ADMELOG) 5 Unit(s) SubCutaneous three times a day before meals  levothyroxine 50 MICROGram(s) Oral daily  lisinopril 5 milliGRAM(s) Oral daily  melatonin 3 milliGRAM(s) Oral at bedtime  metoprolol tartrate 12.5 milliGRAM(s) Oral every 12 hours  QUEtiapine 12.5 milliGRAM(s) Oral at bedtime    MEDICATIONS  (PRN):  acetaminophen     Tablet .. 975 milliGRAM(s) Oral every 6 hours PRN Mild Pain (1 - 3), Moderate Pain (4 - 6), Severe Pain (7 - 10)   INTERVAL EVENTS:    - Extubated yesterday, doing well on 4L NC for comfort, will transition to room air.   - Diuril and 20 lasix given yesterday, net negative 4L.  - Troponin uptrending, likely demand ischemia, repeat AM labs pending, cardiology following, cardiology consulted interventional cardiology for possible cath this admission, cardiology following.     OBJECTIVE:    VITALS:  Vital Signs Last 24 Hrs  T(C): 37 (22 May 2025 07:00), Max: 37.8 (21 May 2025 20:00)  T(F): 98.6 (22 May 2025 07:00), Max: 100 (21 May 2025 20:00)  HR: 78 (22 May 2025 07:00) (59 - 110)  BP: 140/67 (22 May 2025 07:00) (129/62 - 177/100)  BP(mean): 89 (22 May 2025 07:00) (82 - 122)  RR: 19 (22 May 2025 07:00) (12 - 35)  SpO2: 100% (22 May 2025 07:00) (98% - 100%)    Parameters below as of 22 May 2025 04:00  Patient On (Oxygen Delivery Method): nasal cannula  O2 Flow (L/min): 4    Mode: CPAP with PS  FiO2: 40  PEEP: 6  PS: 6  MAP: 9    I&O's Summary    21 May 2025 07:01  -  22 May 2025 07:00  --------------------------------------------------------  IN: 80 mL / OUT: 4515 mL / NET: -4435 mL    22 May 2025 07:01  -  22 May 2025 08:21  --------------------------------------------------------  IN: 0 mL / OUT: 160 mL / NET: -160 mL        PHYSICAL EXAM:    Constitutional: awake, sitting comfortably.  Respiratory: Breath Sounds equal & clear bilaterally to auscultation, no accessory muscle use noted  Cardiovascular: HR 60-80s regular rhythm, no pressors  Gastrointestinal: Soft, non-tender, non distended, SBT in place  Extremities: ALMARAZ x 4, positive peripheral edema   Neurological: A+O x 3; no sensory, motor  deficits      ACCESS DEVICES:  [x] Peripheral IV  [ ] Central Venous Line	[ ] R	[ ] L	[ ] IJ	[ ] Fem	[ ] SC	Placed:   [ ] Arterial Line		[ ] R	[ ] L	[ ] Fem	[ ] Rad	[ ] Ax	Placed:   [ ] PICC:					[ ] Mediport  [x] Urinary Catheter, Date Placed:   [x] Necessity of urinary, arterial, and venous catheters discussed      LABS:  CAPILLARY BLOOD GLUCOSE      POCT Blood Glucose.: 139 mg/dL (22 May 2025 08:00)  POCT Blood Glucose.: 205 mg/dL (21 May 2025 22:05)  POCT Blood Glucose.: 186 mg/dL (21 May 2025 17:50)  POCT Blood Glucose.: 202 mg/dL (21 May 2025 11:44)                          8.9    6.89  )-----------( 158      ( 22 May 2025 03:00 )             28.2     05-22    144  |  108  |  17.6  ----------------------------<  151[H]  3.5   |  24.0  |  0.70    Ca    7.7[L]      22 May 2025 03:00  Phos  3.4     05-22  Mg     1.8     05-22    TPro  4.6[L]  /  Alb  2.5[L]  /  TBili  0.4  /  DBili  x   /  AST  11  /  ALT  17  /  AlkPhos  80  05-22        Mode: CPAP with PS, RR (patient): 18, FiO2: 40, PEEP: 6, PS: 6, MAP: 9  ABG - ( 21 May 2025 05:30 )  pH, Arterial: 7.440 pH, Blood: x     /  pCO2: 34    /  pO2: 136   / HCO3: 23    / Base Excess: -1.1  /  SaO2: 99.5              Urinalysis Basic - ( 22 May 2025 03:00 )    Color: x / Appearance: x / SG: x / pH: x  Gluc: 151 mg/dL / Ketone: x  / Bili: x / Urobili: x   Blood: x / Protein: x / Nitrite: x   Leuk Esterase: x / RBC: x / WBC x   Sq Epi: x / Non Sq Epi: x / Bacteria: x      Mode: CPAP with PS, RR (patient): 18, FiO2: 40, PEEP: 6, PS: 6, MAP: 9      MEDICATIONS:   MEDICATIONS  (STANDING):  allopurinol 100 milliGRAM(s) Oral daily  aspirin  chewable 81 milliGRAM(s) Oral daily  atorvastatin 20 milliGRAM(s) Oral at bedtime  chlorhexidine 2% Cloths 1 Application(s) Topical daily  enoxaparin Injectable 40 milliGRAM(s) SubCutaneous every 24 hours  finasteride 5 milliGRAM(s) Oral daily  insulin glargine Injectable (LANTUS) 20 Unit(s) SubCutaneous at bedtime  insulin lispro (ADMELOG) corrective regimen sliding scale   SubCutaneous Before meals and at bedtime  insulin lispro Injectable (ADMELOG) 5 Unit(s) SubCutaneous three times a day before meals  levothyroxine 50 MICROGram(s) Oral daily  lisinopril 5 milliGRAM(s) Oral daily  melatonin 3 milliGRAM(s) Oral at bedtime  metoprolol tartrate 12.5 milliGRAM(s) Oral every 12 hours  QUEtiapine 12.5 milliGRAM(s) Oral at bedtime    MEDICATIONS  (PRN):  acetaminophen     Tablet .. 975 milliGRAM(s) Oral every 6 hours PRN Mild Pain (1 - 3), Moderate Pain (4 - 6), Severe Pain (7 - 10)   INTERVAL EVENTS:    - Extubated yesterday, doing well on 4L NC for comfort, will transition to room air.   - Diuril and 20 lasix given yesterday, net negative 4L.  - Troponin uptrending, likely demand ischemia, repeat AM labs pending, cardiology following, cardiology consulted interventional cardiology for possible cath this admission, cardiology following.     OBJECTIVE:    VITALS:  Vital Signs Last 24 Hrs  T(C): 37 (22 May 2025 07:00), Max: 37.8 (21 May 2025 20:00)  T(F): 98.6 (22 May 2025 07:00), Max: 100 (21 May 2025 20:00)  HR: 78 (22 May 2025 07:00) (59 - 110)  BP: 140/67 (22 May 2025 07:00) (129/62 - 177/100)  BP(mean): 89 (22 May 2025 07:00) (82 - 122)  RR: 19 (22 May 2025 07:00) (12 - 35)  SpO2: 100% (22 May 2025 07:00) (98% - 100%)    Parameters below as of 22 May 2025 04:00  Patient On (Oxygen Delivery Method): nasal cannula  O2 Flow (L/min): 4    Mode: CPAP with PS  FiO2: 40  PEEP: 6  PS: 6  MAP: 9    I&O's Summary    21 May 2025 07:01  -  22 May 2025 07:00  --------------------------------------------------------  IN: 80 mL / OUT: 4515 mL / NET: -4435 mL    22 May 2025 07:01  -  22 May 2025 08:21  --------------------------------------------------------  IN: 0 mL / OUT: 160 mL / NET: -160 mL        PHYSICAL EXAM:    Constitutional: awake, sitting comfortably.  Respiratory: Breath Sounds equal & clear bilaterally to auscultation, no accessory muscle use noted  Cardiovascular: HR 60-80s regular rhythm, no pressors  Gastrointestinal: Soft, non-tender, non distended, SPT in place  Extremities: ALMARAZ x 4, positive peripheral edema   Neurological: A+O x 3; no sensory, motor  deficits      ACCESS DEVICES:  [x] Peripheral IV  [ ] Central Venous Line	[ ] R	[ ] L	[ ] IJ	[ ] Fem	[ ] SC	Placed:   [ ] Arterial Line		[ ] R	[ ] L	[ ] Fem	[ ] Rad	[ ] Ax	Placed:   [ ] PICC:					[ ] Mediport  [x] Urinary Catheter, Date Placed:   [x] Necessity of urinary, arterial, and venous catheters discussed      LABS:  CAPILLARY BLOOD GLUCOSE      POCT Blood Glucose.: 139 mg/dL (22 May 2025 08:00)  POCT Blood Glucose.: 205 mg/dL (21 May 2025 22:05)  POCT Blood Glucose.: 186 mg/dL (21 May 2025 17:50)  POCT Blood Glucose.: 202 mg/dL (21 May 2025 11:44)                          8.9    6.89  )-----------( 158      ( 22 May 2025 03:00 )             28.2     05-22    144  |  108  |  17.6  ----------------------------<  151[H]  3.5   |  24.0  |  0.70    Ca    7.7[L]      22 May 2025 03:00  Phos  3.4     05-22  Mg     1.8     05-22    TPro  4.6[L]  /  Alb  2.5[L]  /  TBili  0.4  /  DBili  x   /  AST  11  /  ALT  17  /  AlkPhos  80  05-22        Mode: CPAP with PS, RR (patient): 18, FiO2: 40, PEEP: 6, PS: 6, MAP: 9  ABG - ( 21 May 2025 05:30 )  pH, Arterial: 7.440 pH, Blood: x     /  pCO2: 34    /  pO2: 136   / HCO3: 23    / Base Excess: -1.1  /  SaO2: 99.5              Urinalysis Basic - ( 22 May 2025 03:00 )    Color: x / Appearance: x / SG: x / pH: x  Gluc: 151 mg/dL / Ketone: x  / Bili: x / Urobili: x   Blood: x / Protein: x / Nitrite: x   Leuk Esterase: x / RBC: x / WBC x   Sq Epi: x / Non Sq Epi: x / Bacteria: x      Mode: CPAP with PS, RR (patient): 18, FiO2: 40, PEEP: 6, PS: 6, MAP: 9      MEDICATIONS:   MEDICATIONS  (STANDING):  allopurinol 100 milliGRAM(s) Oral daily  aspirin  chewable 81 milliGRAM(s) Oral daily  atorvastatin 20 milliGRAM(s) Oral at bedtime  chlorhexidine 2% Cloths 1 Application(s) Topical daily  enoxaparin Injectable 40 milliGRAM(s) SubCutaneous every 24 hours  finasteride 5 milliGRAM(s) Oral daily  insulin glargine Injectable (LANTUS) 20 Unit(s) SubCutaneous at bedtime  insulin lispro (ADMELOG) corrective regimen sliding scale   SubCutaneous Before meals and at bedtime  insulin lispro Injectable (ADMELOG) 5 Unit(s) SubCutaneous three times a day before meals  levothyroxine 50 MICROGram(s) Oral daily  lisinopril 5 milliGRAM(s) Oral daily  melatonin 3 milliGRAM(s) Oral at bedtime  metoprolol tartrate 12.5 milliGRAM(s) Oral every 12 hours  QUEtiapine 12.5 milliGRAM(s) Oral at bedtime    MEDICATIONS  (PRN):  acetaminophen     Tablet .. 975 milliGRAM(s) Oral every 6 hours PRN Mild Pain (1 - 3), Moderate Pain (4 - 6), Severe Pain (7 - 10)

## 2025-05-22 NOTE — PHYSICAL THERAPY INITIAL EVALUATION ADULT - GENERAL OBSERVATIONS, REHAB EVAL
Pt received in bed with 2LO2 NC, cardiac monitor, , catheter, bilateral SCD, foam boots, IV, JOSHUA drain, suction, NAD. Wife present. Pt able to communicate via head nods and mouthing some words. Denied pain pre, 0/10, reported pain when lifting his legs in bed (level not stated, ?/10).

## 2025-05-22 NOTE — PROGRESS NOTE ADULT - ASSESSMENT
74M transferred from [facility] with Hx of CVA (on home plavix) with residual aphasia and R hemiparesis, HTN, HLD, DM2, CAD s/p PCI. Patient had been admitted for continued hematuria and underwent cysto with clot evacuation.   Patient was found to be hypotensive and required pressors.  Also, patient was found to have largeamount of free air and fluid in abdomen due to bladder perforation and bilateral pneumothorax and pneumomediastinum.   Patient had chest tube placed on Right side but was subsequently removed before transferred.  Patient then Admitted to SICU for resuscitation.  With CT findings of air and fluid filled abdomen and bilateral pneumothorax, assumed to be form bladder perforation after cysto and clot evac; patient underwent a complex cystorrhaphy for bladder rupture on 17-May-2025.   04:24:14/ Exploratory laparotomy 17-May-2025.    A cardiac consult for troponin leak and unable to see wall motion on TTE due to air.  Per wife patient has known CAD and had a LHC in 2019 with Dr. Morales and has 2 blockages.  Per wife 100% blockage of dLCX x 2.    Patient is vented and critically ill on pressors, ROS unattainable.

## 2025-05-22 NOTE — PHYSICAL THERAPY INITIAL EVALUATION ADULT - LEVEL OF CONSCIOUSNESS, REHAB EVAL
pt alert when asked to complete motor tasks with LUE/LLE, otherwise lethargic/alert/lethargic/somnolent

## 2025-05-22 NOTE — PHYSICAL THERAPY INITIAL EVALUATION ADULT - PERTINENT HX OF CURRENT PROBLEM, REHAB EVAL
73yo Male with PMH CVA, CAD s/p PCI, DM, HTN, who initially underwent for elective cystoscopy with CBI and TURP in [place] on 5/15 c/w bladder rupture and septic shock, transferred to [facility] s/p ex lap with bladder repair and SPT placement on 5/16, post=op admitted to SICU for vent depended respiratory failure and septic shock. Currently septic shock is resolved. Pt now extubated.

## 2025-05-22 NOTE — CHART NOTE - NSCHARTNOTEFT_GEN_A_CORE
SICU TRANSFER NOTE  -----------------------------  ICU Admission Date: 05-  Transfer Date: 05-22-25 @ 10:56    Admission Diagnosis: Bladder rupture     Active Problems/injuries:   Bladder rupture     Pneumomediastinum     B/l pneumothorax     Pulmonary edema- extrapleural?      Hypoxic respiratory failure     NSTEMI       Procedures:   5/16 - Ex-lap w/ repair of anterior bladder perf. Malencott drain as SPT, 3-way stout in, Alex drain in anterior perivesicular space. 1L IVF, 2 PRBC, 300 UOP     5/20: BIJAL EF 45-50%     Consultants:  [ X ] Cardiology  [ X ] Interventional cardiology   [ X ] Urology       Medications  MEDICATIONS  (STANDING):  allopurinol 100 milliGRAM(s) Oral daily  aspirin  chewable 81 milliGRAM(s) Oral daily  atorvastatin 20 milliGRAM(s) Oral at bedtime  carvedilol 25 milliGRAM(s) Oral every 12 hours  chlorhexidine 2% Cloths 1 Application(s) Topical daily  enoxaparin Injectable 40 milliGRAM(s) SubCutaneous every 24 hours  finasteride 5 milliGRAM(s) Oral daily  insulin glargine Injectable (LANTUS) 20 Unit(s) SubCutaneous at bedtime  insulin lispro (ADMELOG) corrective regimen sliding scale   SubCutaneous Before meals and at bedtime  insulin lispro Injectable (ADMELOG) 5 Unit(s) SubCutaneous three times a day before meals  levothyroxine 50 MICROGram(s) Oral daily  lisinopril 5 milliGRAM(s) Oral daily  melatonin 3 milliGRAM(s) Oral at bedtime  polyethylene glycol 3350 17 Gram(s) Oral every 24 hours  QUEtiapine 12.5 milliGRAM(s) Oral at bedtime  senna 2 Tablet(s) Oral at bedtime    MEDICATIONS  (PRN):  acetaminophen     Tablet .. 975 milliGRAM(s) Oral every 6 hours PRN Mild Pain (1 - 3), Moderate Pain (4 - 6), Severe Pain (7 - 10)        [ X ] I attest I have reviewed and reconciled all medications prior to transfer          I have discussed this case with Urology Team upon transfer and all questions regarding ICU course were answered.  The following items are to be followed up:    ASSESSMENT:  Patient is a 73yo Male, PMHx CVA, CAD, DM, HTN, who initially underwent for elective cystoscopy with CBI and TURP in [place] on 5/15, c/b bladder rupture and septic shock, transferred to [facility] on 5/16, now s/p ex lap with bladder repair and SPT placement on 5/16, post-op admitted to SICU for vent depended respiratory failure and septic shock, since resolved. Course also complicated by HFrEF and NSTEMI.        PLAN:  - Continue pain control with tylenol; delirium precautions, continue Seroquel at bedtime  - AHRF, resolved, extubated successfully on 5/21, net negative 4L following diuresis with lasix and diuril. Continue to wean NC as tolerated  - BIJAL on 5/20 with EF of 45-55%, troponins elevated to 300s, now downtrending, cardiology following, interventional cardiology consult for possible ischemic eval inpatient  - Continue GDMT as tolerated, coreg, lisinopril, statin; consider repeat TTE this admission to re-assess EF; continue ASA  - S/p Ex lap, tolerating CC diet, continue bowel regimen   - s/p TURP and bladder repair with SPT and stout, CTM Is&Os, stout and JOSHUA drain output, replete lytes prn ; net negative 4L, will hold off on further diuresis for now  - Hgb stable, continue DVT ppx with SCDs and Lovenox, continue ASA   - Completed course of Merrem on 5/20, CTM WBC and fever curve off antibiotics for now   -f/u FS and continue lantus and ISS  -resume synthroid SICU TRANSFER NOTE  -----------------------------  ICU Admission Date: 05-  Transfer Date: 05-22-25 @ 10:56    Admission Diagnosis: Bladder rupture     Active Problems/injuries:   Bladder rupture     Pneumomediastinum     B/l pneumothorax     Pulmonary edema- extrapleural?      Hypoxic respiratory failure     NSTEMI       Procedures:   5/16 - Ex-lap w/ repair of anterior bladder perf. Malencott drain as SPT, 3-way stout in, Alex drain in anterior perivesicular space. 1L IVF, 2 PRBC, 300 UOP     5/20: BIJAL EF 45-50%     Consultants:  [ X ] Cardiology  [ X ] Interventional cardiology   [ X ] Urology       Medications  MEDICATIONS  (STANDING):  allopurinol 100 milliGRAM(s) Oral daily  aspirin  chewable 81 milliGRAM(s) Oral daily  atorvastatin 20 milliGRAM(s) Oral at bedtime  carvedilol 25 milliGRAM(s) Oral every 12 hours  chlorhexidine 2% Cloths 1 Application(s) Topical daily  enoxaparin Injectable 40 milliGRAM(s) SubCutaneous every 24 hours  finasteride 5 milliGRAM(s) Oral daily  insulin glargine Injectable (LANTUS) 20 Unit(s) SubCutaneous at bedtime  insulin lispro (ADMELOG) corrective regimen sliding scale   SubCutaneous Before meals and at bedtime  insulin lispro Injectable (ADMELOG) 5 Unit(s) SubCutaneous three times a day before meals  levothyroxine 50 MICROGram(s) Oral daily  lisinopril 5 milliGRAM(s) Oral daily  melatonin 3 milliGRAM(s) Oral at bedtime  polyethylene glycol 3350 17 Gram(s) Oral every 24 hours  QUEtiapine 12.5 milliGRAM(s) Oral at bedtime  senna 2 Tablet(s) Oral at bedtime    MEDICATIONS  (PRN):  acetaminophen     Tablet .. 975 milliGRAM(s) Oral every 6 hours PRN Mild Pain (1 - 3), Moderate Pain (4 - 6), Severe Pain (7 - 10)        [ X ] I attest I have reviewed and reconciled all medications prior to transfer          I have discussed this case with Urology Team upon transfer and all questions regarding ICU course were answered.  The following items are to be followed up:    ASSESSMENT:  Patient is a 75yo Male, PMHx CVA, CAD, DM, HTN, who initially underwent for elective cystoscopy with CBI and TURP in [place] on 5/15, c/b bladder rupture and septic shock, transferred to [facility] on 5/16, now s/p ex lap with bladder repair and SPT placement on 5/16, post-op admitted to SICU for vent depended respiratory failure and septic shock, since resolved. Course also complicated by HFrEF and NSTEMI.        PLAN:  - Continue pain control with tylenol; delirium precautions, continue Seroquel at bedtime  - AHRF, resolved, extubated successfully on 5/21, net negative 4L following diuresis with lasix and diuril. Continue to wean NC as tolerated  - BIJAL on 5/20 with EF of 45-55%, troponins elevated to 300s, now downtrending, cardiology following, interventional cardiology consult for possible ischemic eval inpatient  - Continue GDMT as tolerated, coreg, lisinopril, statin; consider repeat TTE this admission to re-assess EF; continue ASA  - S/p Ex lap, tolerating CC diet, continue bowel regimen   - s/p TURP and bladder repair with SPT and stout, CTM Is&Os, stout and JOSHUA drain output, replete lytes prn ; net negative 4L, will hold off on further diuresis for now  - Hgb stable, continue DVT ppx with SCDs and Lovenox, continue ASA   - Completed course of Merrem on 5/20, CTM WBC and fever curve off antibiotics for now   - Glucose goal < 180, continue 20 lantus, 5u lispro TID, ISS; continue home synthroid    Patient stable for downgrade to telemetry.

## 2025-05-22 NOTE — PROGRESS NOTE ADULT - ASSESSMENT
ASSESSMENT:  74M with PMHX CVA c/b residual aphasia/R hemiparesis on Plavix, CAD s/p PCI, HTN, HLD, DM2 admitted to [place] for hematuria and underwent cystoscopy with clot evacuation c/b septic shock found to have bladder perforation, BL Pneumothorax, and Pneumomediastinum transferred to [facility] SICU for emergent ex-lap and anterior baldder repair 5/17/25 complicated Septic Shock, NSTEMI, and Acute on Chronc HF Exacerbation.    PLAN:  Acute on Chronic HFrEF Exac c/b NSTEMI  -TTE +TDS 2/2 PTX and Pneumomediastinum  -BIJAL +LVEF 45-50 with apical anterior segment RWMA  -Prior CXR increasing L effusion  -EKG NSR 71bpm low voltage +prolonged qtc 530ms abnormal EKG  -ASA 81mg q24  -Metoprolol changed to Carvedilol 25mg BID per Cardio for PVCs  -Monitor Tele on BB due to prior bradycardia  -Lisinopril 5mg q24  -Atorvastatin 20mg qHS  -Net negative today -4L following diuresis with Lasix and Diuril in SICU  -Possible repeat BIJAL per Cardio  -Eventual ischemic w/u however no plan for LHC during this hospitalization  -Cardio reccs appreciated  -Interventional Cardio reccs apprecaited conservative management at this time    BL PTX and Pneumomediastinum  -Extubated to NC yesterday now on RA  -O2 via NC post extubation titrate as able as necessary  -s/p Chest Tube placement at [place] and removed PTA  -Avoid Positive Pressure if able  -CXR in AM    Septic Shock 2/2 Bladder Perforation  -s/p emergent ex-lap and anterior bladder repair on 5/17/25  -Off vasopressors  -Meropenem completed 5/20   -Finasteride 5mg q24  -Maintain Morris, SPT, and JOSHUA Drain do not remove unless instructed by Urology  -Monitor UOP  -Urology following appreciate reccs    DAYDAY  -Improving. Monitor BMP and UOP.    Agitation  -Seroquel 12.5mg qHS  -Repeat EKG in AM for prolonged qtc    DM2  -ISS + Admelog 5 units TID ACHS  -Lantus 20 units qHS    Constipation  -Bowel Regimen  -Miralax 17g q24 + Senna 2 tab qHS    Gout  -Allopurinol 100mg q24    Hypothyroidism  -Levothyroxine 50mcg q24    VTE PPX: SCD/LMWH 40mg q24  DISPO: DG from SICU to Telemetry. Transfer from Urology to Hospitalist Service.

## 2025-05-22 NOTE — PROGRESS NOTE ADULT - GASTROINTESTINAL COMMENTS
prevena dressing in place, on suction, malecot in place, capped, grecia
prevena removed, incision and staple line intact, healing well, SPT in place, capped
prevena dressing in place, on suction, JOSHUA in place, SPT

## 2025-05-22 NOTE — PROGRESS NOTE ADULT - SUBJECTIVE AND OBJECTIVE BOX
Subjective:74yMale POD#5 s/p ex-lap, repair of bladder perforation, SPT placement.  pt extubated yesterday, awake, feels tired.  Pt tolerated a diet last night.    Morris: yellow  grecia: serous    Vital Signs Last 24 Hrs  T(C): 37.4 (22 May 2025 10:00), Max: 37.8 (21 May 2025 20:00)  T(F): 99.3 (22 May 2025 10:00), Max: 100 (21 May 2025 20:00)  HR: 75 (22 May 2025 10:00) (59 - 100)  BP: 133/62 (22 May 2025 10:00) (129/62 - 177/100)  BP(mean): 83 (22 May 2025 10:00) (82 - 122)  RR: 26 (22 May 2025 10:00) (12 - 40)  SpO2: 100% (22 May 2025 10:00) (98% - 100%)    Parameters below as of 22 May 2025 08:00  Patient On (Oxygen Delivery Method): nasal cannula      I&O's Detail    21 May 2025 07:01  -  22 May 2025 07:00  --------------------------------------------------------  IN:    Glucerna 1.5: 80 mL  Total IN: 80 mL    OUT:    Bulb (mL): 290 mL    FentaNYL: 0 mL    Indwelling Catheter - Urethral (mL): 4225 mL  Total OUT: 4515 mL    Total NET: -4435 mL      22 May 2025 07:01  -  22 May 2025 10:17  --------------------------------------------------------  IN:  Total IN: 0 mL    OUT:    Bulb (mL): 40 mL    Indwelling Catheter - Urethral (mL): 120 mL  Total OUT: 160 mL    Total NET: -160 mL          Labs:                        8.9    6.89  )-----------( 158      ( 22 May 2025 03:00 )             28.2     05-22    144  |  108  |  17.6  ----------------------------<  151[H]  3.5   |  24.0  |  0.70    Ca    7.7[L]      22 May 2025 03:00  Phos  3.4     05-22  Mg     1.8     05-22    TPro  4.6[L]  /  Alb  2.5[L]  /  TBili  0.4  /  DBili  x   /  AST  11  /  ALT  17  /  AlkPhos  80  05-22         Pt seen at 8am with Dr. Bey    Subjective:74yMale POD#5 s/p ex-lap, repair of bladder perforation, SPT placement.  pt extubated yesterday, awake, feels tired.  Pt tolerated a diet last night.    Morris: yellow  grecia: serous    Vital Signs Last 24 Hrs  T(C): 37.4 (22 May 2025 10:00), Max: 37.8 (21 May 2025 20:00)  T(F): 99.3 (22 May 2025 10:00), Max: 100 (21 May 2025 20:00)  HR: 75 (22 May 2025 10:00) (59 - 100)  BP: 133/62 (22 May 2025 10:00) (129/62 - 177/100)  BP(mean): 83 (22 May 2025 10:00) (82 - 122)  RR: 26 (22 May 2025 10:00) (12 - 40)  SpO2: 100% (22 May 2025 10:00) (98% - 100%)    Parameters below as of 22 May 2025 08:00  Patient On (Oxygen Delivery Method): nasal cannula      I&O's Detail    21 May 2025 07:01  -  22 May 2025 07:00  --------------------------------------------------------  IN:    Glucerna 1.5: 80 mL  Total IN: 80 mL    OUT:    Bulb (mL): 290 mL    FentaNYL: 0 mL    Indwelling Catheter - Urethral (mL): 4225 mL  Total OUT: 4515 mL    Total NET: -4435 mL      22 May 2025 07:01  -  22 May 2025 10:17  --------------------------------------------------------  IN:  Total IN: 0 mL    OUT:    Bulb (mL): 40 mL    Indwelling Catheter - Urethral (mL): 120 mL  Total OUT: 160 mL    Total NET: -160 mL          Labs:                        8.9    6.89  )-----------( 158      ( 22 May 2025 03:00 )             28.2     05-22    144  |  108  |  17.6  ----------------------------<  151[H]  3.5   |  24.0  |  0.70    Ca    7.7[L]      22 May 2025 03:00  Phos  3.4     05-22  Mg     1.8     05-22    TPro  4.6[L]  /  Alb  2.5[L]  /  TBili  0.4  /  DBili  x   /  AST  11  /  ALT  17  /  AlkPhos  80  05-22

## 2025-05-22 NOTE — PROGRESS NOTE ADULT - PROBLEM SELECTOR PLAN 2
.  -  Troponin rising again this visit  - Per wife patient has known CAD and had a LHC in 2019 with Dr. Morales and has 2 blockages.  Per wife 100% blockage of dLCX and possible RCA disease, awaiting records from Dr. Tripp  - BIJAL 5/20/25:   EF 45-50% (Newly reduced from baseline 60%), apical anterior wall motion hypokinesis.    - EKG  Normal sinus rhythm with frequent PVCs  - Telemetry:  SR/SB. SB to 50bpm while sleeping  - patient with several co-morbid conditions, will need f/u of OP record to assess need for inpatient ischemic eval this visit  - consider repeat TTE in a few days to assess if EF still reduced  - Interventional cardiology consult to assess candidacy for LHC this admission .  -  Troponin rising again this visit, no s/s ACS, EKG NSR without ischemic changes  - Per wife patient has known CAD and had a LHC in 2019 with Dr. Morales and has 2 blockages.  Per wife 100% blockage of dLCX and possible RCA disease, awaiting records from Dr. Agudelo   - BIJAL 5/20/25:   EF 45-50% (Newly reduced from baseline 60%), apical anterior wall motion hypokinesis.    - EKG  Normal sinus rhythm with frequent PVCs  - Telemetry:  SR/SB. SB to 50bpm while sleeping  - patient with several co-morbid conditions, will need f/u of OP record to assess need for inpatient ischemic eval this visit  - consider repeat TTE in a few days to assess if EF still reduced  - Interventional cardiology consult to assess candidacy for LHC this admission .  -  Troponin rising again this visit, no s/s ACS, EKG NSR without ischemic changes  - LHC 5/13/2019: LM normal LAD normal, pCirc 70%, dCirc 100%, collatera ls from first marginal, mRCA 90%, rPLV 80%. Per OP notes, RCA lesion will need rotablation prior to multiple stents  - BIJAL 5/20/25:   EF 45-50% (Newly reduced from baseline 60%), apical anterior wall motion hypokinesis.    - EKG  Normal sinus rhythm with frequent PVCs,  SR/SB. SB to 50bpm while sleeping  - patient with several co-morbid conditions  - consider repeat TTE in a few days to assess if EF still reduced  - Interventional cardiology consult to assess candidacy for C this admission

## 2025-05-22 NOTE — CHART NOTE - NSCHARTNOTEFT_GEN_A_CORE
Event note: brief evaluation for cardiac cath candidacy.    HPI: 73yo Male, PMHx CVA, CAD, DM, HTN, who initially underwent for elective cystoscopy with CBI and TURP in [place] on 5/15, c/b bladder rupture and septic shock, transferred to [facility] on 5/16, now s/p ex lap with bladder repair and SPT placement on 5/16, post-op admitted to SICU for vent depended respiratory failure and septic shock, since resolved. Course also complicated by HFrEF and NSTEMI.        interventional cardiology asked to evaluate patient based on rising troponin and newly reduced ef to 45% found on BIJAL. Patient with history of known CAD - last cath in 2019 with 70% pLCx with distal occlusion, 90% mRCA lesion and 80% RPL (information optained from outpatient cardiologist note). His cardiologist at that time opted for medical management given calcifications and high risk with need for rotablation and multiple PCIs. As per the patients wife, patient has been unable to tolerate DAPT as an outpatient. He has had multiple episodes of gross hematuria which had led to his necessity for CBI and TURP.     pt seen/examined at bedside. patient with aphasia at baseline. unable to fully assess chest pain/symptoms given cognitive impairment. patient appeared to be in no acute distress. EKG with no ischemic changes. patient did appear to have brief episodes of possible a.fib on telemetry review.     T(F): 99.5 (05-22-25 @ 13:00)  HR: 96 (05-22-25 @ 13:00)  BP: 176/91 (05-22-25 @ 13:00)  BP(mean): 111 (05-22-25 @ 13:00)  ABP: --  RR: 26 (05-22-25 @ 13:00)  SpO2: 97% (05-22-25 @ 13:00)  CVP(mm Hg): --  CVP(cm H2O): --      PHYSICAL EXAM:   Neurological: alert to voice, aphasic at baseline.   ENT: mucus membrane moist  Cardiovascular: RRR, S1,S2  Respiratory: lungs clear anteriorly, respirations unlabored  Gastrointestinal: soft, NT, ND, BS+. midline abdominal incision with staples  Extremities: warm, +2 generalized edema  Vascular: no cyanosis/erythema. +2 dp pulses  Skin: no rashes    LABS:    05-22    144  |  108  |  17.6  ----------------------------<  151[H]  3.5   |  24.0  |  0.70    Ca    7.7[L]      22 May 2025 03:00  Phos  3.4     05-22  Mg     1.8     05-22    TPro  4.6[L]  /  Alb  2.5[L]  /  TBili  0.4  /  DBili  x   /  AST  11  /  ALT  17  /  AlkPhos  80  05-22  LIVER FUNCTIONS - ( 22 May 2025 03:00 )  Alb: 2.5 g/dL / Pro: 4.6 g/dL / ALK PHOS: 80 U/L / ALT: 17 U/L / AST: 11 U/L / GGT: x                               8.9    6.89  )-----------( 158      ( 22 May 2025 03:00 )             28.2     ABG - ( 21 May 2025 05:30 )  pH, Arterial: 7.440 pH, Blood: x     /  pCO2: 34    /  pO2: 136   / HCO3: 23    / Base Excess: -1.1  /  SaO2: 99.5      Urinalysis Basic - ( 22 May 2025 03:00 )    Color: x / Appearance: x / SG: x / pH: x  Gluc: 151 mg/dL / Ketone: x  / Bili: x / Urobili: x   Blood: x / Protein: x / Nitrite: x   Leuk Esterase: x / RBC: x / WBC x   Sq Epi: x / Non Sq Epi: x / Bacteria: x    CAPILLARY BLOOD GLUCOSE    POCT Blood Glucose.: 160 mg/dL (22 May 2025 12:05)  POCT Blood Glucose.: 139 mg/dL (22 May 2025 08:00)  POCT Blood Glucose.: 205 mg/dL (21 May 2025 22:05)  POCT Blood Glucose.: 186 mg/dL (21 May 2025 17:50)      A/P:  73yo Male, PMHx CVA, CAD, DM, HTN, who initially underwent for elective cystoscopy with CBI and TURP in [place] on 5/15, c/b bladder rupture and septic shock, transferred to [facility] on 5/16, now s/p ex lap with bladder repair and SPT placement on 5/16 c/b respiratory failure, septic shock and HFrEF, noted to have elevated troponin evaluated for candidacy for LHC.     Plan:  - case discussed with interventionalist Dr. Madrid, no plan for LHC at this time given chronicity/high risk CAD, lack of symptoms and inability to tolerate DAPT  - troponin peak now downtrending, likely demand ischemia i/s/o surgery/sepsis/blood loss, although unable to fully r/o progressive CAD  - patient without overt signs of chest pain, EKGs without signs of ischemia   - opt for medical management at this time, to be monitored by clinical cardiology  - yadifib noted on tele discussed with cardiology  - spoke with patient and wife at bedside, wife is in agreement with conservative management at this time  - if acute changes on EKG, please reconsult intervention cardiology Event note: brief evaluation for cardiac cath candidacy.    HPI: 73yo Male, PMHx CVA, CAD, DM, HTN, who initially underwent for elective cystoscopy with CBI and TURP in [place] on 5/15, c/b bladder rupture and septic shock, transferred to [facility] on 5/16, now s/p ex lap with bladder repair and SPT placement on 5/16, post-op admitted to SICU for vent depended respiratory failure and septic shock, since resolved. Course also complicated by HFrEF and NSTEMI.        interventional cardiology asked to evaluate patient based on rising troponin and newly reduced ef to 45% found on BIJAL. Patient with history of known CAD - last cath in 2019 with 70% pLCx with distal occlusion, 90% mRCA lesion and 80% RPL (information optained from outpatient cardiologist note). His cardiologist at that time opted for medical management given calcifications and high risk with need for rotablation and multiple PCIs. As per the patients wife, patient has been unable to tolerate DAPT as an outpatient. He has had multiple episodes of gross hematuria which had led to his necessity for CBI and TURP.     pt seen/examined at bedside. patient with aphasia at baseline. unable to fully assess chest pain/symptoms given cognitive impairment. patient appeared to be in no acute distress. EKG with no ischemic changes. patient did appear to have brief episodes of possible a.fib on telemetry review.     T(F): 99.5 (05-22-25 @ 13:00)  HR: 96 (05-22-25 @ 13:00)  BP: 176/91 (05-22-25 @ 13:00)  BP(mean): 111 (05-22-25 @ 13:00)  ABP: --  RR: 26 (05-22-25 @ 13:00)  SpO2: 97% (05-22-25 @ 13:00)  CVP(mm Hg): --  CVP(cm H2O): --      PHYSICAL EXAM:   Neurological: alert to voice, aphasic at baseline.   ENT: mucus membrane moist  Cardiovascular: RRR, S1,S2  Respiratory: lungs clear anteriorly, respirations unlabored  Gastrointestinal: soft, NT, ND, BS+. midline abdominal incision with staples  Extremities: warm, +2 generalized edema  Vascular: no cyanosis/erythema. +2 dp pulses  Skin: no rashes    LABS:    05-22    144  |  108  |  17.6  ----------------------------<  151[H]  3.5   |  24.0  |  0.70    Ca    7.7[L]      22 May 2025 03:00  Phos  3.4     05-22  Mg     1.8     05-22    TPro  4.6[L]  /  Alb  2.5[L]  /  TBili  0.4  /  DBili  x   /  AST  11  /  ALT  17  /  AlkPhos  80  05-22  LIVER FUNCTIONS - ( 22 May 2025 03:00 )  Alb: 2.5 g/dL / Pro: 4.6 g/dL / ALK PHOS: 80 U/L / ALT: 17 U/L / AST: 11 U/L / GGT: x                               8.9    6.89  )-----------( 158      ( 22 May 2025 03:00 )             28.2     ABG - ( 21 May 2025 05:30 )  pH, Arterial: 7.440 pH, Blood: x     /  pCO2: 34    /  pO2: 136   / HCO3: 23    / Base Excess: -1.1  /  SaO2: 99.5      Urinalysis Basic - ( 22 May 2025 03:00 )    Color: x / Appearance: x / SG: x / pH: x  Gluc: 151 mg/dL / Ketone: x  / Bili: x / Urobili: x   Blood: x / Protein: x / Nitrite: x   Leuk Esterase: x / RBC: x / WBC x   Sq Epi: x / Non Sq Epi: x / Bacteria: x    CAPILLARY BLOOD GLUCOSE    POCT Blood Glucose.: 160 mg/dL (22 May 2025 12:05)  POCT Blood Glucose.: 139 mg/dL (22 May 2025 08:00)  POCT Blood Glucose.: 205 mg/dL (21 May 2025 22:05)  POCT Blood Glucose.: 186 mg/dL (21 May 2025 17:50)      A/P:  73yo Male, PMHx CVA, CAD, DM, HTN, who initially underwent for elective cystoscopy with CBI and TURP in [place] on 5/15, c/b bladder rupture and septic shock, transferred to [facility] on 5/16, now s/p ex lap with bladder repair and SPT placement on 5/16 c/b respiratory failure, septic shock and HFrEF, noted to have elevated troponin evaluated for candidacy for LHC.     Plan:  - case discussed with interventionalist Dr. Madrid, no plan for LHC at this time given chronicity/high risk CAD, lack of symptoms and inability to tolerate DAPT  - troponin peak now downtrending, likely demand ischemia i/s/o surgery/sepsis/blood loss, although unable to fully r/o progressive CAD  - patient without overt signs of chest pain, EKGs without signs of ischemia   - opt for medical management at this time, to be monitored by clinical cardiology  - yadifib noted on tele discussed with cardiology  - spoke with patient and wife at bedside, wife is in agreement with conservative management at this time  - if acute changes on EKG, please reconsult interventional cardiology

## 2025-05-23 LAB
ALBUMIN SERPL ELPH-MCNC: 2.3 G/DL — LOW (ref 3.3–5.2)
ALP SERPL-CCNC: 77 U/L — SIGNIFICANT CHANGE UP (ref 40–120)
ALT FLD-CCNC: 13 U/L — SIGNIFICANT CHANGE UP
ANION GAP SERPL CALC-SCNC: 11 MMOL/L — SIGNIFICANT CHANGE UP (ref 5–17)
AST SERPL-CCNC: 12 U/L — SIGNIFICANT CHANGE UP
BASOPHILS # BLD AUTO: 0.01 K/UL — SIGNIFICANT CHANGE UP (ref 0–0.2)
BASOPHILS NFR BLD AUTO: 0.1 % — SIGNIFICANT CHANGE UP (ref 0–2)
BILIRUB SERPL-MCNC: 0.5 MG/DL — SIGNIFICANT CHANGE UP (ref 0.4–2)
BUN SERPL-MCNC: 15.9 MG/DL — SIGNIFICANT CHANGE UP (ref 8–20)
CALCIUM SERPL-MCNC: 7.5 MG/DL — LOW (ref 8.4–10.5)
CHLORIDE SERPL-SCNC: 109 MMOL/L — HIGH (ref 96–108)
CO2 SERPL-SCNC: 23 MMOL/L — SIGNIFICANT CHANGE UP (ref 22–29)
CREAT SERPL-MCNC: 0.69 MG/DL — SIGNIFICANT CHANGE UP (ref 0.5–1.3)
EGFR: 97 ML/MIN/1.73M2 — SIGNIFICANT CHANGE UP
EGFR: 97 ML/MIN/1.73M2 — SIGNIFICANT CHANGE UP
EOSINOPHIL # BLD AUTO: 0.25 K/UL — SIGNIFICANT CHANGE UP (ref 0–0.5)
EOSINOPHIL NFR BLD AUTO: 2.8 % — SIGNIFICANT CHANGE UP (ref 0–6)
GLUCOSE BLDC GLUCOMTR-MCNC: 140 MG/DL — HIGH (ref 70–99)
GLUCOSE BLDC GLUCOMTR-MCNC: 212 MG/DL — HIGH (ref 70–99)
GLUCOSE BLDC GLUCOMTR-MCNC: 216 MG/DL — HIGH (ref 70–99)
GLUCOSE BLDC GLUCOMTR-MCNC: 260 MG/DL — HIGH (ref 70–99)
GLUCOSE SERPL-MCNC: 131 MG/DL — HIGH (ref 70–99)
HCT VFR BLD CALC: 29.6 % — LOW (ref 39–50)
HGB BLD-MCNC: 8.9 G/DL — LOW (ref 13–17)
IMM GRANULOCYTES # BLD AUTO: 0.06 K/UL — SIGNIFICANT CHANGE UP (ref 0–0.07)
IMM GRANULOCYTES NFR BLD AUTO: 0.7 % — SIGNIFICANT CHANGE UP (ref 0–0.9)
LYMPHOCYTES # BLD AUTO: 1.03 K/UL — SIGNIFICANT CHANGE UP (ref 1–3.3)
LYMPHOCYTES NFR BLD AUTO: 11.4 % — LOW (ref 13–44)
MAGNESIUM SERPL-MCNC: 2 MG/DL — SIGNIFICANT CHANGE UP (ref 1.6–2.6)
MCHC RBC-ENTMCNC: 28.3 PG — SIGNIFICANT CHANGE UP (ref 27–34)
MCHC RBC-ENTMCNC: 30.1 G/DL — LOW (ref 32–36)
MCV RBC AUTO: 94.3 FL — SIGNIFICANT CHANGE UP (ref 80–100)
MONOCYTES # BLD AUTO: 0.51 K/UL — SIGNIFICANT CHANGE UP (ref 0–0.9)
MONOCYTES NFR BLD AUTO: 5.7 % — SIGNIFICANT CHANGE UP (ref 2–14)
NEUTROPHILS # BLD AUTO: 7.14 K/UL — SIGNIFICANT CHANGE UP (ref 1.8–7.4)
NEUTROPHILS NFR BLD AUTO: 79.3 % — HIGH (ref 43–77)
NRBC # BLD AUTO: 0 K/UL — SIGNIFICANT CHANGE UP (ref 0–0)
NRBC # FLD: 0 K/UL — SIGNIFICANT CHANGE UP (ref 0–0)
NRBC BLD AUTO-RTO: 0 /100 WBCS — SIGNIFICANT CHANGE UP (ref 0–0)
PHOSPHATE SERPL-MCNC: 2.7 MG/DL — SIGNIFICANT CHANGE UP (ref 2.4–4.7)
PLATELET # BLD AUTO: 154 K/UL — SIGNIFICANT CHANGE UP (ref 150–400)
PMV BLD: 11.9 FL — SIGNIFICANT CHANGE UP (ref 7–13)
POTASSIUM SERPL-MCNC: 4 MMOL/L — SIGNIFICANT CHANGE UP (ref 3.5–5.3)
POTASSIUM SERPL-SCNC: 4 MMOL/L — SIGNIFICANT CHANGE UP (ref 3.5–5.3)
PROT SERPL-MCNC: 4.5 G/DL — LOW (ref 6.6–8.7)
RBC # BLD: 3.14 M/UL — LOW (ref 4.2–5.8)
RBC # FLD: 17.4 % — HIGH (ref 10.3–14.5)
SODIUM SERPL-SCNC: 143 MMOL/L — SIGNIFICANT CHANGE UP (ref 135–145)
WBC # BLD: 9 K/UL — SIGNIFICANT CHANGE UP (ref 3.8–10.5)
WBC # FLD AUTO: 9 K/UL — SIGNIFICANT CHANGE UP (ref 3.8–10.5)

## 2025-05-23 PROCEDURE — 99233 SBSQ HOSP IP/OBS HIGH 50: CPT | Mod: GC

## 2025-05-23 PROCEDURE — 99232 SBSQ HOSP IP/OBS MODERATE 35: CPT

## 2025-05-23 PROCEDURE — 71045 X-RAY EXAM CHEST 1 VIEW: CPT | Mod: 26

## 2025-05-23 PROCEDURE — 93010 ELECTROCARDIOGRAM REPORT: CPT

## 2025-05-23 RX ORDER — FUROSEMIDE 10 MG/ML
20 INJECTION INTRAMUSCULAR; INTRAVENOUS ONCE
Refills: 0 | Status: COMPLETED | OUTPATIENT
Start: 2025-05-23 | End: 2025-05-23

## 2025-05-23 RX ORDER — FUROSEMIDE 10 MG/ML
40 INJECTION INTRAMUSCULAR; INTRAVENOUS DAILY
Refills: 0 | Status: DISCONTINUED | OUTPATIENT
Start: 2025-05-23 | End: 2025-05-27

## 2025-05-23 RX ADMIN — CARVEDILOL 25 MILLIGRAM(S): 3.12 TABLET, FILM COATED ORAL at 05:16

## 2025-05-23 RX ADMIN — INSULIN LISPRO 3: 100 INJECTION, SOLUTION INTRAVENOUS; SUBCUTANEOUS at 12:39

## 2025-05-23 RX ADMIN — INSULIN LISPRO 5 UNIT(S): 100 INJECTION, SOLUTION INTRAVENOUS; SUBCUTANEOUS at 08:04

## 2025-05-23 RX ADMIN — ATORVASTATIN CALCIUM 20 MILLIGRAM(S): 80 TABLET, FILM COATED ORAL at 21:32

## 2025-05-23 RX ADMIN — Medication 2 TABLET(S): at 21:32

## 2025-05-23 RX ADMIN — Medication 3 MILLIGRAM(S): at 21:33

## 2025-05-23 RX ADMIN — POLYETHYLENE GLYCOL 3350 17 GRAM(S): 17 POWDER, FOR SOLUTION ORAL at 12:38

## 2025-05-23 RX ADMIN — Medication 81 MILLIGRAM(S): at 12:38

## 2025-05-23 RX ADMIN — QUETIAPINE FUMARATE 12.5 MILLIGRAM(S): 25 TABLET ORAL at 21:33

## 2025-05-23 RX ADMIN — FUROSEMIDE 20 MILLIGRAM(S): 10 INJECTION INTRAMUSCULAR; INTRAVENOUS at 12:38

## 2025-05-23 RX ADMIN — INSULIN LISPRO 2: 100 INJECTION, SOLUTION INTRAVENOUS; SUBCUTANEOUS at 21:31

## 2025-05-23 RX ADMIN — CARVEDILOL 25 MILLIGRAM(S): 3.12 TABLET, FILM COATED ORAL at 17:24

## 2025-05-23 RX ADMIN — ENOXAPARIN SODIUM 40 MILLIGRAM(S): 100 INJECTION SUBCUTANEOUS at 05:17

## 2025-05-23 RX ADMIN — Medication 100 MILLIGRAM(S): at 12:38

## 2025-05-23 RX ADMIN — LISINOPRIL 5 MILLIGRAM(S): 5 TABLET ORAL at 05:17

## 2025-05-23 RX ADMIN — Medication 50 MICROGRAM(S): at 05:17

## 2025-05-23 RX ADMIN — INSULIN LISPRO 2: 100 INJECTION, SOLUTION INTRAVENOUS; SUBCUTANEOUS at 17:25

## 2025-05-23 RX ADMIN — INSULIN LISPRO 5 UNIT(S): 100 INJECTION, SOLUTION INTRAVENOUS; SUBCUTANEOUS at 12:39

## 2025-05-23 RX ADMIN — INSULIN LISPRO 5 UNIT(S): 100 INJECTION, SOLUTION INTRAVENOUS; SUBCUTANEOUS at 17:24

## 2025-05-23 RX ADMIN — Medication 1 APPLICATION(S): at 12:38

## 2025-05-23 RX ADMIN — FINASTERIDE 5 MILLIGRAM(S): 1 TABLET, FILM COATED ORAL at 12:38

## 2025-05-23 RX ADMIN — INSULIN GLARGINE-YFGN 20 UNIT(S): 100 INJECTION, SOLUTION SUBCUTANEOUS at 21:30

## 2025-05-23 NOTE — PROGRESS NOTE ADULT - SUBJECTIVE AND OBJECTIVE BOX
Subjective: 74y Male seen at bedside. Pt resting comfortably. No events overnight. No acute urological complaints.     STATUS POST:  Ex-lap, bladder perforation repair, and SPT placement    POST OPERATIVE DAY #: 6    MEDICATIONS  (STANDING):  allopurinol 100 milliGRAM(s) Oral daily  aspirin  chewable 81 milliGRAM(s) Oral daily  atorvastatin 20 milliGRAM(s) Oral at bedtime  carvedilol 25 milliGRAM(s) Oral every 12 hours  chlorhexidine 2% Cloths 1 Application(s) Topical daily  enoxaparin Injectable 40 milliGRAM(s) SubCutaneous every 24 hours  finasteride 5 milliGRAM(s) Oral daily  insulin glargine Injectable (LANTUS) 20 Unit(s) SubCutaneous at bedtime  insulin lispro (ADMELOG) corrective regimen sliding scale   SubCutaneous Before meals and at bedtime  insulin lispro Injectable (ADMELOG) 5 Unit(s) SubCutaneous three times a day before meals  levothyroxine 50 MICROGram(s) Oral daily  lisinopril 5 milliGRAM(s) Oral daily  melatonin 3 milliGRAM(s) Oral at bedtime  polyethylene glycol 3350 17 Gram(s) Oral every 24 hours  QUEtiapine 12.5 milliGRAM(s) Oral at bedtime  senna 2 Tablet(s) Oral at bedtime    MEDICATIONS  (PRN):  acetaminophen     Tablet .. 975 milliGRAM(s) Oral every 6 hours PRN Mild Pain (1 - 3), Moderate Pain (4 - 6), Severe Pain (7 - 10)      Vital Signs Last 24 Hrs  T(C): 36.3 (23 May 2025 04:45), Max: 37.7 (22 May 2025 14:00)  T(F): 97.4 (23 May 2025 04:45), Max: 99.9 (22 May 2025 14:00)  HR: 74 (23 May 2025 04:45) (60 - 103)  BP: 158/88 (23 May 2025 04:45) (133/62 - 176/91)  BP(mean): 111 (22 May 2025 17:00) (83 - 112)  RR: 18 (23 May 2025 04:45) (18 - 31)  SpO2: 100% (23 May 2025 04:45) (94% - 100%)    Parameters below as of 23 May 2025 04:45  Patient On (Oxygen Delivery Method): BiPAP/CPAP          05-22 - 05-23  --------------------------------------------------------  IN:  Total IN: 0 mL    OUT:    Bulb (mL): 40 mL    Indwelling Catheter - Urethral (mL): 1470 mL  Total OUT: 1510 mL    Total NET: -1510 mL          PHYSICAL EXAM   General: NAD  Respiratory: Respirations non-labored; no accessory muscle use  ABD: Soft, non-tender, non-distended. Midline incision  intact. SPT capped and in place. Alex drain SS, output decreasing   : Stout catheter in place draining clear, yellow urine  Skin: Warm, dry. No cyanosis     LABS:                        8.9    9.00  )-----------( 154      ( 23 May 2025 05:04 )             29.6     05-23    143  |  109[H]  |  15.9  ----------------------------<  131[H]  4.0   |  23.0  |  0.69    Ca    7.5[L]      23 May 2025 05:04  Phos  2.7     05-23  Mg     2.0     05-23    TPro  4.5[L]  /  Alb  2.3[L]  /  TBili  0.5  /  DBili  x   /  AST  12  /  ALT  13  /  AlkPhos  77  05-23      Urinalysis Basic - ( 23 May 2025 05:04 )    Color: x / Appearance: x / SG: x / pH: x  Gluc: 131 mg/dL / Ketone: x  / Bili: x / Urobili: x   Blood: x / Protein: x / Nitrite: x   Leuk Esterase: x / RBC: x / WBC x   Sq Epi: x / Non Sq Epi: x / Bacteria: x          ASSESSMENT:  74y Male tx from [facility] with bladder perforation, b/l pneumothorax, pneumomediastinum, NSTEMI, fluid overload s/p ex lap, anterior bladder wall repair, and SPT insertion POD #6     PLAN:  Maintain stout catheter, DO NOT REMOVE  Monitor JOSHUA drain output   OOB to chair   DVT ppx: lovenox   Continue care as per primary team   Urology will continue to follow

## 2025-05-23 NOTE — PROGRESS NOTE ADULT - ASSESSMENT
74M with PMHX CVA c/b residual aphasia/R hemiparesis on Plavix, CAD s/p PCI, HTN, HLD, DM2 admitted to [place] for hematuria and underwent cystoscopy with clot evacuation c/b septic shock found to have bladder perforation, BL Pneumothorax, and Pneumomediastinum transferred to [facility] SICU for emergent ex-lap and anterior baldder repair 5/17/25 complicated Septic Shock, NSTEMI, and Acute on Chronc HF Exacerbation.        Plan  #Acute on Chronic HFrEF Exac c/b NSTEMI  -TTE +TDS 2/2 PTX and Pneumomediastinum  -BIJAL +LVEF 45-50 with apical anterior segment RWMA  -Prior CXR increasing L effusion  -EKG NSR 71bpm low voltage +prolonged qtc 530ms abnormal EKG  -ASA 81mg q24  -Metoprolol changed to Carvedilol 25mg BID per Cardio for PVCs  -Monitor Tele on BB due to prior bradycardia  -Lisinopril 5mg q24  -Atorvastatin 20mg qHS  - Outpatient ischemic w/u, no plan for LHC during this hospitalization  -Conservative management at this time    #BL PTX and Pneumomediastinum  -s/p intubation and extubation on RA  -O2 via NC post extubation titrate as able as necessary  -s/p Chest Tube placement at [place] and removed PTA  -Avoid Positive Pressure if able  -pending rpt CXR this AM    #Septic Shock 2/2 Bladder Perforation -- resolved  -s/p emergent ex-lap and anterior bladder repair on 5/17/25  -Off vasopressors  -Meropenem completed 5/20   -Finasteride 5mg q24  -Maintain Morris, SPT, and JOSHUA Drain do not remove unless instructed by Urology  -Monitor UOP  -Urology following     #DAYDAY  -Improving. Monitor BMP and UOP.    #Agitation  -Seroquel 12.5mg qHS  -Repeat EKG for prolonged qtc    #DM2  -ISS + Admelog 5 units TID ACHS  -Lantus 20 units qHS    #Constipation  -Bowel Regimen  -Miralax 17g q24 + Senna 2 tab qHS    #Gout  -Allopurinol 100mg q24    #Hypothyroidism  -Levothyroxine 50mcg q24    VTE PPX: SCD/LMWH 40mg q24   74M with PMHX CVA c/b residual aphasia/R hemiparesis on Plavix, CAD s/p PCI, HTN, HLD, DM2 admitted to [place] for hematuria and underwent cystoscopy with clot evacuation c/b septic shock found to have bladder perforation, BL Pneumothorax, and Pneumomediastinum transferred to [facility] SICU for emergent ex-lap and anterior baldder repair 5/17/25 complicated Septic Shock, NSTEMI, and Acute on Chronc HF Exacerbation.        Plan  #Acute on Chronic HFrEF Exac c/b NSTEMI  -TTE +TDS 2/2 PTX and Pneumomediastinum  -BIJAL +LVEF 45-50 with apical anterior segment RWMA  -Prior CXR increasing L effusion  -EKG NSR 71bpm low voltage +prolonged qtc 530ms abnormal EKG  -ASA 81mg q24  -Metoprolol changed to Carvedilol 25mg BID per Cardio for PVCs  -Monitor Tele on BB due to prior bradycardia  -Lisinopril 5mg q24  -Atorvastatin 20mg qHS  - Outpatient ischemic w/u, no plan for LHC during this hospitalization  -Conservative management at this time    #BL PTX and Pneumomediastinum  -s/p intubation and extubation on RA  -O2 via NC post extubation titrate as able as necessary  -s/p Chest Tube placement at [place] and removed PTA  -Avoid Positive Pressure if able  -pending rpt CXR this AM    #Septic Shock 2/2 Bladder Perforation -- resolved  -s/p emergent ex-lap and anterior bladder repair on 5/17/25  -Off vasopressors  -Meropenem completed 5/20   -Finasteride 5mg q24  -Maintain Morris, SPT, and JOSHUA Drain do not remove unless instructed by Urology  -Monitor UOP  -Urology following     #DAYDAY  -Improving. Monitor BMP and UOP.    #Agitation  -Seroquel 12.5mg qHS  -Repeat EKG for prolonged qtc    #DM2  -ISS + Admelog 5 units TID ACHS  -Lantus 20 units qHS    #Constipation  -Bowel Regimen  -Miralax 17g q24 + Senna 2 tab qHS    #Gout  -Allopurinol 100mg q24    #Hypothyroidism  -Levothyroxine 50mcg q24    VTE PPX: SCD/LMWH 40mg q24    Dispo: clinically active   74M with PMHX CVA c/b residual aphasia/R hemiparesis on Plavix, CAD s/p PCI, HTN, HLD, DM2 admitted to [place] for hematuria and underwent cystoscopy with clot evacuation c/b septic shock found to have bladder perforation, BL Pneumothorax, and Pneumomediastinum transferred to [facility] SICU for emergent ex-lap and anterior baldder repair 5/17/25 complicated Septic Shock, NSTEMI, and Acute on Chronc HF Exacerbation.        Plan  #Acute on Chronic HFrEF Exac c/b NSTEMI  -TTE +TDS 2/2 PTX and Pneumomediastinum  -BIJAL +LVEF 45-50 with apical anterior segment RWMA  -Prior CXR increasing L effusion  -EKG NSR 71bpm low voltage +prolonged qtc 530ms abnormal EKG  -ASA 81mg q24  -Metoprolol changed to Carvedilol 25mg BID per Cardio for PVCs  -Monitor Tele on BB due to prior bradycardia  -Lisinopril 5mg q24  -Atorvastatin 20mg qHS  - Outpatient ischemic w/u, no plan for LHC during this hospitalization  -Conservative management at this time    #Mild UE edema  - likely positional  - one dose of 20mg Lasix IV given  - positional changes as needed    #BL PTX and Pneumomediastinum  -s/p intubation and extubation on RA  -O2 via NC post extubation titrate as able as necessary  -s/p Chest Tube placement at [place] and removed PTA  -Avoid Positive Pressure if able  -pending rpt CXR this AM    #Septic Shock 2/2 Bladder Perforation -- resolved  -s/p emergent ex-lap and anterior bladder repair on 5/17/25  -Off vasopressors  -Meropenem completed 5/20   -Finasteride 5mg q24  -Maintain Morris, SPT, and JOSHUA Drain do not remove unless instructed by Urology  -Monitor UOP  -Urology following     #DAYDAY  -Improving. Monitor BMP and UOP.    #Agitation  -Seroquel 12.5mg qHS  -Repeat EKG for prolonged qtc    #DM2  -ISS + Admelog 5 units TID ACHS  -Lantus 20 units qHS    #Constipation  -Bowel Regimen  -Miralax 17g q24 + Senna 2 tab qHS    #Gout  -Allopurinol 100mg q24    #Hypothyroidism  -Levothyroxine 50mcg q24    VTE PPX: SCD/LMWH 40mg q24    Dispo: clinically active, pending removal of JOSHUA drain

## 2025-05-23 NOTE — PROGRESS NOTE ADULT - REASON FOR ADMISSION
septic shock s/p bladder perf, NSTEMI, HFrEF exacerbation
Septic Shock/PTX/Pneumomediastinum/Bladder Perforation

## 2025-05-23 NOTE — PROGRESS NOTE ADULT - NSPROGADDITIONALINFOA_GEN_ALL_CORE
Thank you for allowing me to participate in care of your patient.   Please call as needed, cardiology to follow peripherally

## 2025-05-23 NOTE — PROGRESS NOTE ADULT - SUBJECTIVE AND OBJECTIVE BOX
[facility]                                                         CARDIOLOGY AT [facility]                                                                  [address] [address]                                                         Telephone: [phone]. Fax:[phone]                                                                             PROGRESS NOTE    Reason for follow up: HFrEF, elevated troponin   Overall Plan: REcords obtained. o/p ischemic work up      Review of symptoms:   Cardiac:  No chest pain. No dyspnea. No palpitations.  Respiratory: no cough. No dyspnea  Gastrointestinal: No diarrhea. No abdominal pain. No bleeding.   Neuro: No focal neuro complaints.      Vitals:  T(C): 36.4 (05-23-25 @ 08:55), Max: 37.7 (05-22-25 @ 14:00)  HR: 72 (05-23-25 @ 08:55) (60 - 103)  BP: 147/79 (05-23-25 @ 08:55) (136/77 - 176/91)  RR: 18 (05-23-25 @ 08:55) (18 - 31)  SpO2: 96% (05-23-25 @ 08:55) (94% - 100%)      I&O's Summary    22 May 2025 07:01  -  23 May 2025 07:00  --------------------------------------------------------  IN: 0 mL / OUT: 1510 mL / NET: -1510 mL        PHYSICAL EXAM:  Appearance: Comfortable. No acute distress  HEENT:  Atraumatic. Normocephalic.  Normal oral mucosa  Neurologic: A & O x 3, R sided hemiparesis   Cardiovascular: RRR S1 S2, No murmur, no rubs/gallops. No JVD  Respiratory: Lungs clear to auscultation, unlabored   Gastrointestinal:  Soft, Non-tender, + BS  Extremities: +edema  Psychiatry: Patient is calm. No agitation.   Skin: warm and dry.      CURRENT CARDIAC MEDICATIONS:  carvedilol 25 milliGRAM(s) Oral every 12 hours  lisinopril 5 milliGRAM(s) Oral daily      CURRENT OTHER MEDICATIONS:  acetaminophen     Tablet .. 975 milliGRAM(s) Oral every 6 hours PRN Mild Pain (1 - 3), Moderate Pain (4 - 6), Severe Pain (7 - 10)  melatonin 3 milliGRAM(s) Oral at bedtime  QUEtiapine 12.5 milliGRAM(s) Oral at bedtime  polyethylene glycol 3350 17 Gram(s) Oral every 24 hours  senna 2 Tablet(s) Oral at bedtime  allopurinol 100 milliGRAM(s) Oral daily  atorvastatin 20 milliGRAM(s) Oral at bedtime  finasteride 5 milliGRAM(s) Oral daily  insulin glargine Injectable (LANTUS) 20 Unit(s) SubCutaneous at bedtime  insulin lispro (ADMELOG) corrective regimen sliding scale   SubCutaneous Before meals and at bedtime  insulin lispro Injectable (ADMELOG) 5 Unit(s) SubCutaneous three times a day before meals  levothyroxine 50 MICROGram(s) Oral daily  aspirin  chewable 81 milliGRAM(s) Oral daily  chlorhexidine 2% Cloths 1 Application(s) Topical daily  enoxaparin Injectable 40 milliGRAM(s) SubCutaneous every 24 hours        LABS:	 	                        8.9    9.00  )-----------( 154      ( 23 May 2025 05:04 )             29.6     05-23    143  |  109[H]  |  15.9  ----------------------------<  131[H]  4.0   |  23.0  |  0.69    Ca    7.5[L]      23 May 2025 05:04  Phos  2.7     05-23  Mg     2.0     05-23    TPro  4.5[L]  /  Alb  2.3[L]  /  TBili  0.5  /  DBili  x   /  AST  12  /  ALT  13  /  AlkPhos  77  05-23    PT/INR/PTT ( 17 May 2025 05:00 )                       :                       :      16.5         :       24.6                  .        .                   .              .           .       1.43        .                                       TSH: Thyroid Stimulating Hormone, Serum: 4.97 uIU/mL    TELEMETRY: SR      DIAGNOSTIC TESTING:  [ ] Echocardiogram:   < from: BIJAL W or WO Ultrasound Enhancing Agent (05.20.25 @ 08:11) >     CONCLUSIONS:      1. Left ventricular systolic function is mildlydecreased with an ejection fraction visually estimated at 45 to 50 %. Regional wall motion abnormalities present.   2. Apical anterior segment is abnormal.   3. No evidence of left atrial or left atrial appendage thrombus.    < end of copied text >

## 2025-05-23 NOTE — PROGRESS NOTE ADULT - SUBJECTIVE AND OBJECTIVE BOX
BRIEF HOSPITAL COURSE SUMMARY:   74M with PMHX CVA c/b residual aphasia/R hemiparesis on Plavix, CAD s/p PCI, HTN, HLD, DM2 admitted to [place] for hematuria and underwent cystoscopy with clot evacuation c/b septic shock found to have bladder perforation, BL Pneumothorax, and Pneumomediastinum. R Chest tube placed at [place] and removed prior to transfer to [facility] for Urological evaluation. Admitted to SICU now s/p ex-lap and anterior bladder repair on 5/17/25 with ACS and Urology. SICU course complicated by elevated troponin. TTE TDS due to overlying air. BIJAL with LVEF 45-50% and +RWMA. Cardio and Interventional Cardio reccs appreciated. Eventual ischemic workup but no plan for LHC at this time/hospitalization. GDMT restarted. Diuresed with Lasix/Diuril today net negative 4L. Extuabted yesterdayt to RA. Completed Meropenem and off vasopressors. Urology managing JOSHUA/SPC/Morris.       LAST 24 HOURS:  - no acute overnight events reported    TODAY:  Pt seen and examined at the bedside this morning. Offers no acute complaints. Denies fever, chills, N/V/D, abdominal pain, HA, CP, SOB, or dizziness.        MEDICATIONS  (STANDING):  allopurinol 100 milliGRAM(s) Oral daily  aspirin  chewable 81 milliGRAM(s) Oral daily  atorvastatin 20 milliGRAM(s) Oral at bedtime  carvedilol 25 milliGRAM(s) Oral every 12 hours  chlorhexidine 2% Cloths 1 Application(s) Topical daily  enoxaparin Injectable 40 milliGRAM(s) SubCutaneous every 24 hours  finasteride 5 milliGRAM(s) Oral daily  insulin glargine Injectable (LANTUS) 20 Unit(s) SubCutaneous at bedtime  insulin lispro (ADMELOG) corrective regimen sliding scale   SubCutaneous Before meals and at bedtime  insulin lispro Injectable (ADMELOG) 5 Unit(s) SubCutaneous three times a day before meals  levothyroxine 50 MICROGram(s) Oral daily  lisinopril 5 milliGRAM(s) Oral daily  melatonin 3 milliGRAM(s) Oral at bedtime  polyethylene glycol 3350 17 Gram(s) Oral every 24 hours  QUEtiapine 12.5 milliGRAM(s) Oral at bedtime  senna 2 Tablet(s) Oral at bedtime    MEDICATIONS  (PRN):  acetaminophen     Tablet .. 975 milliGRAM(s) Oral every 6 hours PRN Mild Pain (1 - 3), Moderate Pain (4 - 6), Severe Pain (7 - 10)    Allergies    No Known Allergies    Intolerances      REVIEW OF SYSTEMS:  as above      Vital Signs Last 24 Hrs  T(C): 36.4 (23 May 2025 08:55), Max: 37.7 (22 May 2025 14:00)  T(F): 97.5 (23 May 2025 08:55), Max: 99.9 (22 May 2025 14:00)  HR: 72 (23 May 2025 08:55) (60 - 103)  BP: 147/79 (23 May 2025 08:55) (133/62 - 176/91)  BP(mean): 111 (22 May 2025 17:00) (83 - 112)  RR: 18 (23 May 2025 08:55) (18 - 31)  SpO2: 96% (23 May 2025 08:55) (94% - 100%)    Parameters below as of 23 May 2025 04:45  Patient On (Oxygen Delivery Method): BiPAP/CPAP        PHYSICAL EXAM:  General: NAD, well appearing  HEENT: NCAT. PERRLA. Neck supple, no JVD  Pulm: CTAB  Cardiac: RRR  Abdomen: Soft, Nontender and nondistended  : +Morris Catheter +SPC +JOSHUA Drain  Skin: Warm, dry and intact without rashes or lesions.  Neuro: AAOx3, no gross focal deficits  MSK: ALMARAZ spontaneously  Extremities: No clubbing, cyanosis, or edema in B/L UE or LE      LABS:                        8.9    9.00  )-----------( 154      ( 23 May 2025 05:04 )             29.6     05-23    143  |  109[H]  |  15.9  ----------------------------<  131[H]  4.0   |  23.0  |  0.69    Ca    7.5[L]      23 May 2025 05:04  Phos  2.7     05-23  Mg     2.0     05-23    TPro  4.5[L]  /  Alb  2.3[L]  /  TBili  0.5  /  DBili  x   /  AST  12  /  ALT  13  /  AlkPhos  77  05-23      Urinalysis Basic - ( 23 May 2025 05:04 )    Color: x / Appearance: x / SG: x / pH: x  Gluc: 131 mg/dL / Ketone: x  / Bili: x / Urobili: x   Blood: x / Protein: x / Nitrite: x   Leuk Esterase: x / RBC: x / WBC x   Sq Epi: x / Non Sq Epi: x / Bacteria: x      CAPILLARY BLOOD GLUCOSE      POCT Blood Glucose.: 140 mg/dL (23 May 2025 08:03)  POCT Blood Glucose.: 144 mg/dL (22 May 2025 21:18)  POCT Blood Glucose.: 167 mg/dL (22 May 2025 16:49)  POCT Blood Glucose.: 160 mg/dL (22 May 2025 12:05)      CULTURE DATA:      RADIOLOGY & ADDITIONAL TESTS: BRIEF HOSPITAL COURSE SUMMARY:   74M with PMHX CVA c/b residual aphasia/R hemiparesis on Plavix, CAD s/p PCI, HTN, HLD, DM2 admitted to [place] for hematuria and underwent cystoscopy with clot evacuation c/b septic shock found to have bladder perforation, BL Pneumothorax, and Pneumomediastinum. R Chest tube placed at [place] and removed prior to transfer to [facility] for Urological evaluation. Admitted to SICU now s/p ex-lap and anterior bladder repair on 5/17/25 with ACS and Urology. SICU course complicated by elevated troponin. TTE TDS due to overlying air. BIJAL with LVEF 45-50% and +RWMA. Cardio and Interventional Cardio reccs appreciated. Eventual ischemic workup but no plan for LHC at this time/hospitalization. GDMT restarted. Diuresed with Lasix/Diuril today net negative 4L. Extuabted yesterdayt to RA. Completed Meropenem and off vasopressors. Urology managing JOSHUA/SPC/Morris.       LAST 24 HOURS:  - no acute overnight events reported    TODAY:  Pt seen and examined at the bedside this morning. Offers no acute complaints. Denies fever, chills, N/V/D, abdominal pain, HA, CP, SOB, or dizziness.        MEDICATIONS  (STANDING):  allopurinol 100 milliGRAM(s) Oral daily  aspirin  chewable 81 milliGRAM(s) Oral daily  atorvastatin 20 milliGRAM(s) Oral at bedtime  carvedilol 25 milliGRAM(s) Oral every 12 hours  chlorhexidine 2% Cloths 1 Application(s) Topical daily  enoxaparin Injectable 40 milliGRAM(s) SubCutaneous every 24 hours  finasteride 5 milliGRAM(s) Oral daily  insulin glargine Injectable (LANTUS) 20 Unit(s) SubCutaneous at bedtime  insulin lispro (ADMELOG) corrective regimen sliding scale   SubCutaneous Before meals and at bedtime  insulin lispro Injectable (ADMELOG) 5 Unit(s) SubCutaneous three times a day before meals  levothyroxine 50 MICROGram(s) Oral daily  lisinopril 5 milliGRAM(s) Oral daily  melatonin 3 milliGRAM(s) Oral at bedtime  polyethylene glycol 3350 17 Gram(s) Oral every 24 hours  QUEtiapine 12.5 milliGRAM(s) Oral at bedtime  senna 2 Tablet(s) Oral at bedtime    MEDICATIONS  (PRN):  acetaminophen     Tablet .. 975 milliGRAM(s) Oral every 6 hours PRN Mild Pain (1 - 3), Moderate Pain (4 - 6), Severe Pain (7 - 10)    Allergies    No Known Allergies    Intolerances      REVIEW OF SYSTEMS:  as above      Vital Signs Last 24 Hrs  T(C): 36.4 (23 May 2025 08:55), Max: 37.7 (22 May 2025 14:00)  T(F): 97.5 (23 May 2025 08:55), Max: 99.9 (22 May 2025 14:00)  HR: 72 (23 May 2025 08:55) (60 - 103)  BP: 147/79 (23 May 2025 08:55) (133/62 - 176/91)  BP(mean): 111 (22 May 2025 17:00) (83 - 112)  RR: 18 (23 May 2025 08:55) (18 - 31)  SpO2: 96% (23 May 2025 08:55) (94% - 100%)    Parameters below as of 23 May 2025 04:45  Patient On (Oxygen Delivery Method): BiPAP/CPAP        PHYSICAL EXAM:  General: NAD, well appearing  HEENT: NCAT. PERRLA. Neck supple, no JVD  Pulm: CTAB  Cardiac: RRR  Abdomen: Soft, Nontender and nondistended  : +Morris Catheter +SPC +JOSHUA Drain  Skin: Warm, dry and intact without rashes or lesions.  Neuro: AAOx3, no gross focal deficits  MSK: ALMARAZ spontaneously  Extremities: Mild edema in B/L UE and LE      LABS:                        8.9    9.00  )-----------( 154      ( 23 May 2025 05:04 )             29.6     05-23    143  |  109[H]  |  15.9  ----------------------------<  131[H]  4.0   |  23.0  |  0.69    Ca    7.5[L]      23 May 2025 05:04  Phos  2.7     05-23  Mg     2.0     05-23    TPro  4.5[L]  /  Alb  2.3[L]  /  TBili  0.5  /  DBili  x   /  AST  12  /  ALT  13  /  AlkPhos  77  05-23      Urinalysis Basic - ( 23 May 2025 05:04 )    Color: x / Appearance: x / SG: x / pH: x  Gluc: 131 mg/dL / Ketone: x  / Bili: x / Urobili: x   Blood: x / Protein: x / Nitrite: x   Leuk Esterase: x / RBC: x / WBC x   Sq Epi: x / Non Sq Epi: x / Bacteria: x      CAPILLARY BLOOD GLUCOSE      POCT Blood Glucose.: 140 mg/dL (23 May 2025 08:03)  POCT Blood Glucose.: 144 mg/dL (22 May 2025 21:18)  POCT Blood Glucose.: 167 mg/dL (22 May 2025 16:49)  POCT Blood Glucose.: 160 mg/dL (22 May 2025 12:05)      CULTURE DATA:      RADIOLOGY & ADDITIONAL TESTS: BRIEF HOSPITAL COURSE SUMMARY:   74M with PMHX CVA c/b residual aphasia/R hemiparesis on Plavix, CAD s/p PCI, HTN, HLD, DM2 admitted to [place] for hematuria and underwent cystoscopy with clot evacuation c/b septic shock found to have bladder perforation, BL Pneumothorax, and Pneumomediastinum. R Chest tube placed at [place] and removed prior to transfer to [facility] for Urological evaluation. Admitted to SICU now s/p ex-lap and anterior bladder repair on 5/17/25 with ACS and Urology. SICU course complicated by elevated troponin. TTE TDS due to overlying air. BIJAL with LVEF 45-50% and +RWMA. Cardio and Interventional Cardio reccs appreciated. Eventual ischemic workup but no plan for LHC at this time/hospitalization. GDMT restarted. Diuresed with Lasix/Diuril today net negative 4L. Extuabted yesterdayt to RA. Completed Meropenem and off vasopressors. Urology managing JOSHUA/SPC/Morris.       LAST 24 HOURS:  - no acute overnight events reported    TODAY:  Pt seen and examined at the bedside this morning. Offers no acute complaints. Denies fever, chills, N/V/D, abdominal pain, HA, CP, SOB, or dizziness.        MEDICATIONS  (STANDING):  allopurinol 100 milliGRAM(s) Oral daily  aspirin  chewable 81 milliGRAM(s) Oral daily  atorvastatin 20 milliGRAM(s) Oral at bedtime  carvedilol 25 milliGRAM(s) Oral every 12 hours  chlorhexidine 2% Cloths 1 Application(s) Topical daily  enoxaparin Injectable 40 milliGRAM(s) SubCutaneous every 24 hours  finasteride 5 milliGRAM(s) Oral daily  insulin glargine Injectable (LANTUS) 20 Unit(s) SubCutaneous at bedtime  insulin lispro (ADMELOG) corrective regimen sliding scale   SubCutaneous Before meals and at bedtime  insulin lispro Injectable (ADMELOG) 5 Unit(s) SubCutaneous three times a day before meals  levothyroxine 50 MICROGram(s) Oral daily  lisinopril 5 milliGRAM(s) Oral daily  melatonin 3 milliGRAM(s) Oral at bedtime  polyethylene glycol 3350 17 Gram(s) Oral every 24 hours  QUEtiapine 12.5 milliGRAM(s) Oral at bedtime  senna 2 Tablet(s) Oral at bedtime    MEDICATIONS  (PRN):  acetaminophen     Tablet .. 975 milliGRAM(s) Oral every 6 hours PRN Mild Pain (1 - 3), Moderate Pain (4 - 6), Severe Pain (7 - 10)    Allergies    No Known Allergies    Intolerances      REVIEW OF SYSTEMS:  as above      Vital Signs Last 24 Hrs  T(C): 36.4 (23 May 2025 08:55), Max: 37.7 (22 May 2025 14:00)  T(F): 97.5 (23 May 2025 08:55), Max: 99.9 (22 May 2025 14:00)  HR: 72 (23 May 2025 08:55) (60 - 103)  BP: 147/79 (23 May 2025 08:55) (133/62 - 176/91)  BP(mean): 111 (22 May 2025 17:00) (83 - 112)  RR: 18 (23 May 2025 08:55) (18 - 31)  SpO2: 96% (23 May 2025 08:55) (94% - 100%)    Parameters below as of 23 May 2025 04:45  Patient On (Oxygen Delivery Method): BiPAP/CPAP        PHYSICAL EXAM:  General: NAD, well appearing  HEENT: NCAT. PERRLA. Neck supple, no JVD  Pulm: CTAB  Cardiac: RRR  Abdomen: Soft, Nontender and nondistended  : +Morris Catheter +SPC +JOSHUA Drain  Skin: Warm, dry and intact without rashes or lesions  Neuro: AAOx3, right hemiparesis, expressive aphasia  Extremities: Mild edema in B/L UE. No edema in B/L LE        LABS:                        8.9    9.00  )-----------( 154      ( 23 May 2025 05:04 )             29.6     05-23    143  |  109[H]  |  15.9  ----------------------------<  131[H]  4.0   |  23.0  |  0.69    Ca    7.5[L]      23 May 2025 05:04  Phos  2.7     05-23  Mg     2.0     05-23    TPro  4.5[L]  /  Alb  2.3[L]  /  TBili  0.5  /  DBili  x   /  AST  12  /  ALT  13  /  AlkPhos  77  05-23      Urinalysis Basic - ( 23 May 2025 05:04 )    Color: x / Appearance: x / SG: x / pH: x  Gluc: 131 mg/dL / Ketone: x  / Bili: x / Urobili: x   Blood: x / Protein: x / Nitrite: x   Leuk Esterase: x / RBC: x / WBC x   Sq Epi: x / Non Sq Epi: x / Bacteria: x      CAPILLARY BLOOD GLUCOSE      POCT Blood Glucose.: 140 mg/dL (23 May 2025 08:03)  POCT Blood Glucose.: 144 mg/dL (22 May 2025 21:18)  POCT Blood Glucose.: 167 mg/dL (22 May 2025 16:49)  POCT Blood Glucose.: 160 mg/dL (22 May 2025 12:05)      CULTURE DATA:      RADIOLOGY & ADDITIONAL TESTS:

## 2025-05-23 NOTE — PROGRESS NOTE ADULT - ASSESSMENT
74M transferred from [facility] with Hx of CVA (on home plavix) with residual aphasia and R hemiparesis, HTN, HLD, DM2, CAD s/p PCI. Patient had been admitted for continued hematuria and underwent cysto with clot evacuation.   Patient was found to be hypotensive and required pressors.  Also, patient was found to have largeamount of free air and fluid in abdomen due to bladder perforation and bilateral pneumothorax and pneumomediastinum.   Patient had chest tube placed on Right side but was subsequently removed before transferred.  Patient then Admitted to SICU for resuscitation.  With CT findings of air and fluid filled abdomen and bilateral pneumothorax, assumed to be form bladder perforation after cysto and clot evac; patient underwent a complex cystorrhaphy for bladder rupture on 17-May-2025.   04:24:14/ Exploratory laparotomy 17-May-2025.    A cardiac consult for troponin leak and unable to see wall motion on TTE due to air.  Per wife patient has known CAD and had a LHC in 2019 with Dr. Morales and has 2 blockages.  Per wife 100% blockage of dLCX x 2.

## 2025-05-23 NOTE — CHART NOTE - NSCHARTNOTEFT_GEN_A_CORE
Source: Patient [ x]  Family [x ]   other [x] chart, staff     Current Diet:   Diet, Consistent Carbohydrate w/Evening Snack:   Supplement Feeding Modality:  Oral  Ensure Surgery Cans or Servings Per Day:  1       Frequency:  Three Times a day (05-21-25 @ 17:56) [Active]    Patient reports [ ] nausea  [ ] vomiting [ ] diarrhea [x ] constipation  [ ]chewing problems [ ] swallowing issues  [ ] other: No BM documented     PO intake:  < 50% [ ]   50-75%  [ ]   %  [ x]  other :    Source for PO intake [ x] Patient [x ] family [x ] chart [ ] staff [ ] other    Current Weight:   5/23 288.3#   5/16 296.5#  5/18 295.4#  2+ mild generalized edema  3+ moderate to L wrist    % Weight Change: Unclear accuracy of weight 2/2 inconsistency     Pertinent Medications: MEDICATIONS  (STANDING):  insulin lispro Injectable (ADMELOG) 5 Unit(s) SubCutaneous three times a day before meals  levothyroxine 50 MICROGram(s) Oral daily  polyethylene glycol 3350 17 Gram(s) Oral every 24 hours    Pertinent Labs: 05-23 Na143 mmol/L Glu 131 mg/dL[H] K+ 4.0 mmol/L Cr  0.69 mg/dL BUN 15.9 mg/dL Phos 2.7 mg/dL Alb 2.3 g/dL[L]   Labs reviewed.     Skin: surgical incision to midline abdomen     Estimated Needs:   [x ] no change since previous assessment  [ ] recalculated:     74M with PMHX CVA c/b residual aphasia/R hemiparesis on Plavix, CAD s/p PCI, HTN, HLD, DM2 admitted to [facility] for hematuria and underwent cystoscopy with clot evacuation c/b septic shock found to have bladder perforation, BL Pneumothorax, and Pneumomediastinum transferred to [facility] SICU for emergent ex-lap and anterior bladder repair 5/17/25 complicated Septic Shock, NSTEMI, and Acute on Chronc HF Exacerbation.    Current Nutrition Diagnosis: Increased nutrient needs (protein) related to increased physiological demand of nutrient to promote healing as evidenced by complicated Septic Shock, NSTEMI, and Acute on Chronc HF Exacerbation    Spoke with pt this AM and family at bedside. Diet advanced 5/21. Tolerating diet well at this time with good po intake. Constipation noted. No questions/concerns at this time. RD to remain available.     Recommendations:   - Monitor weights for trend/accuracy  - Continue diet as tolerated.  - Rx MVI daily, vit C 500mg     Monitoring and Evaluation:   [ x] PO intake [x ] Tolerance to diet prescription [X] Weights  [X] Follow up per protocol [X] Labs:

## 2025-05-23 NOTE — PROGRESS NOTE ADULT - ATTENDING COMMENTS
Agree with above   Patient seen and examined together with medical residents. Wife at bedside. Pt himself awake and follows commands, but not able to provide any ROS. Denies any pain or discomfort   Vital signs,  labs and imaging  reviewed. All stable   Assessment and plan as above and discussed with pts wife   He is a 72 yo male just down graded from ICU s/p septic shock secondary to perforated bladder, s/p exploratory laparotomy, b/l pneumothorax , s/o intubation and extubation now   Shock resolved. BP stable   Completed course of antibiotics   JOSHUA tube still in place and draining.   IN addition to above, will start Furosemide 40 mg po daily   The rest as above   Discussed with wife in detail

## 2025-05-23 NOTE — PROGRESS NOTE ADULT - PROBLEM SELECTOR PLAN 2
.  -  Troponin 291->362->364.  - no s/s ACS, EKG NSR without ischemic changes  - LHC 5/13/2019: LM normal LAD normal, pCirc 70%, dCirc 100%, collatera ls from first marginal, mRCA 90%, rPLV 80%. Per OP notes, RCA lesion will need rotablation prior to multiple stents  - BIJAL 5/20/25:  EF 45-50% (Newly reduced from baseline 60%), apical anterior wall motion hypokinesis.    - consider repeat TTE in a few days to assess if EF still reduced  - case discussed with interventionalist Dr. Madrid, Interventional cardiology, no plan for [facility] at this time given chronicity/high risk CAD, lack of symptoms and inability to tolerate DAPT  - troponin peak now downtrending, likely demand ischemia i/s/o surgery/sepsis/blood loss, although unable to fully r/o progressive CAD  - opt for medical management at this time, cont asa, statin, BB, ACEi

## 2025-05-23 NOTE — PROGRESS NOTE ADULT - PROBLEM SELECTOR PROBLEM 1
HFrEF (heart failure with reduced ejection fraction)
Elevated troponin
HFrEF (heart failure with reduced ejection fraction)

## 2025-05-23 NOTE — PROGRESS NOTE ADULT - NS ATTEND OPT1A GEN_ALL_CORE
Medical decision making
History/Exam/Medical decision making
History/Exam
Medical decision making
History/Exam/Medical decision making

## 2025-05-23 NOTE — PROGRESS NOTE ADULT - PROBLEM SELECTOR PLAN 1
.  - presented for hypotension requiring vasopressor support  - TTE performed nondiagnostic due to free air obstructing view  - now s/p BIJAL 5/20/25:   EF 45-50%, (Newly reduced from baseline of 60%), apical anterior wall motion hypokinesis.  - still with some bradycardia overnight to 50s, but need for BP control and having frequent PVCs  - recommend change metoprolol back to Coreg 25mg PO BID with hold parameters for further BP control  - Monitor on telemetry.  Strict i/o and daily weights.  Keep K > 4, Mg > 2.  Monitor renal function.  - possibly with Afib on tele? continue to monitor, daily EKGS.

## 2025-05-24 LAB
ALBUMIN SERPL ELPH-MCNC: 2.5 G/DL — LOW (ref 3.3–5.2)
ALP SERPL-CCNC: 82 U/L — SIGNIFICANT CHANGE UP (ref 40–120)
ALT FLD-CCNC: 13 U/L — SIGNIFICANT CHANGE UP
ANION GAP SERPL CALC-SCNC: 10 MMOL/L — SIGNIFICANT CHANGE UP (ref 5–17)
AST SERPL-CCNC: 14 U/L — SIGNIFICANT CHANGE UP
BASOPHILS # BLD AUTO: 0.01 K/UL — SIGNIFICANT CHANGE UP (ref 0–0.2)
BASOPHILS NFR BLD AUTO: 0.1 % — SIGNIFICANT CHANGE UP (ref 0–2)
BILIRUB SERPL-MCNC: 0.4 MG/DL — SIGNIFICANT CHANGE UP (ref 0.4–2)
BUN SERPL-MCNC: 14.5 MG/DL — SIGNIFICANT CHANGE UP (ref 8–20)
CALCIUM SERPL-MCNC: 7.2 MG/DL — LOW (ref 8.4–10.5)
CHLORIDE SERPL-SCNC: 105 MMOL/L — SIGNIFICANT CHANGE UP (ref 96–108)
CO2 SERPL-SCNC: 28 MMOL/L — SIGNIFICANT CHANGE UP (ref 22–29)
CREAT SERPL-MCNC: 0.69 MG/DL — SIGNIFICANT CHANGE UP (ref 0.5–1.3)
EGFR: 97 ML/MIN/1.73M2 — SIGNIFICANT CHANGE UP
EGFR: 97 ML/MIN/1.73M2 — SIGNIFICANT CHANGE UP
EOSINOPHIL # BLD AUTO: 0.19 K/UL — SIGNIFICANT CHANGE UP (ref 0–0.5)
EOSINOPHIL NFR BLD AUTO: 2.3 % — SIGNIFICANT CHANGE UP (ref 0–6)
GLUCOSE BLDC GLUCOMTR-MCNC: 193 MG/DL — HIGH (ref 70–99)
GLUCOSE BLDC GLUCOMTR-MCNC: 282 MG/DL — HIGH (ref 70–99)
GLUCOSE BLDC GLUCOMTR-MCNC: 283 MG/DL — HIGH (ref 70–99)
GLUCOSE BLDC GLUCOMTR-MCNC: 288 MG/DL — HIGH (ref 70–99)
GLUCOSE SERPL-MCNC: 148 MG/DL — HIGH (ref 70–99)
HCT VFR BLD CALC: 30.2 % — LOW (ref 39–50)
HGB BLD-MCNC: 9.4 G/DL — LOW (ref 13–17)
IMM GRANULOCYTES # BLD AUTO: 0.05 K/UL — SIGNIFICANT CHANGE UP (ref 0–0.07)
IMM GRANULOCYTES NFR BLD AUTO: 0.6 % — SIGNIFICANT CHANGE UP (ref 0–0.9)
LYMPHOCYTES # BLD AUTO: 1.03 K/UL — SIGNIFICANT CHANGE UP (ref 1–3.3)
LYMPHOCYTES NFR BLD AUTO: 12.6 % — LOW (ref 13–44)
MAGNESIUM SERPL-MCNC: 1.6 MG/DL — SIGNIFICANT CHANGE UP (ref 1.6–2.6)
MCHC RBC-ENTMCNC: 28.6 PG — SIGNIFICANT CHANGE UP (ref 27–34)
MCHC RBC-ENTMCNC: 31.1 G/DL — LOW (ref 32–36)
MCV RBC AUTO: 91.8 FL — SIGNIFICANT CHANGE UP (ref 80–100)
MONOCYTES # BLD AUTO: 0.6 K/UL — SIGNIFICANT CHANGE UP (ref 0–0.9)
MONOCYTES NFR BLD AUTO: 7.3 % — SIGNIFICANT CHANGE UP (ref 2–14)
NEUTROPHILS # BLD AUTO: 6.3 K/UL — SIGNIFICANT CHANGE UP (ref 1.8–7.4)
NEUTROPHILS NFR BLD AUTO: 77.1 % — HIGH (ref 43–77)
NRBC # BLD AUTO: 0 K/UL — SIGNIFICANT CHANGE UP (ref 0–0)
NRBC # FLD: 0 K/UL — SIGNIFICANT CHANGE UP (ref 0–0)
NRBC BLD AUTO-RTO: 0 /100 WBCS — SIGNIFICANT CHANGE UP (ref 0–0)
PHOSPHATE SERPL-MCNC: 2.5 MG/DL — SIGNIFICANT CHANGE UP (ref 2.4–4.7)
PLATELET # BLD AUTO: 182 K/UL — SIGNIFICANT CHANGE UP (ref 150–400)
PMV BLD: 12 FL — SIGNIFICANT CHANGE UP (ref 7–13)
POTASSIUM SERPL-MCNC: 3.4 MMOL/L — LOW (ref 3.5–5.3)
POTASSIUM SERPL-SCNC: 3.4 MMOL/L — LOW (ref 3.5–5.3)
PROT SERPL-MCNC: 4.7 G/DL — LOW (ref 6.6–8.7)
RBC # BLD: 3.29 M/UL — LOW (ref 4.2–5.8)
RBC # FLD: 16.8 % — HIGH (ref 10.3–14.5)
SODIUM SERPL-SCNC: 143 MMOL/L — SIGNIFICANT CHANGE UP (ref 135–145)
WBC # BLD: 8.18 K/UL — SIGNIFICANT CHANGE UP (ref 3.8–10.5)
WBC # FLD AUTO: 8.18 K/UL — SIGNIFICANT CHANGE UP (ref 3.8–10.5)

## 2025-05-24 PROCEDURE — 99497 ADVNCD CARE PLAN 30 MIN: CPT

## 2025-05-24 PROCEDURE — 99232 SBSQ HOSP IP/OBS MODERATE 35: CPT

## 2025-05-24 PROCEDURE — 99233 SBSQ HOSP IP/OBS HIGH 50: CPT

## 2025-05-24 RX ORDER — OLANZAPINE 10 MG/1
2.5 TABLET ORAL
Refills: 0 | Status: DISCONTINUED | OUTPATIENT
Start: 2025-05-24 | End: 2025-05-25

## 2025-05-24 RX ORDER — LOSARTAN POTASSIUM 100 MG/1
25 TABLET, FILM COATED ORAL DAILY
Refills: 0 | Status: DISCONTINUED | OUTPATIENT
Start: 2025-05-24 | End: 2025-05-25

## 2025-05-24 RX ORDER — MELATONIN 5 MG
5 TABLET ORAL
Refills: 0 | Status: DISCONTINUED | OUTPATIENT
Start: 2025-05-24 | End: 2025-05-25

## 2025-05-24 RX ADMIN — INSULIN LISPRO 3: 100 INJECTION, SOLUTION INTRAVENOUS; SUBCUTANEOUS at 21:28

## 2025-05-24 RX ADMIN — Medication 81 MILLIGRAM(S): at 13:04

## 2025-05-24 RX ADMIN — Medication 40 MILLIEQUIVALENT(S): at 17:28

## 2025-05-24 RX ADMIN — INSULIN LISPRO 5 UNIT(S): 100 INJECTION, SOLUTION INTRAVENOUS; SUBCUTANEOUS at 08:50

## 2025-05-24 RX ADMIN — CARVEDILOL 25 MILLIGRAM(S): 3.12 TABLET, FILM COATED ORAL at 17:28

## 2025-05-24 RX ADMIN — Medication 50 MICROGRAM(S): at 05:14

## 2025-05-24 RX ADMIN — Medication 5 MILLIGRAM(S): at 17:27

## 2025-05-24 RX ADMIN — INSULIN GLARGINE-YFGN 20 UNIT(S): 100 INJECTION, SOLUTION SUBCUTANEOUS at 21:27

## 2025-05-24 RX ADMIN — Medication 40 MILLIEQUIVALENT(S): at 13:18

## 2025-05-24 RX ADMIN — Medication 2 TABLET(S): at 21:28

## 2025-05-24 RX ADMIN — FUROSEMIDE 40 MILLIGRAM(S): 10 INJECTION INTRAMUSCULAR; INTRAVENOUS at 05:14

## 2025-05-24 RX ADMIN — INSULIN LISPRO 3: 100 INJECTION, SOLUTION INTRAVENOUS; SUBCUTANEOUS at 13:14

## 2025-05-24 RX ADMIN — CARVEDILOL 25 MILLIGRAM(S): 3.12 TABLET, FILM COATED ORAL at 05:14

## 2025-05-24 RX ADMIN — LOSARTAN POTASSIUM 25 MILLIGRAM(S): 100 TABLET, FILM COATED ORAL at 21:29

## 2025-05-24 RX ADMIN — LISINOPRIL 5 MILLIGRAM(S): 5 TABLET ORAL at 05:14

## 2025-05-24 RX ADMIN — ATORVASTATIN CALCIUM 20 MILLIGRAM(S): 80 TABLET, FILM COATED ORAL at 21:28

## 2025-05-24 RX ADMIN — FINASTERIDE 5 MILLIGRAM(S): 1 TABLET, FILM COATED ORAL at 13:05

## 2025-05-24 RX ADMIN — INSULIN LISPRO 5 UNIT(S): 100 INJECTION, SOLUTION INTRAVENOUS; SUBCUTANEOUS at 17:29

## 2025-05-24 RX ADMIN — ENOXAPARIN SODIUM 40 MILLIGRAM(S): 100 INJECTION SUBCUTANEOUS at 05:14

## 2025-05-24 RX ADMIN — POLYETHYLENE GLYCOL 3350 17 GRAM(S): 17 POWDER, FOR SOLUTION ORAL at 13:06

## 2025-05-24 RX ADMIN — INSULIN LISPRO 5 UNIT(S): 100 INJECTION, SOLUTION INTRAVENOUS; SUBCUTANEOUS at 13:07

## 2025-05-24 RX ADMIN — INSULIN LISPRO 3: 100 INJECTION, SOLUTION INTRAVENOUS; SUBCUTANEOUS at 17:29

## 2025-05-24 RX ADMIN — INSULIN LISPRO 1: 100 INJECTION, SOLUTION INTRAVENOUS; SUBCUTANEOUS at 08:50

## 2025-05-24 RX ADMIN — Medication 100 MILLIGRAM(S): at 13:04

## 2025-05-24 RX ADMIN — Medication 1 APPLICATION(S): at 13:06

## 2025-05-24 RX ADMIN — OLANZAPINE 2.5 MILLIGRAM(S): 10 TABLET ORAL at 17:28

## 2025-05-24 NOTE — OCCUPATIONAL THERAPY INITIAL EVALUATION ADULT - ORIENTATION, REHAB EVAL
Pt A&O to name, month and day of  but states 1949 for year. Pt oriented to current month but not year (given choices for year but still unable reports ), pt oriented to location with choices.

## 2025-05-24 NOTE — OCCUPATIONAL THERAPY INITIAL EVALUATION ADULT - NS ASR FOLLOW COMMAND OT EVAL
cognitive impairments +aphasia requires repetition, demonstration, hand over hand and increased time/75% of the time/aphasia/oral apraxia

## 2025-05-24 NOTE — CHART NOTE - NSCHARTNOTEFT_GEN_A_CORE
Patient is on her own cpap machine for nocturnal use. No apparent respiratory distress noted at this time. Will continue to monitor

## 2025-05-24 NOTE — PROGRESS NOTE ADULT - SUBJECTIVE AND OBJECTIVE BOX
Subjective: 74y Male seen at bedside. Pt resting comfortably. No events overnight. No acute urological complaints.     STATUS POST:  Ex-lap, bladder perforation repair, and SPT placement    POST OPERATIVE DAY #: 7    MEDICATIONS  (STANDING):  allopurinol 100 milliGRAM(s) Oral daily  aspirin  chewable 81 milliGRAM(s) Oral daily  atorvastatin 20 milliGRAM(s) Oral at bedtime  carvedilol 25 milliGRAM(s) Oral every 12 hours  chlorhexidine 2% Cloths 1 Application(s) Topical daily  enoxaparin Injectable 40 milliGRAM(s) SubCutaneous every 24 hours  finasteride 5 milliGRAM(s) Oral daily  furosemide    Tablet 40 milliGRAM(s) Oral daily  insulin glargine Injectable (LANTUS) 20 Unit(s) SubCutaneous at bedtime  insulin lispro (ADMELOG) corrective regimen sliding scale   SubCutaneous Before meals and at bedtime  insulin lispro Injectable (ADMELOG) 5 Unit(s) SubCutaneous three times a day before meals  levothyroxine 50 MICROGram(s) Oral daily  losartan 25 milliGRAM(s) Oral daily  melatonin 3 milliGRAM(s) Oral at bedtime  polyethylene glycol 3350 17 Gram(s) Oral every 24 hours  QUEtiapine 12.5 milliGRAM(s) Oral at bedtime  senna 2 Tablet(s) Oral at bedtime    MEDICATIONS  (PRN):  acetaminophen     Tablet .. 975 milliGRAM(s) Oral every 6 hours PRN Mild Pain (1 - 3), Moderate Pain (4 - 6), Severe Pain (7 - 10)      Vital Signs Last 24 Hrs  T(C): 36.4 (24 May 2025 09:16), Max: 37.2 (23 May 2025 20:30)  T(F): 97.6 (24 May 2025 09:16), Max: 98.9 (23 May 2025 20:30)  HR: 69 (24 May 2025 09:16) (65 - 92)  BP: 154/81 (24 May 2025 09:16) (145/75 - 164/82)  BP(mean): --  RR: 18 (24 May 2025 09:16) (18 - 18)  SpO2: 97% (24 May 2025 09:16) (93% - 97%)    Parameters below as of 24 May 2025 09:16  Patient On (Oxygen Delivery Method): nasal cannula  O2 Flow (L/min): 2        05-23 - 05-24  --------------------------------------------------------  IN:  Total IN: 0 mL    OUT:    Bulb (mL): 150 mL    Indwelling Catheter - Urethral (mL): 3450 mL  Total OUT: 3600 mL    Total NET: -3600 mL      05-24 - 05-24  --------------------------------------------------------  IN:  Total IN: 0 mL    OUT:    Voided (mL): 1500 mL  Total OUT: 1500 mL    Total NET: -1500 mL          PHYSICAL EXAM   General: NAD  Respiratory: Respirations non-labored; no accessory muscle use  ABD: Soft, non-tender, non-distended. Midline incision intact. SPT capped and in place. R drain clear, yellow fluid   : Stout catheter in place draining clear, yellow urine  Skin: Warm, dry. No cyanosis     LABS:                        9.4    8.18  )-----------( 182      ( 24 May 2025 05:15 )             30.2     05-24    143  |  105  |  14.5  ----------------------------<  148[H]  3.4[L]   |  28.0  |  0.69    Ca    7.2[L]      24 May 2025 05:15  Phos  2.5     05-24  Mg     1.6     05-24    TPro  4.7[L]  /  Alb  2.5[L]  /  TBili  0.4  /  DBili  x   /  AST  14  /  ALT  13  /  AlkPhos  82  05-24      Urinalysis Basic - ( 24 May 2025 05:15 )    Color: x / Appearance: x / SG: x / pH: x  Gluc: 148 mg/dL / Ketone: x  / Bili: x / Urobili: x   Blood: x / Protein: x / Nitrite: x   Leuk Esterase: x / RBC: x / WBC x   Sq Epi: x / Non Sq Epi: x / Bacteria: x      ASSESSMENT:  74y Male tx from [facility] with bladder perforation, b/l pneumothorax, pneumomediastinum, NSTEMI, fluid overload s/p ex lap, anterior bladder wall repair, and SPT insertion POD #7        PLAN:  Maintain stout catheter, DO NOT REMOVE  Monitor JOSHUA drain output   OOB to chair   DVT ppx: lovenox   Continue care as per primary team   Urology will continue to follow

## 2025-05-24 NOTE — GOALS OF CARE CONVERSATION - ADVANCED CARE PLANNING - CONVERSATION DETAILS
Discussed meaning of  CPR and intubation  Family happy that pt did well this time  and want to try everything  FULL CODE

## 2025-05-24 NOTE — PROGRESS NOTE ADULT - SUBJECTIVE AND OBJECTIVE BOX
HEALTH ISSUES - PROBLEM Dx:    CC- complicated course, delirium    INTERVAL HPI/OVERNIGHT EVENTS:    wife at bedside states delirium ntoed last night and family was able to stay up thro the night and manag ehim  delirium precautiosn and medications discussed  otherwise no complaints    REVIEW OF SYSTEMS:    as above    Vital Signs Last 24 Hrs  T(C): 36.3 (24 May 2025 11:23), Max: 37.2 (23 May 2025 20:30)  T(F): 97.4 (24 May 2025 11:23), Max: 98.9 (23 May 2025 20:30)  HR: 85 (24 May 2025 11:23) (65 - 92)  BP: 146/72 (24 May 2025 11:23) (145/75 - 164/82)  BP(mean): --  RR: 18 (24 May 2025 11:23) (18 - 18)  SpO2: 93% (24 May 2025 11:23) (93% - 97%)    Parameters below as of 24 May 2025 11:23  Patient On (Oxygen Delivery Method): nasal cannula  O2 Flow (L/min): 2      PHYSICAL EXAM-  GENERAL: Lying in bed, no distress, [place]  HEAD:  Atraumatic, Normocephalic  EYES: EOMI, PERRLA, conjunctiva and sclera clear  ENMT: Moist mucous membranes, No lesions  NECK: Supple, No JVD, Normal thyroid  NERVOUS SYSTEM:  Alert & Oriented X 3, Moves spontaneously on left  CHEST/LUNG: Clear to auscultate bilaterally; No rales, rhonchi, wheezing, or rubs  HEART: Regular rate and rhythm; No murmurs, rubs, or gallops  ABDOMEN: Soft, Nontender, Nondistended; Bowel sounds present; SPC + capped, J drain in place, abd staples intact and clean, Morris +  EXTREMITIES:  2+ Peripheral Pulses, No clubbing, cyanosis; elliott UE edema  SKIN: No rashes or lesions    MEDICATIONS  (STANDING):  allopurinol 100 milliGRAM(s) Oral daily  aspirin  chewable 81 milliGRAM(s) Oral daily  atorvastatin 20 milliGRAM(s) Oral at bedtime  carvedilol 25 milliGRAM(s) Oral every 12 hours  chlorhexidine 2% Cloths 1 Application(s) Topical daily  enoxaparin Injectable 40 milliGRAM(s) SubCutaneous every 24 hours  finasteride 5 milliGRAM(s) Oral daily  furosemide    Tablet 40 milliGRAM(s) Oral daily  insulin glargine Injectable (LANTUS) 20 Unit(s) SubCutaneous at bedtime  insulin lispro (ADMELOG) corrective regimen sliding scale   SubCutaneous Before meals and at bedtime  insulin lispro Injectable (ADMELOG) 5 Unit(s) SubCutaneous three times a day before meals  levothyroxine 50 MICROGram(s) Oral daily  losartan 25 milliGRAM(s) Oral daily  melatonin 5 milliGRAM(s) Oral <User Schedule>  OLANZapine 2.5 milliGRAM(s) Oral <User Schedule>  polyethylene glycol 3350 17 Gram(s) Oral every 24 hours  potassium chloride   Powder 40 milliEquivalent(s) Oral every 4 hours  senna 2 Tablet(s) Oral at bedtime    MEDICATIONS  (PRN):  acetaminophen     Tablet .. 975 milliGRAM(s) Oral every 6 hours PRN Mild Pain (1 - 3), Moderate Pain (4 - 6), Severe Pain (7 - 10)      LABS:                        9.4    8.18  )-----------( 182      ( 24 May 2025 05:15 )             30.2     05-24    143  |  105  |  14.5  ----------------------------<  148[H]  3.4[L]   |  28.0  |  0.69    Ca    7.2[L]      24 May 2025 05:15  Phos  2.5     05-24  Mg     1.6     05-24    TPro  4.7[L]  /  Alb  2.5[L]  /  TBili  0.4  /  DBili  x   /  AST  14  /  ALT  13  /  AlkPhos  82  05-24      Urinalysis Basic - ( 24 May 2025 05:15 )    Color: x / Appearance: x / SG: x / pH: x  Gluc: 148 mg/dL / Ketone: x  / Bili: x / Urobili: x   Blood: x / Protein: x / Nitrite: x   Leuk Esterase: x / RBC: x / WBC x   Sq Epi: x / Non Sq Epi: x / Bacteria: x                Xray Chest 1 View-PORTABLE IMMEDIATE:   ACC: [id number] EXAM:  XR CHEST PORTABLE IMMED 1V   ORDERED BY: EUGENE BAKER     PROCEDURE DATE:  05/20/2025          INTERPRETATION:  AP chest on May 20, 2025 at 4:01 PM. There is history of   pneumothorax.    Heart normal for projection.    Endotracheal tube and nasogastric tube remain.    There is a persistent mild left base effusion showing some improvement   from 819. No visible pneumothorax.    Small air collection lateral to the right rib cage is noted.    IMPRESSION: Left effusion improving. Small air collection lateral to the   right rib cage noted.    --- End of Report ---            CRISTINO ROBLES MD; Attending Radiologist  This document has been electronically signed. May 20 2025  4:35PM (05-20-25 @ 16:31)    CT scan

## 2025-05-24 NOTE — OCCUPATIONAL THERAPY INITIAL EVALUATION ADULT - GENERAL OBSERVATIONS, REHAB EVAL
Pt left as received, semifolwer in bed, NAD,+IV lock, +JOSHUA drain, +Morris, +Tele, on RA aphasic but A&Ox2-3 with choices, wife present. Pre/post pain 0/10. Pt agreeable to OT evaluation. Pt left as received, semifolwer in bed, NAD,+IV lock, +JOSHUA drain, +Morris, +Tele, +b/l VCB and heel offloaders on RA aphasic but A&Ox2-3 with choices, wife present. Pre/post pain 0/10. Pt agreeable to OT evaluation.

## 2025-05-24 NOTE — OCCUPATIONAL THERAPY INITIAL EVALUATION ADULT - IMPAIRED TRANSFERS: SIT/STAND, REHAB EVAL
impaired balance/cognition/impaired coordination/decreased flexibility/impaired motor control/abnormal muscle tone/impaired postural control/decreased ROM/decreased sensation/decreased strength

## 2025-05-24 NOTE — OCCUPATIONAL THERAPY INITIAL EVALUATION ADULT - DIAGNOSIS, OT EVAL
74 year old Male admitted to [facility] for hematuria and underwent cystoscopy with clot evacuation c/b septic shock found to have bladder perforation, BL Pneumothorax, and Pneumomediastinum transferred to [facility] SICU for emergent ex-lap and anterior baldder repair 5/17/25 complicated Septic Shock, NSTEMI, and Acute on Chronc HF Exacerbation.

## 2025-05-24 NOTE — PROGRESS NOTE ADULT - NS ATTEND AMEND GEN_ALL_CORE FT
Agree w above.
As above  urine remains clear  maintain stout x 2 weeks, do  not change  s/p tube plugged, can be used for through and through irrigation if needed  Alex drainange noted  remainder of care per SICU team
Agree w above.
Has 3way stout and suprapubic cystotomy  No need for  CBI unless urine becomes bloody  open s/p tube for drainage if Stout is positional  maintain kristy drain to self suction and monitor output
Morris draining well  s/p tube capped  Alex drain in place, output noted  SICU care in progress
-    74M transferred from [facility] with Hx of CVA (on home plavix) with residual aphasia and R hemiparesis, HTN, HLD, DM2, CAD s/p PCI, initially admitted to [facility] with hematuria s/p cystoscopy with clot evacuation c/b bladder perforation, bilateral pneumothorax, and pneumomediastinum. Currently intubated/sedated, on IV vasopressin. Cardiology consulted due to elevated troponins.  #hsTnT elevated, however trend is flat, likely secondary to demand ischemia. Unable to obtain detailed history due to patient's mental status. EKG with no significant findings. TTE was nondiagnostic in the setting of PTX / pneumomediastinum. Request records from patient's outpatient cardiologist in AM. Can consider BIJAL if further cardiac workup is needed.    - Echo was nondiagnostic in the setting of PTX / pneumomediastinum.   -   d/w ICU team. ICU is awaiting for BIJAL.   -  will arrange for BIJAL.
74M transferred from [facility] with Hx of CVA (on home plavix) with residual aphasia and R hemiparesis, HTN, HLD, DM2, CAD s/p PCI, initially admitted to [facility] with hematuria s/p cystoscopy with clot evacuation c/b bladder perforation, bilateral pneumothorax, and pneumomediastinum. Currently intubated/sedated, on IV vasopressin. Cardiology consulted due to elevated troponins. hsTnT elevated, likely secondary to demand ischemia. Unable to obtain detailed history due to patient's mental status. EKG with no significant findings. TTE was nondiagnostic in the setting of PTX / pneumomediastinum. Request records from patient's outpatient cardiologist in AM. Can consider BIJAL if further cardiac workup is needed.      HFrEF  - TTE:  Technically very limited and difficult image quality nondiagnostic study due to pneumomediastinum air interferences, consider alternative imaging modality.  - BIJAL 5/20/25:  LVEF 45-50% (Newly reduced form baseline of 60%), apical anterior wall motion hypokinesis.    - presented for hypotension requiring vasopressor support  - GDMT as tolerated  - appears fluid volume overloaded on exam, can use lasix IVP as needed for fluid management  - still with some bradycardia overnight to 50s, but need for BP control and having frequent PVCs  - start metoprolol tartrate 12.5mg PO BID with hold parameters and assess tolerance      NSTEMI Type 2  - Per wife patient has known CAD and had a LHC in 2019 with Dr. Morales and has 2 blockages.  Per wife 100% blockage of dLCX x 2.    Patient is vented and critically ill on pressors, ROS unattainable.    - Troponin elevated at  66, 1287, 225, 261, and now 294, 224 - trending down  - BIJAL 5/20/25:   EF 45-50% (Newly reduced form 60%), apical anterior wall motion hypokinesis.   - Telemetry:  SR/SB. SB to 50bpm while sleeping  - plan for extubation today  - patient with several co-morbid conditions, will need f/u of OP record to assess need for inpatient ischemic eval this visit  - consider repeat TTE in a few days to assess if EF still reduced.  - eventual ischemia MAI (LHC) when medically optimized.
-      74M transferred from [facility] with Hx of CVA (on home plavix) with residual aphasia and R hemiparesis, HTN, HLD, DM2, CAD s/p PCI, initially admitted to [facility] with hematuria s/p cystoscopy with clot evacuation c/b bladder perforation, bilateral pneumothorax, and pneumomediastinum. Currently intubated/sedated, on IV vasopressin. Cardiology consulted due to elevated troponins. hsTnT elevated, likely secondary to demand ischemia. Unable to obtain detailed history due to patient's mental status. EKG with no significant findings. TTE was nondiagnostic in the setting of PTX / pneumomediastinum. Request records from patient's outpatient cardiologist in AM. Can consider BIJAL if further cardiac workup is needed.      HFrEF  - TTE:  Technically very limited and difficult image quality nondiagnostic study due to pneumomediastinum air interferences, consider alternative imaging modality.  - BIJAL 5/20/25:  LVEF 45-50% (Newly reduced form 60%), apical anterior wall motion hypokinesis.    - generalized edema noted.   - GDMT as tolerated  - unable to start BB as patient was bradycardiac overnight.     NSTEMI Type 2  - Per wife patient has known CAD and had a LHC in 2019 with Dr. Morales and has 2 blockages.  Per wife 100% blockage of dLCX x 2.    Patient is vented and critically ill on pressors, ROS unattainable.    - Troponin elevated at  66, 1287, 225, 261, and now 294, 224 - trending down  - BIJAL 5/20/25:   EF 45-50% (Newly reduced form 60%), apical anterior wall motion hypokinesis.    - EKG  Normal sinus rhythm  - Telemetry:  SR/SB.  Patient had episode of pauses of 2.8 seconds with initiation of propofol, no other episodes noted- continue telemetry.  - eventual ischemia MAI (LHC) when medically optimized.  - wife in attendance. d/w and updated.
As above  being weaned to extubate  cardiology input appreciated  maintain current drainage
As above  maintain foly/s/p/drain  SICU care
-    74M transferred from [facility] with Hx of CVA (on home plavix) with residual aphasia and R hemiparesis, HTN, HLD, DM2, CAD s/p PCI. Patient had been admitted for continued hematuria and underwent cysto with clot evacuation.   Patient was found to be hypotensive and required pressors.  Also, patient was found to have largeamount of free air and fluid in abdomen due to bladder perforation and bilateral pneumothorax and pneumomediastinum.   Patient had chest tube placed on Right side but was subsequently removed before transferred.  Patient then Admitted to SICU for resuscitation.  With CT findings of air and fluid filled abdomen and bilateral pneumothorax, assumed to be form bladder perforation after cysto and clot evac; patient underwent a complex cystorrhaphy for bladder rupture on 17-May-2025.   04:24:14/ Exploratory laparotomy 17-May-2025.    A cardiac consult for troponin leak and unable to see wall motion on TTE due to air.  Per wife patient has known CAD and had a LHC in 2019 with Dr. Morales and has 2 blockages.  Per wife 100% blockage of dLCX x 2.        HFrEF   - presented for hypotension requiring vasopressor support  - TTE performed nondiagnostic due to free air obstructing view  - now s/p BIJAL 5/20/25:   EF 45-50%, (Newly reduced from baseline of 60%), apical anterior wall motion hypokinesis.  - still with some bradycardia overnight to 50s, but need for BP control and having frequent PVCs  - recommend change metoprolol back to Coreg 25mg PO BID with hold parameters for further BP control    NSTEMI   -  Troponin 291->362->364.  - no s/s ACS, EKG NSR without ischemic changes  - LHC 5/13/2019: LM normal LAD normal, pCirc 70%, dCirc 100%, collatera ls from first marginal, mRCA 90%, rPLV 80%. Per OP notes, RCA lesion will need rotablation prior to multiple stents  - BIJAL 5/20/25:  EF 45-50% (Newly reduced from baseline 60%), apical anterior wall motion hypokinesis.    - consider repeat TTE in a few days to assess if EF still reduced  - case discussed with interventionalist Dr. Madrid, Interventional cardiology, no plan for LHC at this time given chronicity/high risk CAD, lack of symptoms and inability to tolerate DAPT  - troponin peak now downtrending, likely demand ischemia i/s/o surgery/sepsis/blood loss, although unable to fully r/o progressive CAD  - opt for medical management at this time, cont asa, statin, BB, ACEi.
-    74M transferred from [facility] with Hx of CVA (on home plavix) with residual aphasia and R hemiparesis, HTN, HLD, DM2, CAD s/p PCI. Patient had been admitted for continued hematuria and underwent cysto with clot evacuation.   Patient was found to be hypotensive and required pressors.  Also, patient was found to have largeamount of free air and fluid in abdomen due to bladder perforation and bilateral pneumothorax and pneumomediastinum.   Patient had chest tube placed on Right side but was subsequently removed before transferred.  Patient then Admitted to SICU for resuscitation.  With CT findings of air and fluid filled abdomen and bilateral pneumothorax, assumed to be form bladder perforation after cysto and clot evac; patient underwent a complex cystorrhaphy for bladder rupture on 17-May-2025.   04:24:14/ Exploratory laparotomy 17-May-2025.    A cardiac consult for troponin leak and unable to see wall motion on TTE due to air.  Per wife patient has known CAD and had a LHC in 2019 with Dr. Morales and has 2 blockages.  Per wife 100% blockage of dLCX x 2.         HFrEF  - presented for hypotension requiring vasopressor support  - TTE performed nondiagnostic due to free air obstructing view  - now s/p BIJAL 5/20/25:   EF 45-50%, (Newly reduced from baseline of 60%), apical anterior wall motion hypokinesis.  - still with some bradycardia overnight to 50s, but need for BP control and having frequent PVCs  - recommend change metoprolol back to Coreg 25mg PO BID with hold parameters for further BP control  - possibly with Afib on tele? continue to monitor, daily EKGS.     NSTEMI   -  Troponin rising again this visit, no s/s ACS, EKG NSR without ischemic changes  - [facility] 5/13/2019: LM normal LAD normal, pCirc 70%, dCirc 100%, collatera ls from first marginal, mRCA 90%, rPLV 80%. Per OP notes, RCA lesion will need rotablation prior to multiple stents  - BIJAL 5/20/25:   EF 45-50% (Newly reduced from baseline 60%), apical anterior wall motion hypokinesis.    - EKG  Normal sinus rhythm with frequent PVCs,  SR/SB. SB to 50bpm while sleeping  - patient with several co-morbid conditions  - consider repeat TTE in a few days to assess if EF still reduced  - Interventional cardiology consult to assess candidacy for LHC this admission.

## 2025-05-24 NOTE — PROGRESS NOTE ADULT - NS ATTEND OPT1 GEN_ALL_CORE
I attest my time as attending is greater than 50% of the total combined time spent on qualifying patient care activities by the PA/NP and attending.
I independently performed the documented:
I attest my time as attending is greater than 50% of the total combined time spent on qualifying patient care activities by the PA/NP and attending.
I independently performed the documented:

## 2025-05-24 NOTE — PROGRESS NOTE ADULT - ASSESSMENT
74M with PMHX CVA c/b residual aphasia/R hemiparesis on Plavix, CAD s/p PCI, HTN, HLD, DM2 admitted to [facility] for hematuria and underwent cystoscopy with clot evacuation c/b septic shock found to have bladder perforation, BL Pneumothorax, and Pneumomediastinum transferred to [facility] SICU for emergent ex-lap and anterior baldder repair 5/17/25 complicated Septic Shock, NSTEMI, and Acute on Chronc HF Exacerbation.    # Delirium/ sundowning  delirium precautions  Zyprexa 2.5 and melatonin in evening  QTc prolonged on Seroquel 12.5. d/matthew today      # s/p Acute on Chronic HFrEF Exac c/b NSTEMI  -TTE +TDS 2/2 PTX and Pneumomediastinum  -BIJAL +LVEF 45-50 with apical anterior segment RWMA  -EKG NSR 71bpm low voltage +prolonged QTc 530ms abnormal EKG  -ASA 81mg q24  -Metoprolol changed to Carvedilol 25mg BID per Cardio for PVCs  -Lisinopril 5mg q24 changed to losartan   -Atorvastatin 20mg qHS, ASA< BB. Family asking about Plavix restart  -Outpatient ischemic w/u, no plan for LHC during this hospitalization  -Conservative management at this time    #gen UE edema 2/2 third spacing  - educated arm elevation and exercises    #s/p BL PTX and Pneumomediastinum  -s/p intubation and extubation on RA  -O2 via NC post extubation titrate as able as necessary  -s/p Chest Tube placement at [facility] and removed PTA  -Avoid Positive Pressure if able. is on nocturnal CPAP here. Monitor for compliance  -CXR stable    #Septic Shock 2/2 Bladder Perforation -- resolved  -s/p emergent ex-lap and anterior bladder repair on 5/17/25  -Off vasopressors  -Meropenem completed 5/20   -Finasteride 5mg q24  -Maintain Morris, SPT, and JOSHUA Drain do not remove unless instructed by Urology  -Monitor UOP  -Urology following     # s/p DAYDAY    #DM2  -ISS + Admelog 5 units TID ACHS  -Lantus 20 units qHS    #Constipation  -Bowel Regimen  -Miralax 17g q24 + Senna 2 tab qHS    #Gout  -Allopurinol 100mg q24    #Hypothyroidism  -Levothyroxine 50mcg q24    VTE PPX: SCD/ LMWH 40mg q24    Dispo: clinically active, pending removal of JOSHUA drain    High Acuity and Spent at least 50 mins in evaluating, assessing and medical decision making in providing patient care separate from teaching.

## 2025-05-24 NOTE — OCCUPATIONAL THERAPY INITIAL EVALUATION ADULT - ADDITIONAL COMMENTS
Pt (+)aphasic, all info taken from spouse bedside. Pt lives with wife and adult son in private house, pt requires assistance for ADLs and transfers (able to perform stand-pivot to WC), owns a WC and wide quad cane, uses a RUE sling and right AFO. Pt is sponge bathed in bed, uses bedside commode. Pt was R handed but since stroke has become L handed.

## 2025-05-24 NOTE — OCCUPATIONAL THERAPY INITIAL EVALUATION ADULT - NSOTDISCHREC_GEN_A_CORE
Home with continued 24/7 A and home care OT if family willing & able to provide level of assist required, otherwise will require BRANDY.
no loss of consciousness, no gait abnormality, no headache, no sensory deficits, and no weakness.

## 2025-05-24 NOTE — OCCUPATIONAL THERAPY INITIAL EVALUATION ADULT - IMPAIRMENTS CONTRIBUTING IMPAIRED BED MOBILITY, REHAB EVAL
impaired balance/cognition/impaired coordination/decreased flexibility/impaired motor control/abnormal muscle tone/decreased ROM/decreased sensation/decreased strength

## 2025-05-25 LAB
ANION GAP SERPL CALC-SCNC: 12 MMOL/L — SIGNIFICANT CHANGE UP (ref 5–17)
BUN SERPL-MCNC: 12.2 MG/DL — SIGNIFICANT CHANGE UP (ref 8–20)
CALCIUM SERPL-MCNC: 7.7 MG/DL — LOW (ref 8.4–10.5)
CHLORIDE SERPL-SCNC: 104 MMOL/L — SIGNIFICANT CHANGE UP (ref 96–108)
CO2 SERPL-SCNC: 21 MMOL/L — LOW (ref 22–29)
CREAT SERPL-MCNC: 0.63 MG/DL — SIGNIFICANT CHANGE UP (ref 0.5–1.3)
EGFR: 99 ML/MIN/1.73M2 — SIGNIFICANT CHANGE UP
EGFR: 99 ML/MIN/1.73M2 — SIGNIFICANT CHANGE UP
GLUCOSE BLDC GLUCOMTR-MCNC: 211 MG/DL — HIGH (ref 70–99)
GLUCOSE BLDC GLUCOMTR-MCNC: 212 MG/DL — HIGH (ref 70–99)
GLUCOSE BLDC GLUCOMTR-MCNC: 239 MG/DL — HIGH (ref 70–99)
GLUCOSE BLDC GLUCOMTR-MCNC: 271 MG/DL — HIGH (ref 70–99)
GLUCOSE SERPL-MCNC: 211 MG/DL — HIGH (ref 70–99)
POTASSIUM SERPL-MCNC: 4.1 MMOL/L — SIGNIFICANT CHANGE UP (ref 3.5–5.3)
POTASSIUM SERPL-SCNC: 4.1 MMOL/L — SIGNIFICANT CHANGE UP (ref 3.5–5.3)
SODIUM SERPL-SCNC: 137 MMOL/L — SIGNIFICANT CHANGE UP (ref 135–145)

## 2025-05-25 PROCEDURE — 99232 SBSQ HOSP IP/OBS MODERATE 35: CPT

## 2025-05-25 RX ORDER — INSULIN GLARGINE-YFGN 100 [IU]/ML
25 INJECTION, SOLUTION SUBCUTANEOUS AT BEDTIME
Refills: 0 | Status: DISCONTINUED | OUTPATIENT
Start: 2025-05-25 | End: 2025-05-27

## 2025-05-25 RX ORDER — OLANZAPINE 10 MG/1
2.5 TABLET ORAL
Refills: 0 | Status: DISCONTINUED | OUTPATIENT
Start: 2025-05-25 | End: 2025-05-27

## 2025-05-25 RX ORDER — CLOPIDOGREL BISULFATE 75 MG/1
75 TABLET, FILM COATED ORAL DAILY
Refills: 0 | Status: DISCONTINUED | OUTPATIENT
Start: 2025-05-25 | End: 2025-05-27

## 2025-05-25 RX ORDER — INSULIN LISPRO 100 U/ML
6 INJECTION, SOLUTION INTRAVENOUS; SUBCUTANEOUS
Refills: 0 | Status: DISCONTINUED | OUTPATIENT
Start: 2025-05-25 | End: 2025-05-27

## 2025-05-25 RX ORDER — MELATONIN 5 MG
5 TABLET ORAL
Refills: 0 | Status: DISCONTINUED | OUTPATIENT
Start: 2025-05-25 | End: 2025-05-27

## 2025-05-25 RX ORDER — INSULIN LISPRO 100 U/ML
INJECTION, SOLUTION INTRAVENOUS; SUBCUTANEOUS
Refills: 0 | Status: DISCONTINUED | OUTPATIENT
Start: 2025-05-25 | End: 2025-05-27

## 2025-05-25 RX ORDER — LOSARTAN POTASSIUM 100 MG/1
100 TABLET, FILM COATED ORAL DAILY
Refills: 0 | Status: DISCONTINUED | OUTPATIENT
Start: 2025-05-25 | End: 2025-05-27

## 2025-05-25 RX ADMIN — CARVEDILOL 25 MILLIGRAM(S): 3.12 TABLET, FILM COATED ORAL at 05:42

## 2025-05-25 RX ADMIN — Medication 81 MILLIGRAM(S): at 11:53

## 2025-05-25 RX ADMIN — INSULIN LISPRO 6 UNIT(S): 100 INJECTION, SOLUTION INTRAVENOUS; SUBCUTANEOUS at 17:51

## 2025-05-25 RX ADMIN — INSULIN LISPRO 2: 100 INJECTION, SOLUTION INTRAVENOUS; SUBCUTANEOUS at 08:04

## 2025-05-25 RX ADMIN — Medication 975 MILLIGRAM(S): at 17:49

## 2025-05-25 RX ADMIN — INSULIN LISPRO 4: 100 INJECTION, SOLUTION INTRAVENOUS; SUBCUTANEOUS at 17:51

## 2025-05-25 RX ADMIN — FINASTERIDE 5 MILLIGRAM(S): 1 TABLET, FILM COATED ORAL at 11:52

## 2025-05-25 RX ADMIN — OLANZAPINE 2.5 MILLIGRAM(S): 10 TABLET ORAL at 17:50

## 2025-05-25 RX ADMIN — ATORVASTATIN CALCIUM 20 MILLIGRAM(S): 80 TABLET, FILM COATED ORAL at 21:44

## 2025-05-25 RX ADMIN — Medication 1 APPLICATION(S): at 11:53

## 2025-05-25 RX ADMIN — FUROSEMIDE 40 MILLIGRAM(S): 10 INJECTION INTRAMUSCULAR; INTRAVENOUS at 05:42

## 2025-05-25 RX ADMIN — INSULIN LISPRO 6: 100 INJECTION, SOLUTION INTRAVENOUS; SUBCUTANEOUS at 11:54

## 2025-05-25 RX ADMIN — Medication 975 MILLIGRAM(S): at 18:49

## 2025-05-25 RX ADMIN — INSULIN GLARGINE-YFGN 25 UNIT(S): 100 INJECTION, SOLUTION SUBCUTANEOUS at 21:43

## 2025-05-25 RX ADMIN — Medication 2 TABLET(S): at 21:44

## 2025-05-25 RX ADMIN — LOSARTAN POTASSIUM 25 MILLIGRAM(S): 100 TABLET, FILM COATED ORAL at 05:41

## 2025-05-25 RX ADMIN — INSULIN LISPRO 5 UNIT(S): 100 INJECTION, SOLUTION INTRAVENOUS; SUBCUTANEOUS at 08:03

## 2025-05-25 RX ADMIN — LOSARTAN POTASSIUM 100 MILLIGRAM(S): 100 TABLET, FILM COATED ORAL at 08:52

## 2025-05-25 RX ADMIN — ENOXAPARIN SODIUM 40 MILLIGRAM(S): 100 INJECTION SUBCUTANEOUS at 05:42

## 2025-05-25 RX ADMIN — CARVEDILOL 25 MILLIGRAM(S): 3.12 TABLET, FILM COATED ORAL at 17:50

## 2025-05-25 RX ADMIN — INSULIN LISPRO 6 UNIT(S): 100 INJECTION, SOLUTION INTRAVENOUS; SUBCUTANEOUS at 11:53

## 2025-05-25 RX ADMIN — POLYETHYLENE GLYCOL 3350 17 GRAM(S): 17 POWDER, FOR SOLUTION ORAL at 11:53

## 2025-05-25 RX ADMIN — Medication 100 MILLIGRAM(S): at 11:53

## 2025-05-25 RX ADMIN — Medication 5 MILLIGRAM(S): at 17:50

## 2025-05-25 RX ADMIN — Medication 50 MICROGRAM(S): at 05:41

## 2025-05-25 RX ADMIN — INSULIN LISPRO 4: 100 INJECTION, SOLUTION INTRAVENOUS; SUBCUTANEOUS at 21:42

## 2025-05-25 NOTE — PROGRESS NOTE ADULT - SUBJECTIVE AND OBJECTIVE BOX
Subjective:     Pt seen and examined at bedside during rounds with the team. No overnight events or new complaints.    Morris: Clear yellow urine    Vital Signs Last 24 Hrs  T(C): 36.6 (25 May 2025 08:40), Max: 36.8 (24 May 2025 16:48)  T(F): 97.8 (25 May 2025 08:40), Max: 98.3 (24 May 2025 16:48)  HR: 77 (25 May 2025 08:40) (67 - 99)  BP: 128/75 (25 May 2025 08:40) (128/75 - 181/72)  BP(mean): --  RR: 18 (25 May 2025 08:40) (18 - 18)  SpO2: 94% (25 May 2025 08:40) (93% - 95%)    Parameters below as of 25 May 2025 08:40  Patient On (Oxygen Delivery Method): nasal cannula      I&O's Detail    24 May 2025 07:01  -  25 May 2025 07:00  --------------------------------------------------------  IN:  Total IN: 0 mL    OUT:    Bulb (mL): 175 mL    Indwelling Catheter - Urethral (mL): 1700 mL    Voided (mL): 1500 mL  Total OUT: 3375 mL    Total NET: -3375 mL      25 May 2025 07:01  -  25 May 2025 11:26  --------------------------------------------------------  IN:  Total IN: 0 mL    OUT:    Bulb (mL): 35 mL  Total OUT: 35 mL    Total NET: -35 mL          Labs:                        9.4    8.18  )-----------( 182      ( 24 May 2025 05:15 )             30.2     05-25    137  |  104  |  12.2  ----------------------------<  211[H]  4.1   |  21.0[L]  |  0.63    Ca    7.7[L]      25 May 2025 06:50  Phos  2.5     05-24  Mg     1.6     05-24    TPro  4.7[L]  /  Alb  2.5[L]  /  TBili  0.4  /  DBili  x   /  AST  14  /  ALT  13  /  AlkPhos  82  05-24            Physical Exam  Constitutional: patient appears comfortable resting in bed, in no apparent distress  HEENT: head normocephalic and atraumatic, EOMI  Respiratory: respirations are unlabored, no accessory muscle use, no conversational dyspnea  Cardiovascular: regular rate & rhythm  Gastrointestinal: abdomen is soft & non-distended, non-tender, no rebound tenderness / guarding, drain with ss straw-colored output  : Morris with clear yellow urine in the tubing  Skin: mucous membranes moist, no diaphoresis, pallor, cyanosis or jaundice

## 2025-05-25 NOTE — PROGRESS NOTE ADULT - ASSESSMENT
74M with PMHX CVA c/b residual aphasia/R hemiparesis on Plavix, CAD s/p PCI, HTN, HLD, DM2 admitted to [place] for hematuria and underwent cystoscopy with clot evacuation c/b septic shock found to have bladder perforation, BL Pneumothorax, and Pneumomediastinum transferred to [facility] SICU for emergent ex-lap and anterior baldder repair 5/17/25 complicated Septic Shock, NSTEMI, and Acute on Chronc HF Exacerbation.    # Delirium/ sundowning  delirium precautions  Zyprexa 2.5 and melatonin in evening-- increased dose today  QTc prolonged on Seroquel 12.5. d/matthew 5/24    # s/p Acute on Chronic HFrEF Exac c/b NSTEMI  # uncontrolled HTN  -TTE +TDS 2/2 PTX and Pneumomediastinum  -BIJAL +LVEF 45-50 with apical anterior segment RWMA  -EKG NSR 71bpm low voltage +prolonged QTc 530ms abnormal EKG  -ASA 81mg q24  -Metoprolol changed to Carvedilol 25mg BID per Cardio for PVCs  -Lisinopril 5mg q24 changed to losartan ---- increased dose today  -Atorvastatin 20mg qHS, ASA< BB. Family asking about Plavix restart  -Outpatient ischemic w/u, no plan for LHC during this hospitalization  -Conservative management at this time    #gen UE edema 2/2 third spacing  - educated arm elevation and exercises    #s/p BL PTX and Pneumomediastinum  -s/p intubation and extubation on RA  -O2 via NC post extubation titrate as able as necessary  -s/p Chest Tube placement at [facility] and removed PTA  -Avoid Positive Pressure if able. is on nocturnal CPAP here. Monitor for compliance  -CXR stable    #Septic Shock 2/2 Bladder Perforation -- resolved  -s/p emergent ex-lap and anterior bladder repair on 5/17/25  -Off vasopressors  -Meropenem completed 5/20   -Finasteride 5mg q24  -Maintain Morris, SPT, and JOSHUA Drain do not remove unless instructed by Urology  -Monitor UOP  -Urology following     # s/p DAYDAY    #DM2 with hyperglycemia  -Insulin increased today    #Constipation  -Bowel Regimen  -Miralax 17g q24 + Senna 2 tab qHS  -offered enemas PRN.     #Gout  -Allopurinol 100mg q24    #Hypothyroidism  -Levothyroxine 50mcg q24    VTE PPX: SCD/ LMWH 40mg q24    Dispo: Home with PT    High Acuity and Spent at least 50 mins in evaluating, assessing and medical decision making in providing patient care separate from teaching.

## 2025-05-25 NOTE — PROGRESS NOTE ADULT - ASSESSMENT
74y Male with PMH CVA, CAD s/p PCI, HTN, HLD, DM, wheelchair-bound due to h/o stroke, h/o DVT and carotic stents, s/p cysto and clot evacuation on 5/16 followed by transfer from [facility] to [facility] with bladder perforation, b/l pneumothorax, pneumomediastinum, NSTEMI, fluid overload POD#8 s/p ex lap, anterior bladder wall repair, and SPT insertion. Now extubated, off pressors, downgraded to a regular medical floor.    Plan:  - Continue stout catheter, keep SPT capped  - Plan for d/c home with stout, SPT and drain  - OP f/u with Dr Bey  - Monitor stout and drain outputs  - Monitor labs and vitals  - Bowel regimen  - OOB  - Continue care as per primary team

## 2025-05-25 NOTE — PROGRESS NOTE ADULT - SUBJECTIVE AND OBJECTIVE BOX
HEALTH ISSUES - PROBLEM Dx:    CC- complicated hospital course    INTERVAL HPI/ OVERNIGHT EVENTS:    per spouse delirium overnight as well, except intermittent with sleep  asking to increase med dosages  otherwise no complaints  discussed glucose and BP findings    REVIEW OF SYSTEMS:    as above    Vital Signs Last 24 Hrs  T(C): 36.4 (25 May 2025 12:00), Max: 36.8 (24 May 2025 16:48)  T(F): 97.5 (25 May 2025 12:00), Max: 98.3 (24 May 2025 16:48)  HR: 83 (25 May 2025 12:00) (67 - 99)  BP: 143/81 (25 May 2025 12:00) (128/75 - 181/72)  BP(mean): --  RR: 18 (25 May 2025 12:00) (18 - 18)  SpO2: 94% (25 May 2025 12:00) (93% - 95%)    Parameters below as of 25 May 2025 12:00  Patient On (Oxygen Delivery Method): room air      PHYSICAL EXAM-  GENERAL: Lying in bed, no distress, [place]  HEAD:  Atraumatic, Normocephalic  EYES: EOMI, PERRLA, conjunctiva and sclera clear  ENMT: Moist mucous membranes, No lesions  NECK: Supple, No JVD, Normal thyroid  NERVOUS SYSTEM:  Alert & Oriented X 3, Moves spontaneously on left  CHEST/LUNG: Clear to auscultate bilaterally; No rales, rhonchi, wheezing, or rubs  HEART: Regular rate and rhythm; No murmurs, rubs, or gallops  ABDOMEN: Soft, Nontender, Nondistended; Bowel sounds present; SPC + capped, J drain in place, abd staples intact and clean, Morris +  EXTREMITIES:  2+ Peripheral Pulses, No clubbing, cyanosis; elliott UE edema  SKIN: No rashes or lesions    MEDICATIONS  (STANDING):  allopurinol 100 milliGRAM(s) Oral daily  aspirin  chewable 81 milliGRAM(s) Oral daily  atorvastatin 20 milliGRAM(s) Oral at bedtime  carvedilol 25 milliGRAM(s) Oral every 12 hours  chlorhexidine 2% Cloths 1 Application(s) Topical daily  enoxaparin Injectable 40 milliGRAM(s) SubCutaneous every 24 hours  finasteride 5 milliGRAM(s) Oral daily  furosemide    Tablet 40 milliGRAM(s) Oral daily  insulin glargine Injectable (LANTUS) 25 Unit(s) SubCutaneous at bedtime  insulin lispro (ADMELOG) corrective regimen sliding scale   SubCutaneous Before meals and at bedtime  insulin lispro Injectable (ADMELOG) 6 Unit(s) SubCutaneous three times a day before meals  levothyroxine 50 MICROGram(s) Oral daily  losartan 100 milliGRAM(s) Oral daily  melatonin 5 milliGRAM(s) Oral <User Schedule>  OLANZapine 2.5 milliGRAM(s) Oral <User Schedule>  polyethylene glycol 3350 17 Gram(s) Oral every 24 hours  senna 2 Tablet(s) Oral at bedtime    MEDICATIONS  (PRN):  acetaminophen     Tablet .. 975 milliGRAM(s) Oral every 6 hours PRN Mild Pain (1 - 3), Moderate Pain (4 - 6), Severe Pain (7 - 10)      LABS:                        9.4    8.18  )-----------( 182      ( 24 May 2025 05:15 )             30.2     05-25    137  |  104  |  12.2  ----------------------------<  211[H]  4.1   |  21.0[L]  |  0.63    Ca    7.7[L]      25 May 2025 06:50  Phos  2.5     05-24  Mg     1.6     05-24    TPro  4.7[L]  /  Alb  2.5[L]  /  TBili  0.4  /  DBili  x   /  AST  14  /  ALT  13  /  AlkPhos  82  05-24      Urinalysis Basic - ( 25 May 2025 06:50 )    Color: x / Appearance: x / SG: x / pH: x  Gluc: 211 mg/dL / Ketone: x  / Bili: x / Urobili: x   Blood: x / Protein: x / Nitrite: x   Leuk Esterase: x / RBC: x / WBC x   Sq Epi: x / Non Sq Epi: x / Bacteria: x      Xray Chest 1 View-PORTABLE IMMEDIATE:   ACC: [id number] EXAM:  XR CHEST PORTABLE IMMED 1V   ORDERED BY: EUGENE BAKER     PROCEDURE DATE:  05/20/2025          INTERPRETATION:  AP chest on May 20, 2025 at 4:01 PM. There is history of   pneumothorax.    Heart normal for projection.    Endotracheal tube and nasogastric tube remain.    There is a persistent mild left base effusion showing some improvement   from 819. No visible pneumothorax.    Small air collection lateral to the right rib cage is noted.    IMPRESSION: Left effusion improving. Small air collection lateral to the   right rib cage noted.    --- End of Report ---    CRISTINO ROBLES MD; Attending Radiologist  This document has been electronically signed. May 20 2025  4:35PM (05-20-25 @ 16:31)    CT scan

## 2025-05-25 NOTE — CHART NOTE - NSCHARTNOTEFT_GEN_A_CORE
Pt was found on his own device. Pt appears to be tolerating settings appropriately. Does not appear to be in any apparent respiratory distress at this time. HR 90 SpO2 97%

## 2025-05-26 LAB
GLUCOSE BLDC GLUCOMTR-MCNC: 196 MG/DL — HIGH (ref 70–99)
GLUCOSE BLDC GLUCOMTR-MCNC: 222 MG/DL — HIGH (ref 70–99)
GLUCOSE BLDC GLUCOMTR-MCNC: 271 MG/DL — HIGH (ref 70–99)
GLUCOSE BLDC GLUCOMTR-MCNC: 274 MG/DL — HIGH (ref 70–99)

## 2025-05-26 PROCEDURE — 99232 SBSQ HOSP IP/OBS MODERATE 35: CPT

## 2025-05-26 RX ADMIN — CARVEDILOL 25 MILLIGRAM(S): 3.12 TABLET, FILM COATED ORAL at 05:36

## 2025-05-26 RX ADMIN — CLOPIDOGREL BISULFATE 75 MILLIGRAM(S): 75 TABLET, FILM COATED ORAL at 13:04

## 2025-05-26 RX ADMIN — INSULIN LISPRO 6: 100 INJECTION, SOLUTION INTRAVENOUS; SUBCUTANEOUS at 13:03

## 2025-05-26 RX ADMIN — INSULIN LISPRO 2: 100 INJECTION, SOLUTION INTRAVENOUS; SUBCUTANEOUS at 08:48

## 2025-05-26 RX ADMIN — INSULIN LISPRO 6 UNIT(S): 100 INJECTION, SOLUTION INTRAVENOUS; SUBCUTANEOUS at 08:48

## 2025-05-26 RX ADMIN — INSULIN LISPRO 6: 100 INJECTION, SOLUTION INTRAVENOUS; SUBCUTANEOUS at 21:46

## 2025-05-26 RX ADMIN — ENOXAPARIN SODIUM 40 MILLIGRAM(S): 100 INJECTION SUBCUTANEOUS at 05:37

## 2025-05-26 RX ADMIN — OLANZAPINE 2.5 MILLIGRAM(S): 10 TABLET ORAL at 18:17

## 2025-05-26 RX ADMIN — FUROSEMIDE 40 MILLIGRAM(S): 10 INJECTION INTRAMUSCULAR; INTRAVENOUS at 05:36

## 2025-05-26 RX ADMIN — INSULIN GLARGINE-YFGN 25 UNIT(S): 100 INJECTION, SOLUTION SUBCUTANEOUS at 21:47

## 2025-05-26 RX ADMIN — Medication 81 MILLIGRAM(S): at 13:04

## 2025-05-26 RX ADMIN — LOSARTAN POTASSIUM 100 MILLIGRAM(S): 100 TABLET, FILM COATED ORAL at 05:36

## 2025-05-26 RX ADMIN — INSULIN LISPRO 4: 100 INJECTION, SOLUTION INTRAVENOUS; SUBCUTANEOUS at 18:17

## 2025-05-26 RX ADMIN — Medication 1 APPLICATION(S): at 13:08

## 2025-05-26 RX ADMIN — Medication 50 MICROGRAM(S): at 05:37

## 2025-05-26 RX ADMIN — Medication 5 MILLIGRAM(S): at 18:18

## 2025-05-26 RX ADMIN — INSULIN LISPRO 6 UNIT(S): 100 INJECTION, SOLUTION INTRAVENOUS; SUBCUTANEOUS at 13:02

## 2025-05-26 RX ADMIN — CARVEDILOL 25 MILLIGRAM(S): 3.12 TABLET, FILM COATED ORAL at 18:17

## 2025-05-26 RX ADMIN — INSULIN LISPRO 6 UNIT(S): 100 INJECTION, SOLUTION INTRAVENOUS; SUBCUTANEOUS at 18:16

## 2025-05-26 RX ADMIN — FINASTERIDE 5 MILLIGRAM(S): 1 TABLET, FILM COATED ORAL at 13:04

## 2025-05-26 RX ADMIN — Medication 100 MILLIGRAM(S): at 13:04

## 2025-05-26 RX ADMIN — ATORVASTATIN CALCIUM 20 MILLIGRAM(S): 80 TABLET, FILM COATED ORAL at 21:46

## 2025-05-26 NOTE — CHART NOTE - NSCHARTNOTEFT_GEN_A_CORE
Patient seen at bedside wearing their own CPAP machine. Patient resting comfortably in [place]. Patient tolerating well.

## 2025-05-26 NOTE — PROGRESS NOTE ADULT - SUBJECTIVE AND OBJECTIVE BOX
HEALTH ISSUES - PROBLEM Dx:    CC- complicated hospital course    INTERVAL HPI/ OVERNIGHT EVENTS:    appears bright and cheerful  fully conversant and alert today  per spouse he had good nights sleep last night      REVIEW OF SYSTEMS:    as above    Vital Signs Last 24 Hrs  T(C): 36.4 (26 May 2025 08:41), Max: 36.7 (25 May 2025 17:12)  T(F): 97.6 (26 May 2025 08:41), Max: 98 (25 May 2025 17:12)  HR: 75 (26 May 2025 08:41) (59 - 90)  BP: 126/75 (26 May 2025 08:41) (119/69 - 148/87)  BP(mean): --  RR: 18 (26 May 2025 08:41) (18 - 18)  SpO2: 95% (26 May 2025 08:41) (94% - 98%)    Parameters below as of 26 May 2025 08:00  Patient On (Oxygen Delivery Method): BiPAP/CPAP, nocturnal     PHYSICAL EXAM-  GENERAL:   HEAD:  Atraumatic, Normocephalic  EYES: EOMI, PERRLA, conjunctiva and sclera clear  ENT: Moist mucous membranes, No lesions  NECK: Supple, No JVD, Normal thyroid  NERVOUS SYSTEM:  Alert & Oriented X 3, Motor Strength 5/5 B/L upper and lower extremities  CHEST/LUNG: Clear to auscultate bilaterally; No rales, rhonchi, wheezing, or rubs  HEART: Regular rate and rhythm; No murmurs, rubs, or gallops  ABDOMEN: Soft, Nontender, Nondistended; Bowel sounds present  EXTREMITIES:  2+ Peripheral Pulses, No clubbing, cyanosis, or edema  SKIN: No rashes or lesions    MEDICATIONS  (STANDING):  allopurinol 100 milliGRAM(s) Oral daily  aspirin  chewable 81 milliGRAM(s) Oral daily  atorvastatin 20 milliGRAM(s) Oral at bedtime  carvedilol 25 milliGRAM(s) Oral every 12 hours  chlorhexidine 2% Cloths 1 Application(s) Topical daily  clopidogrel Tablet 75 milliGRAM(s) Oral daily  enoxaparin Injectable 40 milliGRAM(s) SubCutaneous every 24 hours  finasteride 5 milliGRAM(s) Oral daily  furosemide    Tablet 40 milliGRAM(s) Oral daily  insulin glargine Injectable (LANTUS) 25 Unit(s) SubCutaneous at bedtime  insulin lispro (ADMELOG) corrective regimen sliding scale   SubCutaneous Before meals and at bedtime  insulin lispro Injectable (ADMELOG) 6 Unit(s) SubCutaneous three times a day before meals  levothyroxine 50 MICROGram(s) Oral daily  losartan 100 milliGRAM(s) Oral daily  melatonin 5 milliGRAM(s) Oral <User Schedule>  OLANZapine 2.5 milliGRAM(s) Oral <User Schedule>  polyethylene glycol 3350 17 Gram(s) Oral every 24 hours  senna 2 Tablet(s) Oral at bedtime    MEDICATIONS  (PRN):  acetaminophen     Tablet .. 975 milliGRAM(s) Oral every 6 hours PRN Mild Pain (1 - 3), Moderate Pain (4 - 6), Severe Pain (7 - 10)      LABS:    05-25    137  |  104  |  12.2  ----------------------------<  211[H]  4.1   |  21.0[L]  |  0.63    Ca    7.7[L]      25 May 2025 06:50        Urinalysis Basic - ( 25 May 2025 06:50 )    Color: x / Appearance: x / SG: x / pH: x  Gluc: 211 mg/dL / Ketone: x  / Bili: x / Urobili: x   Blood: x / Protein: x / Nitrite: x   Leuk Esterase: x / RBC: x / WBC x   Sq Epi: x / Non Sq Epi: x / Bacteria: x      Xray Chest 1 View-PORTABLE IMMEDIATE:   ACC: [id number] EXAM:  XR CHEST PORTABLE IMMED 1V   ORDERED BY: EUGENE BAKER     PROCEDURE DATE:  05/20/2025          INTERPRETATION:  AP chest on May 20, 2025 at 4:01 PM. There is history of   pneumothorax.    Heart normal for projection.    Endotracheal tube and nasogastric tube remain.    There is a persistent mild left base effusion showing some improvement   from 819. No visible pneumothorax.    Small air collection lateral to the right rib cage is noted.    IMPRESSION: Left effusion improving. Small air collection lateral to the   right rib cage noted.    --- End of Report ---      CRISTINO ROBLES MD; Attending Radiologist  This document has been electronically signed. May 20 2025  4:35PM (05-20-25 @ 16:31)

## 2025-05-26 NOTE — PROGRESS NOTE ADULT - ASSESSMENT
74M with PMHX CVA c/b residual aphasia/R hemiparesis on Plavix, CAD s/p PCI, HTN, HLD, DM2 admitted to [place] for hematuria and underwent cystoscopy with clot evacuation c/b septic shock found to have bladder perforation, BL Pneumothorax, and Pneumomediastinum transferred to [facility] SICU for emergent ex-lap and anterior baldder repair 5/17/25 complicated Septic Shock, NSTEMI, and Acute on Chronc HF Exacerbation.    # Delirium/ sundowning-- improving  delirium precautions -- d/w nursing to implement  Zyprexa 2.5 and melatonin in evening   QTc prolonged on Seroquel 12.5. d/matthew 5/24    #Septic Shock 2/2 Bladder Perforation -- resolved  -s/p emergent ex-lap and anterior bladder repair on 5/17/25  -Off vasopressors  -Meropenem completed 5/20   -Finasteride 5mg q24  -Maintain Morris, SPT, and JOSHUA Drain do not remove unless instructed by Urology  -Monitor UOP. JOSHUA drain output has been 100-->150-->175-->95. Urology- to check JOSHUA drain fluid Crt to assess if there is a bladder leak.   -Urology following     # s/p Acute on Chronic HFrEF Exac c/b NSTEMI  # uncontrolled HTN  -TTE +TDS 2/2 PTX and Pneumomediastinum  -BIJAL +LVEF 45-50 with apical anterior segment RWMA  -EKG NSR 71bpm low voltage +prolonged QTc 530ms abnormal EKG  -ASA 81mg q24  -Metoprolol changed to Carvedilol 25mg BID per Cardio for PVCs  -Lisinopril 5mg q24 changed to losartan ---- increased dose 5/25/25  -Atorvastatin 20mg qHS, ASA< BB. Family asking about Plavix restart  -Outpatient ischemic w/u, no plan for LHC during this hospitalization  -Conservative management at this time    #gen UE edema 2/2 third spacing  - educated arm elevation and exercises    #s/p BL PTX and Pneumomediastinum  -s/p intubation and extubation on RA  -O2 via NC post extubation titrate as able as necessary  -s/p Chest Tube placement at [facility] and removed PTA  -Avoid Positive Pressure if able. Is on nocturnal CPAP here. Monitor for compliance  -CXR stable    # s/p DAYDAY    #DM2 with hyperglycemia  -Insulin increased today    #Constipation  -Bowel Regimen  -Miralax 17g q24 + Senna 2 tab qHS  -offered enemas PRN.     #Gout  -Allopurinol 100mg q24    #Hypothyroidism  -Levothyroxine 50mcg q24    VTE PPX: SCD/ LMWH 40mg q24    Dispo: Home with PT    High Acuity and Spent at least 50 mins in evaluating, assessing and medical decision making in providing patient care separate from teaching.

## 2025-05-26 NOTE — CHART NOTE - NSCHARTNOTESELECT_GEN_ALL_CORE
Event Note
Nutrition Services
SICU Downgrade/Event Note
Event Note
Pts Own CPAP/Event Note
interventional cardiology brief note/Event Note
Event Note

## 2025-05-26 NOTE — PROGRESS NOTE ADULT - SUBJECTIVE AND OBJECTIVE BOX
Subjective: 74y Male seen at bedside. Pt resting comfortably. No events overnight.     MEDICATIONS  (STANDING):  allopurinol 100 milliGRAM(s) Oral daily  aspirin  chewable 81 milliGRAM(s) Oral daily  atorvastatin 20 milliGRAM(s) Oral at bedtime  carvedilol 25 milliGRAM(s) Oral every 12 hours  chlorhexidine 2% Cloths 1 Application(s) Topical daily  clopidogrel Tablet 75 milliGRAM(s) Oral daily  enoxaparin Injectable 40 milliGRAM(s) SubCutaneous every 24 hours  finasteride 5 milliGRAM(s) Oral daily  furosemide    Tablet 40 milliGRAM(s) Oral daily  insulin glargine Injectable (LANTUS) 25 Unit(s) SubCutaneous at bedtime  insulin lispro (ADMELOG) corrective regimen sliding scale   SubCutaneous Before meals and at bedtime  insulin lispro Injectable (ADMELOG) 6 Unit(s) SubCutaneous three times a day before meals  levothyroxine 50 MICROGram(s) Oral daily  losartan 100 milliGRAM(s) Oral daily  melatonin 5 milliGRAM(s) Oral <User Schedule>  OLANZapine 2.5 milliGRAM(s) Oral <User Schedule>  polyethylene glycol 3350 17 Gram(s) Oral every 24 hours  senna 2 Tablet(s) Oral at bedtime    MEDICATIONS  (PRN):  acetaminophen     Tablet .. 975 milliGRAM(s) Oral every 6 hours PRN Mild Pain (1 - 3), Moderate Pain (4 - 6), Severe Pain (7 - 10)      Vital Signs Last 24 Hrs  T(C): 36.4 (26 May 2025 08:41), Max: 36.7 (25 May 2025 17:12)  T(F): 97.6 (26 May 2025 08:41), Max: 98 (25 May 2025 17:12)  HR: 75 (26 May 2025 08:41) (59 - 90)  BP: 126/75 (26 May 2025 08:41) (119/69 - 148/87)  BP(mean): --  RR: 18 (26 May 2025 08:41) (18 - 18)  SpO2: 95% (26 May 2025 08:41) (94% - 98%)    Parameters below as of 26 May 2025 05:34  Patient On (Oxygen Delivery Method): BiPAP/CPAP          05-25 - 05-26  --------------------------------------------------------  IN:    Oral Fluid: 600 mL  Total IN: 600 mL    OUT:    Bulb (mL): 95 mL    Indwelling Catheter - Urethral (mL): 1650 mL    Voided (mL): 1600 mL  Total OUT: 3345 mL    Total NET: -2745 mL      05-26  -  05-26  --------------------------------------------------------  IN:  Total IN: 0 mL    OUT:    Bulb (mL): 50 mL    Voided (mL): 1000 mL  Total OUT: 1050 mL    Total NET: -1050 mL          PHYSICAL EXAM   General: NAD  Respiratory: Respirations non-labored; no accessory muscle use  ABD: Soft, non-tender, non-distended. JOSHUA draining serous fluid. Midline incision intact.   : Stout catheter draining clear, yellow urine. SP tube capped.   Skin: Warm, dry. No cyanosis     LABS:    05-25    137  |  104  |  12.2  ----------------------------<  211[H]  4.1   |  21.0[L]  |  0.63    Ca    7.7[L]      25 May 2025 06:50        Urinalysis Basic - ( 25 May 2025 06:50 )    Color: x / Appearance: x / SG: x / pH: x  Gluc: 211 mg/dL / Ketone: x  / Bili: x / Urobili: x   Blood: x / Protein: x / Nitrite: x   Leuk Esterase: x / RBC: x / WBC x   Sq Epi: x / Non Sq Epi: x / Bacteria: x          ASSESSMENT:  74y Male with PMH CVA, CAD s/p PCI, HTN, HLD, DM, wheelchair-bound due to h/o stroke, h/o DVT and carotic stents, s/p cysto and clot evacuation on 5/16 followed by transfer from [facility] to [facility] with bladder perforation, b/l pneumothorax, pneumomediastinum, NSTEMI, fluid overload POD#8 s/p ex lap, anterior bladder wall repair, and SPT insertion. Now extubated, off pressors, downgraded to a regular medical floor.      PLAN:  Maintain stout catheter   Maintain SPT, please keep capped  F/u JOSHUA cr  D/c with stout, drain, and SPT  F/u with Dr. Bey outpt  Monitor labs and vitals  Bowel regimen  DVT ppx: Lovenox   Continue care as per primary team

## 2025-05-27 ENCOUNTER — TRANSCRIPTION ENCOUNTER (OUTPATIENT)
Age: 75
End: 2025-05-27

## 2025-05-27 VITALS
RESPIRATION RATE: 18 BRPM | DIASTOLIC BLOOD PRESSURE: 56 MMHG | SYSTOLIC BLOOD PRESSURE: 156 MMHG | TEMPERATURE: 98 F | OXYGEN SATURATION: 96 % | HEART RATE: 66 BPM

## 2025-05-27 LAB
ANION GAP SERPL CALC-SCNC: 10 MMOL/L — SIGNIFICANT CHANGE UP (ref 5–17)
BUN SERPL-MCNC: 13.4 MG/DL — SIGNIFICANT CHANGE UP (ref 8–20)
CALCIUM SERPL-MCNC: 7.6 MG/DL — LOW (ref 8.4–10.5)
CHLORIDE SERPL-SCNC: 107 MMOL/L — SIGNIFICANT CHANGE UP (ref 96–108)
CO2 SERPL-SCNC: 26 MMOL/L — SIGNIFICANT CHANGE UP (ref 22–29)
CREAT FLD-MCNC: 0.87 MG/DL — SIGNIFICANT CHANGE UP
CREAT SERPL-MCNC: 0.69 MG/DL — SIGNIFICANT CHANGE UP (ref 0.5–1.3)
EGFR: 97 ML/MIN/1.73M2 — SIGNIFICANT CHANGE UP
EGFR: 97 ML/MIN/1.73M2 — SIGNIFICANT CHANGE UP
GLUCOSE BLDC GLUCOMTR-MCNC: 225 MG/DL — HIGH (ref 70–99)
GLUCOSE BLDC GLUCOMTR-MCNC: 266 MG/DL — HIGH (ref 70–99)
GLUCOSE BLDC GLUCOMTR-MCNC: 298 MG/DL — HIGH (ref 70–99)
GLUCOSE BLDC GLUCOMTR-MCNC: 306 MG/DL — HIGH (ref 70–99)
GLUCOSE SERPL-MCNC: 200 MG/DL — HIGH (ref 70–99)
HCT VFR BLD CALC: 31.1 % — LOW (ref 39–50)
HGB BLD-MCNC: 9.7 G/DL — LOW (ref 13–17)
MCHC RBC-ENTMCNC: 28.5 PG — SIGNIFICANT CHANGE UP (ref 27–34)
MCHC RBC-ENTMCNC: 31.2 G/DL — LOW (ref 32–36)
MCV RBC AUTO: 91.5 FL — SIGNIFICANT CHANGE UP (ref 80–100)
NRBC # BLD AUTO: 0 K/UL — SIGNIFICANT CHANGE UP (ref 0–0)
NRBC # FLD: 0 K/UL — SIGNIFICANT CHANGE UP (ref 0–0)
NRBC BLD AUTO-RTO: 0 /100 WBCS — SIGNIFICANT CHANGE UP (ref 0–0)
PLATELET # BLD AUTO: 246 K/UL — SIGNIFICANT CHANGE UP (ref 150–400)
PMV BLD: 12.3 FL — SIGNIFICANT CHANGE UP (ref 7–13)
POTASSIUM SERPL-MCNC: 3.6 MMOL/L — SIGNIFICANT CHANGE UP (ref 3.5–5.3)
POTASSIUM SERPL-SCNC: 3.6 MMOL/L — SIGNIFICANT CHANGE UP (ref 3.5–5.3)
RBC # BLD: 3.4 M/UL — LOW (ref 4.2–5.8)
RBC # FLD: 16.7 % — HIGH (ref 10.3–14.5)
SODIUM SERPL-SCNC: 142 MMOL/L — SIGNIFICANT CHANGE UP (ref 135–145)
WBC # BLD: 7.52 K/UL — SIGNIFICANT CHANGE UP (ref 3.8–10.5)
WBC # FLD AUTO: 7.52 K/UL — SIGNIFICANT CHANGE UP (ref 3.8–10.5)

## 2025-05-27 PROCEDURE — 99239 HOSP IP/OBS DSCHRG MGMT >30: CPT

## 2025-05-27 RX ORDER — CLOPIDOGREL BISULFATE 75 MG/1
1 TABLET, FILM COATED ORAL
Qty: 0 | Refills: 0 | DISCHARGE
Start: 2025-05-27

## 2025-05-27 RX ORDER — OLANZAPINE 10 MG/1
1 TABLET ORAL
Qty: 30 | Refills: 0
Start: 2025-05-27 | End: 2025-06-25

## 2025-05-27 RX ORDER — CLOPIDOGREL BISULFATE 75 MG/1
1 TABLET, FILM COATED ORAL
Qty: 30 | Refills: 0
Start: 2025-05-27 | End: 2025-06-25

## 2025-05-27 RX ORDER — CARVEDILOL 3.12 MG/1
1 TABLET, FILM COATED ORAL
Qty: 0 | Refills: 0 | DISCHARGE
Start: 2025-05-27

## 2025-05-27 RX ORDER — LOSARTAN POTASSIUM 100 MG/1
1 TABLET, FILM COATED ORAL
Qty: 0 | Refills: 0 | DISCHARGE
Start: 2025-05-27

## 2025-05-27 RX ORDER — ASPIRIN 325 MG
1 TABLET ORAL
Qty: 30 | Refills: 0
Start: 2025-05-27 | End: 2025-06-25

## 2025-05-27 RX ORDER — FUROSEMIDE 10 MG/ML
1 INJECTION INTRAMUSCULAR; INTRAVENOUS
Qty: 30 | Refills: 0
Start: 2025-05-27 | End: 2025-06-25

## 2025-05-27 RX ORDER — ASPIRIN 325 MG
1 TABLET ORAL
Qty: 0 | Refills: 0 | DISCHARGE
Start: 2025-05-27

## 2025-05-27 RX ORDER — LOSARTAN POTASSIUM 100 MG/1
1 TABLET, FILM COATED ORAL
Qty: 30 | Refills: 0
Start: 2025-05-27 | End: 2025-06-25

## 2025-05-27 RX ORDER — FINASTERIDE 1 MG/1
1 TABLET, FILM COATED ORAL
Qty: 0 | Refills: 0 | DISCHARGE
Start: 2025-05-27

## 2025-05-27 RX ORDER — LEVOTHYROXINE SODIUM 300 MCG
1 TABLET ORAL
Qty: 0 | Refills: 0 | DISCHARGE
Start: 2025-05-27

## 2025-05-27 RX ORDER — LEVOTHYROXINE SODIUM 300 MCG
1 TABLET ORAL
Qty: 30 | Refills: 0
Start: 2025-05-27 | End: 2025-06-25

## 2025-05-27 RX ORDER — CARVEDILOL 3.12 MG/1
1 TABLET, FILM COATED ORAL
Qty: 60 | Refills: 0
Start: 2025-05-27 | End: 2025-06-25

## 2025-05-27 RX ORDER — FINASTERIDE 1 MG/1
1 TABLET, FILM COATED ORAL
Qty: 30 | Refills: 0
Start: 2025-05-27 | End: 2025-06-25

## 2025-05-27 RX ADMIN — FUROSEMIDE 40 MILLIGRAM(S): 10 INJECTION INTRAMUSCULAR; INTRAVENOUS at 06:27

## 2025-05-27 RX ADMIN — Medication 5 MILLIGRAM(S): at 17:39

## 2025-05-27 RX ADMIN — INSULIN LISPRO 6 UNIT(S): 100 INJECTION, SOLUTION INTRAVENOUS; SUBCUTANEOUS at 12:55

## 2025-05-27 RX ADMIN — ENOXAPARIN SODIUM 40 MILLIGRAM(S): 100 INJECTION SUBCUTANEOUS at 06:27

## 2025-05-27 RX ADMIN — CARVEDILOL 25 MILLIGRAM(S): 3.12 TABLET, FILM COATED ORAL at 17:39

## 2025-05-27 RX ADMIN — Medication 1 APPLICATION(S): at 12:46

## 2025-05-27 RX ADMIN — INSULIN LISPRO 6 UNIT(S): 100 INJECTION, SOLUTION INTRAVENOUS; SUBCUTANEOUS at 08:17

## 2025-05-27 RX ADMIN — Medication 50 MICROGRAM(S): at 06:27

## 2025-05-27 RX ADMIN — INSULIN LISPRO 6 UNIT(S): 100 INJECTION, SOLUTION INTRAVENOUS; SUBCUTANEOUS at 18:41

## 2025-05-27 RX ADMIN — INSULIN LISPRO 8: 100 INJECTION, SOLUTION INTRAVENOUS; SUBCUTANEOUS at 18:42

## 2025-05-27 RX ADMIN — OLANZAPINE 2.5 MILLIGRAM(S): 10 TABLET ORAL at 17:39

## 2025-05-27 RX ADMIN — CARVEDILOL 25 MILLIGRAM(S): 3.12 TABLET, FILM COATED ORAL at 06:27

## 2025-05-27 RX ADMIN — LOSARTAN POTASSIUM 100 MILLIGRAM(S): 100 TABLET, FILM COATED ORAL at 06:27

## 2025-05-27 RX ADMIN — INSULIN LISPRO 6: 100 INJECTION, SOLUTION INTRAVENOUS; SUBCUTANEOUS at 12:56

## 2025-05-27 RX ADMIN — FINASTERIDE 5 MILLIGRAM(S): 1 TABLET, FILM COATED ORAL at 12:41

## 2025-05-27 RX ADMIN — POLYETHYLENE GLYCOL 3350 17 GRAM(S): 17 POWDER, FOR SOLUTION ORAL at 12:41

## 2025-05-27 RX ADMIN — CLOPIDOGREL BISULFATE 75 MILLIGRAM(S): 75 TABLET, FILM COATED ORAL at 12:40

## 2025-05-27 RX ADMIN — INSULIN LISPRO 4: 100 INJECTION, SOLUTION INTRAVENOUS; SUBCUTANEOUS at 08:18

## 2025-05-27 RX ADMIN — Medication 100 MILLIGRAM(S): at 12:41

## 2025-05-27 RX ADMIN — Medication 81 MILLIGRAM(S): at 12:41

## 2025-05-27 NOTE — DISCHARGE NOTE NURSING/CASE MANAGEMENT/SOCIAL WORK - NSDCPEFALRISK_GEN_ALL_CORE
For information on Fall & Injury Prevention, visit: [email or web address] OR  [email or web address] OR  https://www.cdc.gov/steadi/patient.html

## 2025-05-27 NOTE — DISCHARGE NOTE PROVIDER - NSDCMRMEDTOKEN_GEN_ALL_CORE_FT
allopurinol 100 mg oral tablet: 1 tab(s) orally once a day  aspirin 81 mg oral tablet, chewable: 1 tab(s) orally once a day  carvedilol 25 mg oral tablet: 1 tab(s) orally every 12 hours  clopidogrel 75 mg oral tablet: 1 tab(s) orally once a day  finasteride 5 mg oral tablet: 1 tab(s) orally once a day  furosemide 40 mg oral tablet: 1 tab(s) orally once a day  levothyroxine 50 mcg (0.05 mg) oral tablet: 1 tab(s) orally once a day  losartan 100 mg oral tablet: 1 tab(s) orally once a day  MetFORMIN (Eqv-Glucophage XR) 500 mg oral tablet, extended release: 1 tab(s) orally once a day  oxyBUTYnin 5 mg oral tablet: 1 tab(s) orally 2 times a day as needed for Bladder spasms MDD: 2 tabs  polyethylene glycol 3350 oral powder for reconstitution: 17 gram(s) orally once a day  simvastatin 40 mg oral tablet: 1 tab(s) orally once a day (at bedtime)  ZyPREXA 2.5 mg oral tablet: 1 tab(s) orally once a day (at bedtime)

## 2025-05-27 NOTE — DISCHARGE NOTE NURSING/CASE MANAGEMENT/SOCIAL WORK - PATIENT PORTAL LINK FT
You can access the FollowMyHealth Patient Portal offered by [facility] by registering at the following website: [email or web address]. By joining [employer]’s FollowMyHealth portal, you will also be able to view your health information using other applications (apps) compatible with our system.

## 2025-05-27 NOTE — DISCHARGE NOTE NURSING/CASE MANAGEMENT/SOCIAL WORK - NSDCPEPT PROEDHF_GEN_ALL_CORE
Call primary care provider for follow up after discharge/Activities as tolerated/Low salt diet/Monitor weight daily/Report signs and symptoms to primary care provider
Dr. Vann

## 2025-05-27 NOTE — DISCHARGE NOTE PROVIDER - ATTENDING DISCHARGE PHYSICAL EXAMINATION:
VITAL SIGNS:  T 97.9 F  /78  HR 72  RR 18  SpO2 95%    PHYSICAL EXAM:  GENERAL: NAD, laying comfortably in bed  HEAD:  Atraumatic, Normocephalic  EYES: EOMI, PERRLA, conjunctiva and sclera clear  ENT: Moist mucous membranes, No lesions  NECK: Supple, No JVD, Normal thyroid  NERVOUS SYSTEM:  Alert & Oriented X 3, Motor Strength 5/5 B/L upper and lower extremities  CHEST/LUNG: Clear to auscultate bilaterally; No rales, rhonchi, wheezing, or rubs  HEART: Regular rate and rhythm; No murmurs, rubs, or gallops  ABDOMEN: Soft, Nontender, Nondistended; Bowel sounds present  EXTREMITIES:  2+ Peripheral Pulses, No clubbing, cyanosis, or edema  SKIN: No rashes or lesions   VITAL SIGNS:  T 97.9 F  /78  HR 72  RR 18  SpO2 95%    PHYSICAL EXAM:  GENERAL: Comfortable, laying comfortably in bed  HEAD:  Atraumatic, Normocephalic  EYES: EOMI, PERRLA, conjunctiva and sclera clear  ENT: Moist mucous membranes, No lesions  NECK: Supple, No JVD, Normal thyroid  NERVOUS SYSTEM:  Alert & Oriented X 3, Motor Strength 5/5 B/L upper and lower extremities  CHEST/LUNG: Clear to auscultate bilaterally; No rales, rhonchi, wheezing, or rubs  HEART: Regular rate and rhythm; No murmurs, rubs, or gallops  ABDOMEN: Soft, Nontender, Nondistended; Bowel sounds present; with stout/ J tube/ SPC that is capped/ and abd wound staples +  EXTREMITIES:  2+ Peripheral Pulses, No clubbing, cyanosis, or edema  SKIN: No rashes or lesions

## 2025-05-27 NOTE — DISCHARGE NOTE PROVIDER - CARE PROVIDERS DIRECT ADDRESSES
[email or web address]

## 2025-05-27 NOTE — PROGRESS NOTE ADULT - SUBJECTIVE AND OBJECTIVE BOX
Subjective: 74y Male seen at bedside. Pt resting comfortably. No events overnight.     MEDICATIONS  (STANDING):  allopurinol 100 milliGRAM(s) Oral daily  aspirin  chewable 81 milliGRAM(s) Oral daily  atorvastatin 20 milliGRAM(s) Oral at bedtime  carvedilol 25 milliGRAM(s) Oral every 12 hours  chlorhexidine 2% Cloths 1 Application(s) Topical daily  clopidogrel Tablet 75 milliGRAM(s) Oral daily  enoxaparin Injectable 40 milliGRAM(s) SubCutaneous every 24 hours  finasteride 5 milliGRAM(s) Oral daily  furosemide    Tablet 40 milliGRAM(s) Oral daily  insulin glargine Injectable (LANTUS) 25 Unit(s) SubCutaneous at bedtime  insulin lispro (ADMELOG) corrective regimen sliding scale   SubCutaneous Before meals and at bedtime  insulin lispro Injectable (ADMELOG) 6 Unit(s) SubCutaneous three times a day before meals  levothyroxine 50 MICROGram(s) Oral daily  losartan 100 milliGRAM(s) Oral daily  melatonin 5 milliGRAM(s) Oral <User Schedule>  OLANZapine 2.5 milliGRAM(s) Oral <User Schedule>  polyethylene glycol 3350 17 Gram(s) Oral every 24 hours  senna 2 Tablet(s) Oral at bedtime    MEDICATIONS  (PRN):  acetaminophen     Tablet .. 975 milliGRAM(s) Oral every 6 hours PRN Mild Pain (1 - 3), Moderate Pain (4 - 6), Severe Pain (7 - 10)      Vital Signs Last 24 Hrs  T(C): 36.6 (27 May 2025 08:53), Max: 37.1 (26 May 2025 18:05)  T(F): 97.9 (27 May 2025 08:53), Max: 98.7 (26 May 2025 18:05)  HR: 72 (27 May 2025 08:53) (64 - 79)  BP: 132/78 (27 May 2025 08:53) (129/66 - 176/79)  BP(mean): --  RR: 18 (27 May 2025 08:53) (18 - 18)  SpO2: 95% (27 May 2025 08:53) (95% - 96%)    Parameters below as of 27 May 2025 08:53  Patient On (Oxygen Delivery Method): room air          05-26 - 05-27  --------------------------------------------------------  IN:  Total IN: 0 mL    OUT:    Bulb (mL): 75 mL    Voided (mL): 2100 mL  Total OUT: 2175 mL    Total NET: -2175 mL          PHYSICAL EXAM   General: NAD  Respiratory: Respirations non-labored; no accessory muscle use  ABD: Soft, non-tender, non-distended. JOSHUA draining yellow tinged fluid. Midline incision intact.   : Stout catheter draining clear, yellow urine. SP tube capped.   Skin: Warm, dry. No cyanosis     LABS:                        9.7    7.52  )-----------( 246      ( 27 May 2025 06:39 )             31.1     05-27    142  |  107  |  13.4  ----------------------------<  200[H]  3.6   |  26.0  |  0.69    Ca    7.6[L]      27 May 2025 06:39        Urinalysis Basic - ( 27 May 2025 06:39 )    Color: x / Appearance: x / SG: x / pH: x  Gluc: 200 mg/dL / Ketone: x  / Bili: x / Urobili: x   Blood: x / Protein: x / Nitrite: x   Leuk Esterase: x / RBC: x / WBC x   Sq Epi: x / Non Sq Epi: x / Bacteria: x      ASSESSMENT: 74y Male with PMH CVA, CAD s/p PCI, HTN, HLD, DM, wheelchair-bound due to h/o stroke, h/o DVT and carotic stents, s/p cysto and clot evacuation on 5/16 followed by transfer from [facility] to [facility] with bladder perforation, b/l pneumothorax, pneumomediastinum, NSTEMI, fluid overload POD#8 s/p ex lap, anterior bladder wall repair, and SPT insertion. Now extubated, off pressors, downgraded to a regular medical floor.    PLAN:  Maintain stout catheter   Maintain SPT, please keep capped  F/u JOSHUA cr  D/c with stout, drain, and SPT  F/u with Dr. Bey outpt  Monitor labs and vitals  Bowel regimen  DVT ppx: Lovenox   Continue care as per primary team  Subjective: 74y Male seen at bedside. Pt resting comfortably. No events overnight.     MEDICATIONS  (STANDING):  allopurinol 100 milliGRAM(s) Oral daily  aspirin  chewable 81 milliGRAM(s) Oral daily  atorvastatin 20 milliGRAM(s) Oral at bedtime  carvedilol 25 milliGRAM(s) Oral every 12 hours  chlorhexidine 2% Cloths 1 Application(s) Topical daily  clopidogrel Tablet 75 milliGRAM(s) Oral daily  enoxaparin Injectable 40 milliGRAM(s) SubCutaneous every 24 hours  finasteride 5 milliGRAM(s) Oral daily  furosemide    Tablet 40 milliGRAM(s) Oral daily  insulin glargine Injectable (LANTUS) 25 Unit(s) SubCutaneous at bedtime  insulin lispro (ADMELOG) corrective regimen sliding scale   SubCutaneous Before meals and at bedtime  insulin lispro Injectable (ADMELOG) 6 Unit(s) SubCutaneous three times a day before meals  levothyroxine 50 MICROGram(s) Oral daily  losartan 100 milliGRAM(s) Oral daily  melatonin 5 milliGRAM(s) Oral <User Schedule>  OLANZapine 2.5 milliGRAM(s) Oral <User Schedule>  polyethylene glycol 3350 17 Gram(s) Oral every 24 hours  senna 2 Tablet(s) Oral at bedtime    MEDICATIONS  (PRN):  acetaminophen     Tablet .. 975 milliGRAM(s) Oral every 6 hours PRN Mild Pain (1 - 3), Moderate Pain (4 - 6), Severe Pain (7 - 10)      Vital Signs Last 24 Hrs  T(C): 36.6 (27 May 2025 08:53), Max: 37.1 (26 May 2025 18:05)  T(F): 97.9 (27 May 2025 08:53), Max: 98.7 (26 May 2025 18:05)  HR: 72 (27 May 2025 08:53) (64 - 79)  BP: 132/78 (27 May 2025 08:53) (129/66 - 176/79)  BP(mean): --  RR: 18 (27 May 2025 08:53) (18 - 18)  SpO2: 95% (27 May 2025 08:53) (95% - 96%)    Parameters below as of 27 May 2025 08:53  Patient On (Oxygen Delivery Method): room air          05-26 - 05-27  --------------------------------------------------------  IN:  Total IN: 0 mL    OUT:    Bulb (mL): 75 mL    Voided (mL): 2100 mL  Total OUT: 2175 mL    Total NET: -2175 mL          PHYSICAL EXAM   General: NAD  Respiratory: Respirations non-labored; no accessory muscle use  ABD: Soft, non-tender, non-distended. JOSHUA draining yellow tinged fluid. Midline incision intact.   : Stout catheter draining clear, yellow urine. SP tube capped.   Skin: Warm, dry. No cyanosis     LABS:                        9.7    7.52  )-----------( 246      ( 27 May 2025 06:39 )             31.1     05-27    142  |  107  |  13.4  ----------------------------<  200[H]  3.6   |  26.0  |  0.69    Ca    7.6[L]      27 May 2025 06:39        Urinalysis Basic - ( 27 May 2025 06:39 )    Color: x / Appearance: x / SG: x / pH: x  Gluc: 200 mg/dL / Ketone: x  / Bili: x / Urobili: x   Blood: x / Protein: x / Nitrite: x   Leuk Esterase: x / RBC: x / WBC x   Sq Epi: x / Non Sq Epi: x / Bacteria: x      ASSESSMENT: 74y Male with PMH CVA, CAD s/p PCI, HTN, HLD, DM, wheelchair-bound due to h/o stroke, h/o DVT and carotic stents, s/p cysto and clot evacuation on 5/16 followed by transfer from [facility] to [facility] with bladder perforation, b/l pneumothorax, pneumomediastinum, NSTEMI, fluid overload POD#10 s/p ex lap, anterior bladder wall repair, and SPT insertion. Now extubated, off pressors, downgraded to a regular medical floor.    PLAN:  Maintain stout catheter   Maintain SPT, please keep capped  F/u JOSHUA cr  D/c with stout, drain, and SPT  F/u with Dr. Bey outpt  Monitor labs and vitals  Bowel regimen  DVT ppx: Lovenox   Continue care as per primary team

## 2025-05-27 NOTE — DISCHARGE NOTE NURSING/CASE MANAGEMENT/SOCIAL WORK - FINANCIAL ASSISTANCE
[facility] provides services at a reduced cost to those who are determined to be eligible through [facility]’s financial assistance program. Information regarding [facility]’s financial assistance program can be found by going to [email or web address] or by calling [phone].

## 2025-05-27 NOTE — PROGRESS NOTE ADULT - PROVIDER SPECIALTY LIST ADULT
Cardiology
Internal Medicine
SICU
SICU
Urology
Internal Medicine
SICU
SICU
Urology
Internal Medicine
Internal Medicine
SICU
Cardiology
Hospitalist
SICU
SICU
Urology
Cardiology

## 2025-05-27 NOTE — DISCHARGE NOTE PROVIDER - CARE PROVIDER_API CALL
Paul Buenrostro  Cardiovascular Disease  [address], [address]  [place] [address]  Phone: [phone]  Fax: [phone]  Follow Up Time: 2 weeks    Wild Bey  Urology  [address] [place] [id number]  Phone: [phone]  Fax: [phone]  Follow Up Time: 2 weeks

## 2025-05-27 NOTE — DISCHARGE NOTE PROVIDER - PROVIDER TOKENS
PROVIDER:[TOKEN:[[id number]],FOLLOWUP:[2 weeks]],PROVIDER:[TOKEN:[[id number]],FOLLOWUP:[2 weeks]]

## 2025-05-27 NOTE — DISCHARGE NOTE PROVIDER - NSDCCPCAREPLAN_GEN_ALL_CORE_FT
PRINCIPAL DISCHARGE DIAGNOSIS  Diagnosis: Septic shock  Assessment and Plan of Treatment: You were initially at [facility] for a planned cystoscopy (bladder examination) and CBI (continuous bladder irrigation). During this procedure, there was an unexpected complication - a perforation (tear) in your bladder. This led to a serious infection causing septic shock. You were transferred to [facility] where surgeons repaired your bladder. Your infection has now resolved and you're ready to continue your recovery at home.  Rest is important for healing. Gradually increase your activity over the next few weeks.  You may feel tired or weak for several weeks - this is normal after a serious infection.  Avoid heavy lifting (nothing heavier than a gallon of milk) for at least 4 weeks.  Avoid strenuous exercise until cleared by your urologist.        SECONDARY DISCHARGE DIAGNOSES  Diagnosis: Bladder perforation, intraoperative  Assessment and Plan of Treatment: A Morris catheter is a soft tube that was placed through your urethra into your bladder to drain urine. The tube connects to a collection bag. Keep the collection bag below the level of your bladder at all times to prevent urine from flowing back into your bladder. Avoid pulling or tugging on the catheter. Record your urine output if instructed by your doctor. Clean the area where the catheter enters your body twice daily:  - Use mild soap and water or as instructed by your healthcare provider  - Clean in a circular motion moving away from where the tube enters your body  - Rinse and pat dry gently  A suprapubic catheter is a tube inserted through a small incision in your lower abdomen directly into your bladder to drain urine. Same instructions as the Morris catheter (keep below bladder level, empty when half full).  A JOSHUA drain is a soft plastic bulb connected to a tube inserted near your surgical site to remove excess fluid that might otherwise collect under your skin.  Emptying the Drain (typically 2-3 times daily)  - Wash your hands thoroughly  - Unplug the stopper on the bulb  - Empty fluid into a measuring cup  - Record the amount and color of drainage  - Squeeze the bulb flat while reinserting the stopper to create suction  - The bulb should remain collapsed to work properly  Follow up with your urologist within 2 weeks of being discharged.    Diagnosis: NSTEMI (non-ST elevation myocardial infarction)  Assessment and Plan of Treatment: You were diagnosed with a type of heart attack called NSTEMI (Non-ST Elevation Myocardial Infarction). This means that a part of your heart muscle received reduced blood flow due to a partial blockage in one of your heart arteries. During your stay, we performed a BIJAL (transesophageal echocardiogram) that showed your heart's pumping function (ejection fraction) is at 45-50%, which is mildly reduced. Your heart condition is currently being managed with medications rather than a heart catheterization procedure.  Please take all medications exactly as prescribed:  - Take Carvedilol 25mg twice a day: This helps reduce workload on your heart and control blood pressure  - Take Losartan 100mg once a day: This supports your heart function and improves symptoms of heart failure  - Take Atorvastatin 20mg once a day at bedtime: This lowers cholesterol and reduces risk of future heart problems  Take your medications at the same time each day. Do not stop any medication without speaking to your doctor first.  CALL 911 IMMEDIATELY if you experience:  - Chest discomfort (pressure, squeezing, fullness or pain)  - Pain or discomfort in arms, back, neck, jaw or stomach  - Shortness of breath  - Cold sweat, nausea, or lightheadedness  FOLLOW UP with your cardiologist in 2 WEEKS for additional tests to evaluate your heart (ischemic workup).    Diagnosis: DM (diabetes mellitus)  Assessment and Plan of Treatment: Diabetes is a chronic condition caused by high blood sugar levels. Your blood sugar levels become high because your body does not have enough insulin. Insulin helps move sugar out of the blood so it can be used for energy. Increased sugar in your blood can cause problems in several organs of your body including but not limited to your blood vessels, your kidneys, your brain, and your eyes. The lack of insulin forces your body to use fat instead of sugar for energy. This can be dangerous if not controlled.  Seek Medical Attention If:  You have a seizure.  You begin to breathe fast, or are short of breath.  You become weak and confused.  You are more drowsy than usual.  You have fruity, sweet breath.  You have severe, new stomach pain and are vomiting.  Your blood sugar level is lower or higher than your healthcare provider says it should be.  You have ketones in your blood or urine.  You have a fever or chills.  You are more thirsty than usual.  You are urinating more often than usual.  You have questions or concerns about your condition or care.  Insulin and diabetes medicine decreases the amount of sugar in your blood.  The best way to prevent problems from Diabetes is to control your blood sugars.  Monitor your blood sugar levels closely. Administer Insulin as directed by your physician.   Speak with your doctor and/or a nutritionist about the best way to change your lifestyle and dietary habits to avoid any problems from Diabetes in the future.      Diagnosis: CAD (coronary artery disease)  Assessment and Plan of Treatment: You have been diagnosed with Coronary Artery Disease (CAD). This means that the blood vessels that supply your heart muscle with oxygen and nutrients have become narrowed or blocked by fatty deposits called plaque. This can reduce blood flow to your heart and lead to symptoms like chest pain (angina) or, in some cases, a heart attack.  Take all medications exactly as prescribed:  Heart medications: Help your heart work more efficiently  Cholesterol-lowering medications: Reduce plaque buildup in arteries  Blood thinners: Help prevent blood clots  Blood pressure medications: Reduce strain on your heart  - Choose foods low in saturated fat, trans fat, and cholesterol  - Limit salt (sodium) to less than 2,300 mg per day  - Eat plenty of fruits, vegetables, and whole grains  Avoid smoking and limit your alcohol intake, no more than 1-2 drinks per day if at all.       Diagnosis: HTN (hypertension)  Assessment and Plan of Treatment: Hypertension, commonly called high blood pressure, is when the force of blood pumping through your arteries is too strong. Hypertension forces your heart to work harder to pump blood. Your arteries may become narrow or stiff. Having untreated or uncontrolled hypertension for a long period of time can cause heart attack, stroke, kidney disease, and other problems. If started on a medication, take exactly as prescribed by your health care professional. Maintain a healthy lifestyle and follow up with your primary care physician.  SEEK IMMEDIATE MEDICAL CARE IF YOU HAVE ANY OF THE FOLLOWING SYMPTOMS: severe headache, confusion, chest pain, abdominal pain, vomiting, or shortness of breath.      Diagnosis: CVA (cerebrovascular accident)  Assessment and Plan of Treatment: Continue taking aspirin 81mg once a day and plavix 75mg once a day.  Follow up with your primary care doctor within 1 week of being discharged from the hospital.    Diagnosis: Gout  Assessment and Plan of Treatment: Gout is a type of arthritis that causes sudden, severe attacks of pain, swelling, redness, and tenderness in joints.  Try to limit your intake of red meat, seafood, and alcohol, which can cause gout flare ups. Stay well hydrated - try to drink 8-10 glasses of water daily  Continue taking allopurinol as prescribed. Do not stop taking your medications even when you feel well.    Diagnosis: Hypothyroidism  Assessment and Plan of Treatment: Continue taking levothyroxine (Synthroid) 50 mcg daily as prescribed.    Diagnosis: Pneumomediastinum  Assessment and Plan of Treatment: You experienced a condition called pneumomediastinum, which means air was present in the space around your heart and other organs in your chest. This happened after you were intubated (had a breathing tube) and then extubated (tube removed). A chest tube was placed at [facility] to help remove the air and was removed before you were transferred to [facility]. Your most recent chest X-ray shows that your condition is now stable.  Practice deep breathing exercises 4-5 times daily:  - Sit upright and place your hands on your stomach  - Breathe in slowly through your nose, feeling your stomach expand  - Hold for 1-2 seconds  - Exhale slowly through your mouth  Call your doctor or GO TO THE ER if you have shortness of breath, chest pain, fever, or difficulty swallowing.    Diagnosis: Agitation  Assessment and Plan of Treatment: During your hospital stay, you experienced periods of confusion and agitation, which is called delirium. This was likely caused by being in the hospital environment, changes in your routine, medications, or the stress of illness. This type of confusion often improves once you return to a familiar environment.  Continue taking Zyprexa 2.5mg once a day as prescribed. Take it at the same time each day, preferably in the evening.  This medication helps reduce agitation and confusion. Do not stop taking this medication without talking to your doctor.  A regular sleep schedule is extremely important for your recovery:  - Go to bed at the same time each night  - Wake up at the same time each morning  - Aim for 7-8 hours of sleep each night  - Keep your bedroom quiet, dark, and comfortable  FOLLOW UP WITH YOUR PRIMARY CARE DOCTOR IN 1 WEEK.    Diagnosis: Acute exacerbation of chronic heart failure  Assessment and Plan of Treatment: You were admitted to the hospital for an acute heart failure exacerbation. This means your heart's pumping ability decreased, causing fluid to back up in your lungs and body. With treatment, we have improved your heart function and reduced this extra fluid, allowing you to return home to continue your recovery.  Take all medications exactly as prescribed:  - Lasix 40mg once a day -- Water pill (diuretic): Helps to remove excess fluid from your body  - Carvedilol 25mg twice a day -- (Heart medication): Helps to improve your heart's pumping ability  - Losartan 100mg once a day -- (Blood pressure medication): Helps to reduce strain on your heart

## 2025-05-27 NOTE — DISCHARGE NOTE PROVIDER - HOSPITAL COURSE
74M with PMHX CVA c/b residual aphasia/R hemiparesis on Plavix, CAD s/p PCI, HTN, HLD, DM2 admitted to [facility] for hematuria and underwent cystoscopy with clot evacuation c/b septic shock found to have bladder perforation, BL Pneumothorax, and Pneumomediastinum. R Chest tube placed at [facility] and removed prior to transfer to [facility] for Urological evaluation. X-ehsan to [facility] SICU for emergent ex-lap and anterior baldder repair 5/17/25 complicated Septic Shock, NSTEMI, and Acute on Chronc HF Exacerbation.  Admitted to SICU for emergent ex-lap and anterior bladder repair on 5/17/25 with ACS and Urology. SICU course complicated by NSTEMI and acute on chronic heart failure exacerbation. TTE TDS due to overlying air. BIJAL with LVEF 45-50% and +RWMA. Cardio and Interventional Cardio reccs appreciated. Eventual ischemic workup but no plan for LHC at this time/hospitalization, outpatient cardiology f/u. GDMT restarted. Diuresed with Lasix/Diuril. Extuabted to RA. Completed Meropenem and off vasopressors. Urology managing JOSHUA/SPC/Morris. Medically stable for d/c home with home care.        # Delirium/ sundowning-- improving  delirium precautions -- d/w nursing to implement  Zyprexa 2.5 and melatonin in evening   QTc prolonged on Seroquel 12.5. d/matthew 5/24          #Septic Shock 2/2 Bladder Perforation -- resolved  -s/p emergent ex-lap and anterior bladder repair on 5/17/25  -Off vasopressors  -Meropenem completed 5/20   -Finasteride 5mg q24  -Maintain Morris, SPT, and JOSHUA Drain do not remove unless instructed by Urology  -Monitor UOP. JOSHUA drain output has been 100-->150-->175-->95. Urology- to check JOSHUA drain fluid Crt to assess if there is a bladder leak.   - Outpatient Urology f/u    # s/p Acute on Chronic HFrEF Exac c/b NSTEMI  #uncontrolled HTN  -TTE +TDS 2/2 PTX and Pneumomediastinum  -BIJAL +LVEF 45-50 with apical anterior segment RWMA  -EKG NSR 71bpm low voltage +prolonged QTc 530ms abnormal EKG  -ASA 81mg q24  -Metoprolol changed to Carvedilol 25mg BID per Cardio for PVCs  -Lisinopril 5mg q24 changed to losartan ---- increased dose 5/25/25  -Atorvastatin 20mg qHS, ASA< BB. Family asking about Plavix restart  -Outpatient ischemic w/u, no plan for LHC during this hospitalization  -Conservative management at this time    #gen UE edema 2/2 third spacing -- improving  - educated arm elevation and exercises    #s/p B/L PTX and Pneumomediastinum  -s/p intubation and extubation on RA  -O2 via NC post extubation titrate as able as necessary  -s/p Chest Tube placement at [facility] and removed PTA  -Avoid Positive Pressure if able. Is on nocturnal CPAP here. Monitor for compliance  -CXR stable    #s/p DAYDAY    #DM2 with hyperglycemia  -Insulin increased as needed    #Constipation  -Bowel Regimen  -Miralax 17g q24 + Senna 2 tab qHS  -offered enemas PRN.     #Gout  -Allopurinol 100mg q24    #Hypothyroidism  -Levothyroxine 50mcg q24   74M with PMHX CVA c/b residual aphasia/R hemiparesis on Plavix, CAD s/p PCI, HTN, HLD, DM2 admitted to [facility] for hematuria and underwent cystoscopy with clot evacuation c/b septic shock found to have bladder perforation, BL Pneumothorax, and Pneumomediastinum. R Chest tube placed at [facility] and removed prior to transfer to [facility] for Urological evaluation. X-ehsan to [facility] SICU for emergent ex-lap and anterior baldder repair 5/17/25 complicated Septic Shock, NSTEMI, and Acute on Chronc HF Exacerbation.  Admitted to SICU for emergent ex-lap and anterior bladder repair on 5/17/25 with ACS and Urology. SICU course complicated by NSTEMI and acute on chronic heart failure exacerbation. TTE TDS due to overlying air. BIJAL with LVEF 45-50% and +RWMA. Cardio and Interventional Cardio reccs appreciated. Eventual ischemic workup but no plan for LHC at this time/hospitalization, outpatient cardiology f/u. GDMT restarted. Diuresed with Lasix/Diuril. Extuabted to RA. Completed Meropenem and off vasopressors. Urology managing JOSHUA/SPC/Morris. Medically stable for d/c home with home care.        # Delirium/ sundowning-- improving  delirium precautions -- d/w nursing to implement  Zyprexa 2.5 and melatonin in evening   QTc prolonged on Seroquel 12.5. d/matthew 5/24          #Septic Shock 2/2 Bladder Perforation -- resolved  -s/p emergent ex-lap and anterior bladder repair on 5/17/25  -Off vasopressors  -Meropenem completed 5/20   -Finasteride 5mg q24  -Maintain Morris, SPT, and JOSHUA Drain do not remove unless instructed by Urology  -Monitor UOP. JOSHUA drain output has been 100-->150-->175-->95. Urology- to check JOSHUA drain fluid Crt to assess if there is a bladder leak.   - sample of JOSHUA drain fluid sent to lab under "Miscellaneous Test"  - Outpatient Urology f/u    # s/p Acute on Chronic HFrEF Exac c/b NSTEMI  #uncontrolled HTN  -TTE +TDS 2/2 PTX and Pneumomediastinum  -BIJAL +LVEF 45-50 with apical anterior segment RWMA  -EKG NSR 71bpm low voltage +prolonged QTc 530ms abnormal EKG  -ASA 81mg q24  -Metoprolol changed to Carvedilol 25mg BID per Cardio for PVCs  -Lisinopril 5mg q24 changed to losartan ---- increased dose 5/25/25  -Atorvastatin 20mg qHS, ASA< BB. Family asking about Plavix restart  -Outpatient ischemic w/u, no plan for LHC during this hospitalization  -Conservative management at this time    #gen UE edema 2/2 third spacing -- improving  - educated arm elevation and exercises    #s/p B/L PTX and Pneumomediastinum  -s/p intubation and extubation on RA  -O2 via NC post extubation titrate as able as necessary  -s/p Chest Tube placement at [facility] and removed PTA  -Avoid Positive Pressure if able. Is on nocturnal CPAP here. Monitor for compliance  -CXR stable    #s/p DAYDAY    #DM2 with hyperglycemia  -Insulin increased as needed    #Constipation  -Bowel Regimen  -Miralax 17g q24 + Senna 2 tab qHS  -offered enemas PRN.     #Gout  -Allopurinol 100mg q24    #Hypothyroidism  -Levothyroxine 50mcg q24   74M with PMHX CVA c/b residual aphasia/R hemiparesis on Plavix, CAD s/p PCI, HTN, HLD, DM2 admitted to [facility] for hematuria and underwent cystoscopy with clot evacuation c/b septic shock found to have bladder perforation, BL Pneumothorax, and Pneumomediastinum. R Chest tube placed at [facility] and removed prior to transfer to [facility] for Urological evaluation. X-ehsan to [facility] SICU for emergent ex-lap and anterior baldder repair 5/17/25 complicated Septic Shock, NSTEMI, and Acute on Chronc HF Exacerbation.  Admitted to SICU for emergent ex-lap and anterior bladder repair on 5/17/25 with ACS and Urology. SICU course complicated by NSTEMI and acute on chronic heart failure exacerbation. TTE TDS due to overlying air. BIJAL with LVEF 45-50% and +RWMA. Cardio and Interventional Cardio reccs appreciated. Eventual ischemic workup but no plan for LHC at this time/hospitalization, outpatient cardiology f/u. GDMT restarted. Diuresed with Lasix/Diuril. Extuabted to RA. Completed Meropenem and off vasopressors. Urology managing JOSHUA/SPC/Morris. Medically stable for d/c home with home care.      # Delirium/ sundowning  #Septic Shock 2/2 Bladder Perforation -- resolved  -s/p emergent ex-lap and anterior bladder repair on 5/17/25  -Meropenem completed 5/20   -Finasteride 5mg q24  -Maintain Morris, SPT, and JOSHUA Drain do not remove unless instructed by Urology  -Monitor UOP. JOSHUA drain output has been 100-->150-->175-->95. Urology- to check JOSHUA drain fluid Crt to assess if there is a bladder leak.   - sample of JOSHUA drain fluid sent to lab under "Miscellaneous Test"  - Outpatient Urology f/u    # s/p Acute on Chronic HFrEF Exac c/b NSTEMI  #uncontrolled HTN  -TTE +TDS 2/2 PTX and Pneumomediastinum  -BIJAL +LVEF 45-50 with apical anterior segment RWMA  -EKG NSR 71bpm low voltage +prolonged QTc 530ms abnormal EKG  -ASA 81mg q24 and Plavix  -Carvedilol 25mg BID per Cardio for PVCs  -Losartan  -Atorvastatin 20mg qHS,   -Outpatient ischemic w/u, no plan for LHC during this hospitalization    #Gen UE edema 2/2 third spacing   - educated arm elevation and exercises    #s/p B/L PTX and Pneumomediastinum  -s/p intuabtion and s/p chest tube. now on RA    #s/p DAYDAY    #DM2 with hyperglycemia  -Insulin increased as needed    #Constipation  -Bowel Regimen  -Miralax 17g q24 + Senna 2 tab qHS  -offered enemas PRN.     #Gout  -Allopurinol 100mg q24    #Hypothyroidism  -Levothyroxine 50mcg q24

## 2025-06-02 PROCEDURE — C1769: CPT

## 2025-06-02 PROCEDURE — 36415 COLL VENOUS BLD VENIPUNCTURE: CPT

## 2025-06-02 PROCEDURE — 83036 HEMOGLOBIN GLYCOSYLATED A1C: CPT

## 2025-06-02 PROCEDURE — 82550 ASSAY OF CK (CPK): CPT

## 2025-06-02 PROCEDURE — 84484 ASSAY OF TROPONIN QUANT: CPT

## 2025-06-02 PROCEDURE — 93320 DOPPLER ECHO COMPLETE: CPT

## 2025-06-02 PROCEDURE — 82040 ASSAY OF SERUM ALBUMIN: CPT

## 2025-06-02 PROCEDURE — 86923 COMPATIBILITY TEST ELECTRIC: CPT

## 2025-06-02 PROCEDURE — 80048 BASIC METABOLIC PNL TOTAL CA: CPT

## 2025-06-02 PROCEDURE — 83735 ASSAY OF MAGNESIUM: CPT

## 2025-06-02 PROCEDURE — 85027 COMPLETE CBC AUTOMATED: CPT

## 2025-06-02 PROCEDURE — 85014 HEMATOCRIT: CPT

## 2025-06-02 PROCEDURE — C2627: CPT

## 2025-06-02 PROCEDURE — 87641 MR-STAPH DNA AMP PROBE: CPT

## 2025-06-02 PROCEDURE — 86900 BLOOD TYPING SEROLOGIC ABO: CPT

## 2025-06-02 PROCEDURE — 84132 ASSAY OF SERUM POTASSIUM: CPT

## 2025-06-02 PROCEDURE — 80076 HEPATIC FUNCTION PANEL: CPT

## 2025-06-02 PROCEDURE — 97167 OT EVAL HIGH COMPLEX 60 MIN: CPT

## 2025-06-02 PROCEDURE — 84443 ASSAY THYROID STIM HORMONE: CPT

## 2025-06-02 PROCEDURE — 71045 X-RAY EXAM CHEST 1 VIEW: CPT

## 2025-06-02 PROCEDURE — 86901 BLOOD TYPING SEROLOGIC RH(D): CPT

## 2025-06-02 PROCEDURE — 36430 TRANSFUSION BLD/BLD COMPNT: CPT

## 2025-06-02 PROCEDURE — C9399: CPT

## 2025-06-02 PROCEDURE — 85384 FIBRINOGEN ACTIVITY: CPT

## 2025-06-02 PROCEDURE — 93306 TTE W/DOPPLER COMPLETE: CPT

## 2025-06-02 PROCEDURE — 82330 ASSAY OF CALCIUM: CPT

## 2025-06-02 PROCEDURE — 85025 COMPLETE CBC W/AUTO DIFF WBC: CPT

## 2025-06-02 PROCEDURE — 82009 KETONE BODYS QUAL: CPT

## 2025-06-02 PROCEDURE — 84295 ASSAY OF SERUM SODIUM: CPT

## 2025-06-02 PROCEDURE — 87640 STAPH A DNA AMP PROBE: CPT

## 2025-06-02 PROCEDURE — 82803 BLOOD GASES ANY COMBINATION: CPT

## 2025-06-02 PROCEDURE — 97163 PT EVAL HIGH COMPLEX 45 MIN: CPT

## 2025-06-02 PROCEDURE — 85018 HEMOGLOBIN: CPT

## 2025-06-02 PROCEDURE — 93005 ELECTROCARDIOGRAM TRACING: CPT

## 2025-06-02 PROCEDURE — 93312 ECHO TRANSESOPHAGEAL: CPT

## 2025-06-02 PROCEDURE — P9016: CPT

## 2025-06-02 PROCEDURE — 85730 THROMBOPLASTIN TIME PARTIAL: CPT

## 2025-06-02 PROCEDURE — 84100 ASSAY OF PHOSPHORUS: CPT

## 2025-06-02 PROCEDURE — 36600 WITHDRAWAL OF ARTERIAL BLOOD: CPT

## 2025-06-02 PROCEDURE — 94003 VENT MGMT INPAT SUBQ DAY: CPT

## 2025-06-02 PROCEDURE — 82435 ASSAY OF BLOOD CHLORIDE: CPT

## 2025-06-02 PROCEDURE — 93325 DOPPLER ECHO COLOR FLOW MAPG: CPT

## 2025-06-02 PROCEDURE — 80053 COMPREHEN METABOLIC PANEL: CPT

## 2025-06-02 PROCEDURE — 83605 ASSAY OF LACTIC ACID: CPT

## 2025-06-02 PROCEDURE — 82570 ASSAY OF URINE CREATININE: CPT

## 2025-06-02 PROCEDURE — 86850 RBC ANTIBODY SCREEN: CPT

## 2025-06-02 PROCEDURE — 94002 VENT MGMT INPAT INIT DAY: CPT

## 2025-06-02 PROCEDURE — 85610 PROTHROMBIN TIME: CPT

## 2025-06-02 PROCEDURE — 82962 GLUCOSE BLOOD TEST: CPT

## 2025-06-02 PROCEDURE — 93308 TTE F-UP OR LMTD: CPT

## 2025-06-02 PROCEDURE — 82947 ASSAY GLUCOSE BLOOD QUANT: CPT

## 2025-06-02 PROCEDURE — 82010 KETONE BODYS QUAN: CPT

## 2025-06-02 PROCEDURE — 94660 CPAP INITIATION&MGMT: CPT

## 2025-06-02 PROCEDURE — 81001 URINALYSIS AUTO W/SCOPE: CPT

## 2025-06-04 NOTE — H&P ADULT - HISTORY OF PRESENT ILLNESS
70 yo male with PMHx of CVA, cad s/p PCI, HTN, HLD, DM, wheelchair bound due to hx of stroke transferred from [place] for clot evacuation. Patient had a failed cysto and clot evacuation at [facility] due to a false passage and was transferred here emergently.  Saw pt when her PCP was on maternity leave, pt said she wanted to keep her current PCP.    It appears she reached out to PCP's nurse team for this medication as well. Will defer PCP's team.    Lara Robertson,   Family Medicine  [facility]

## 2025-06-17 ENCOUNTER — APPOINTMENT (OUTPATIENT)
Dept: UROLOGY | Facility: CLINIC | Age: 75
End: 2025-06-17

## 2025-06-17 PROCEDURE — 99024 POSTOP FOLLOW-UP VISIT: CPT

## 2025-06-26 ENCOUNTER — APPOINTMENT (OUTPATIENT)
Dept: UROLOGY | Facility: CLINIC | Age: 75
End: 2025-06-26

## 2025-06-26 ENCOUNTER — RESULT CHARGE (OUTPATIENT)
Age: 75
End: 2025-06-26

## 2025-06-26 PROCEDURE — 51700 IRRIGATION OF BLADDER: CPT | Mod: 58

## 2025-06-26 PROCEDURE — 51798 US URINE CAPACITY MEASURE: CPT

## 2025-06-26 PROCEDURE — A4216: CPT | Mod: NC

## 2025-06-27 ENCOUNTER — APPOINTMENT (OUTPATIENT)
Dept: UROLOGY | Facility: CLINIC | Age: 75
End: 2025-06-27

## 2025-06-27 PROCEDURE — 51798 US URINE CAPACITY MEASURE: CPT

## 2025-07-03 ENCOUNTER — APPOINTMENT (OUTPATIENT)
Dept: UROLOGY | Facility: CLINIC | Age: 75
End: 2025-07-03

## 2025-07-03 PROBLEM — N39.0 URINARY TRACT INFECTION WITHOUT HEMATURIA, SITE UNSPECIFIED: Status: ACTIVE | Noted: 2025-07-03 | Resolved: 2025-08-02

## 2025-07-03 LAB — BACTERIA UR CULT: ABNORMAL

## 2025-07-03 PROCEDURE — 99213 OFFICE O/P EST LOW 20 MIN: CPT | Mod: 24

## 2025-07-03 PROCEDURE — 51798 US URINE CAPACITY MEASURE: CPT

## 2025-07-06 PROBLEM — N40.1 BPH WITH OBSTRUCTION/LOWER URINARY TRACT SYMPTOMS: Status: ACTIVE | Noted: 2025-06-26

## 2025-07-06 PROBLEM — N32.89: Status: ACTIVE | Noted: 2025-06-17

## 2025-07-10 RX ORDER — CIPROFLOXACIN HYDROCHLORIDE 250 MG/1
250 TABLET, FILM COATED ORAL
Qty: 10 | Refills: 0 | Status: ACTIVE | COMMUNITY
Start: 2025-07-03 | End: 1900-01-01

## 2025-07-11 ENCOUNTER — LABORATORY RESULT (OUTPATIENT)
Age: 75
End: 2025-07-11

## 2025-07-12 LAB
APPEARANCE: ABNORMAL
BILIRUBIN URINE: NEGATIVE
BLOOD URINE: ABNORMAL
COLOR: YELLOW
GLUCOSE QUALITATIVE U: >=1000 MG/DL
KETONES URINE: NEGATIVE MG/DL
LEUKOCYTE ESTERASE URINE: ABNORMAL
NITRITE URINE: NEGATIVE
PH URINE: 6.5
PROTEIN URINE: NORMAL MG/DL
SPECIFIC GRAVITY URINE: 1.02
UROBILINOGEN URINE: 0.2 MG/DL

## 2025-07-13 LAB — BACTERIA UR CULT: NORMAL

## 2025-07-15 ENCOUNTER — NON-APPOINTMENT (OUTPATIENT)
Age: 75
End: 2025-07-15

## 2025-08-04 LAB — BACTERIA UR CULT: NORMAL

## 2025-08-12 ENCOUNTER — RESULT REVIEW (OUTPATIENT)
Age: 75
End: 2025-08-12

## 2025-08-19 ENCOUNTER — APPOINTMENT (OUTPATIENT)
Dept: UROLOGY | Facility: CLINIC | Age: 75
End: 2025-08-19

## 2025-08-19 VITALS
WEIGHT: 252 LBS | HEART RATE: 65 BPM | SYSTOLIC BLOOD PRESSURE: 149 MMHG | DIASTOLIC BLOOD PRESSURE: 91 MMHG | BODY MASS INDEX: 29.16 KG/M2 | HEIGHT: 78 IN

## 2025-08-19 PROCEDURE — 51798 US URINE CAPACITY MEASURE: CPT

## 2025-08-19 PROCEDURE — 99213 OFFICE O/P EST LOW 20 MIN: CPT

## 2025-08-20 ENCOUNTER — LABORATORY RESULT (OUTPATIENT)
Age: 75
End: 2025-08-20

## 2025-08-21 LAB
APPEARANCE: CLEAR
APPEARANCE: CLEAR
BILIRUBIN URINE: NEGATIVE
BILIRUBIN URINE: NEGATIVE
BLOOD URINE: ABNORMAL
BLOOD URINE: ABNORMAL
COLOR: YELLOW
COLOR: YELLOW
GLUCOSE QUALITATIVE U: NEGATIVE MG/DL
GLUCOSE QUALITATIVE U: NEGATIVE MG/DL
KETONES URINE: NEGATIVE MG/DL
KETONES URINE: NEGATIVE MG/DL
LEUKOCYTE ESTERASE URINE: ABNORMAL
LEUKOCYTE ESTERASE URINE: ABNORMAL
NITRITE URINE: NEGATIVE
NITRITE URINE: NEGATIVE
PH URINE: 6.5
PH URINE: 6.5
PROTEIN URINE: NORMAL MG/DL
PROTEIN URINE: NORMAL MG/DL
SPECIFIC GRAVITY URINE: 1.01
SPECIFIC GRAVITY URINE: 1.01
UROBILINOGEN URINE: 0.2 MG/DL
UROBILINOGEN URINE: 0.2 MG/DL

## (undated) DEVICE — Device

## (undated) DEVICE — SUT VICRYL 2-0 27" CT-1 UNDYED

## (undated) DEVICE — LIGASURE IMPACT

## (undated) DEVICE — PACK CYSTOSCOPY TIBURON

## (undated) DEVICE — VENODYNE/SCD SLEEVE CALF MEDIUM

## (undated) DEVICE — SOL IRR BAG NS 0.9% 3000ML

## (undated) DEVICE — SOL IRR BAG H2O 3000ML

## (undated) DEVICE — NDL HYPO SAFE 18G X 1.5" (PINK)

## (undated) DEVICE — FOLEY CATH 3-WAY 24FR 30CC SOFT SIMPLASTIC

## (undated) DEVICE — SOL IRR POUR NS 0.9% 1000ML

## (undated) DEVICE — ELCTR BOVIE BLADE 3/4" EXTENDED LENGTH 6"

## (undated) DEVICE — VISITEC 4X4

## (undated) DEVICE — TUBING TUR 2 PRONG

## (undated) DEVICE — GLV 8 PROTEXIS (WHITE)

## (undated) DEVICE — ELCTR GROUNDING PAD ADULT COVIDIEN

## (undated) DEVICE — UROVAC

## (undated) DEVICE — FOLEY TRAY 16FR 5CC LF UMETER CLOSED

## (undated) DEVICE — DRSG PREVENA PLUS SYSTEM

## (undated) DEVICE — WARMING BLANKET UPPER ADULT

## (undated) DEVICE — STAPLER SKIN PROXIMATE

## (undated) DEVICE — SUT PDS II 1 48" TP-1

## (undated) DEVICE — POOLE SUCTION TIP

## (undated) DEVICE — DRSG TELFA 3 X 4

## (undated) DEVICE — TUBING IRR SET FOR CYSTOSCOPY 77"

## (undated) DEVICE — ELCTR ROCKER SWITCH PENCIL BLUE 10FT

## (undated) DEVICE — SYR TOOMEY 50ML

## (undated) DEVICE — DRAPE WARMING SOLUTION 66 X 44"

## (undated) DEVICE — PACK MAJOR ABDOMINAL WITH LAP

## (undated) DEVICE — SOL IRR POUR H2O 1000ML

## (undated) DEVICE — ELCTR PLASMA BUTTON OVAL 24FR 12-30 DEG

## (undated) DEVICE — RETAINER VICERA FISH LG

## (undated) DEVICE — ELCTR PLASMA LOOP LARGE 24FR 12-16 DEG

## (undated) DEVICE — BLADE SURGICAL #15 CARBON

## (undated) DEVICE — BLADE SURGICAL #10 STAINLESS

## (undated) DEVICE — DRAPE TOWEL BLUE 17" X 24"

## (undated) DEVICE — ELCTR PLASMA LOOP MEDIUM 24FR 12-30 DEG

## (undated) DEVICE — DRAPE TOWEL BLUE STICKY

## (undated) DEVICE — SUT MONOCRYL 4-0 27" PS-2 UNDYED

## (undated) DEVICE — GLV 7.5 PROTEXIS (WHITE)

## (undated) DEVICE — GOWN XL W TOWEL

## (undated) DEVICE — POSITIONER FOAM EGG CRATE ULNAR 2PCS (PINK)

## (undated) DEVICE — DRAPE 1/2 SHEET 40X57"